# Patient Record
Sex: MALE | Race: WHITE | Employment: OTHER | URBAN - METROPOLITAN AREA
[De-identification: names, ages, dates, MRNs, and addresses within clinical notes are randomized per-mention and may not be internally consistent; named-entity substitution may affect disease eponyms.]

---

## 2017-02-10 ENCOUNTER — HOSPITAL ENCOUNTER (OUTPATIENT)
Dept: LAB | Age: 77
Discharge: HOME | End: 2017-02-10
Payer: MEDICARE

## 2017-02-10 ENCOUNTER — OFFICE VISIT (OUTPATIENT)
Dept: INTERNAL MEDICINE CLINIC | Age: 77
End: 2017-02-10

## 2017-02-10 VITALS
TEMPERATURE: 97 F | WEIGHT: 234 LBS | BODY MASS INDEX: 33.5 KG/M2 | HEART RATE: 54 BPM | SYSTOLIC BLOOD PRESSURE: 130 MMHG | OXYGEN SATURATION: 97 % | HEIGHT: 70 IN | RESPIRATION RATE: 16 BRPM | DIASTOLIC BLOOD PRESSURE: 80 MMHG

## 2017-02-10 DIAGNOSIS — E78.2 MIXED HYPERLIPIDEMIA: ICD-10-CM

## 2017-02-10 DIAGNOSIS — I10 ESSENTIAL HYPERTENSION: ICD-10-CM

## 2017-02-10 DIAGNOSIS — I10 ESSENTIAL HYPERTENSION: Primary | ICD-10-CM

## 2017-02-10 DIAGNOSIS — I48.0 PAROXYSMAL ATRIAL FIBRILLATION (HCC): ICD-10-CM

## 2017-02-10 LAB
ALBUMIN SERPL BCP-MCNC: 3.4 G/DL (ref 3.4–5)
ALBUMIN/GLOB SERPL: 1.1 {RATIO} (ref 0.8–1.7)
ALP SERPL-CCNC: 103 U/L (ref 45–117)
ALT SERPL-CCNC: 17 U/L (ref 16–61)
ANION GAP BLD CALC-SCNC: 7 MMOL/L (ref 3–18)
AST SERPL W P-5'-P-CCNC: 13 U/L (ref 15–37)
BASOPHILS # BLD AUTO: 0 K/UL (ref 0–0.06)
BASOPHILS # BLD: 0 % (ref 0–2)
BILIRUB SERPL-MCNC: 1 MG/DL (ref 0.2–1)
BUN SERPL-MCNC: 19 MG/DL (ref 7–18)
BUN/CREAT SERPL: 13 (ref 12–20)
CALCIUM SERPL-MCNC: 8.8 MG/DL (ref 8.5–10.1)
CHLORIDE SERPL-SCNC: 106 MMOL/L (ref 100–108)
CHOLEST SERPL-MCNC: 134 MG/DL
CO2 SERPL-SCNC: 29 MMOL/L (ref 21–32)
CREAT SERPL-MCNC: 1.41 MG/DL (ref 0.6–1.3)
DIFFERENTIAL METHOD BLD: ABNORMAL
EOSINOPHIL # BLD: 0.2 K/UL (ref 0–0.4)
EOSINOPHIL NFR BLD: 3 % (ref 0–5)
ERYTHROCYTE [DISTWIDTH] IN BLOOD BY AUTOMATED COUNT: 14.4 % (ref 11.6–14.5)
GLOBULIN SER CALC-MCNC: 3.2 G/DL (ref 2–4)
GLUCOSE SERPL-MCNC: 106 MG/DL (ref 74–99)
HCT VFR BLD AUTO: 43.3 % (ref 36–48)
HDLC SERPL-MCNC: 43 MG/DL (ref 40–60)
HDLC SERPL: 3.1 {RATIO} (ref 0–5)
HGB BLD-MCNC: 14.5 G/DL (ref 13–16)
LDLC SERPL CALC-MCNC: 71.2 MG/DL (ref 0–100)
LIPID PROFILE,FLP: NORMAL
LYMPHOCYTES # BLD AUTO: 16 % (ref 21–52)
LYMPHOCYTES # BLD: 1.4 K/UL (ref 0.9–3.6)
MCH RBC QN AUTO: 29.6 PG (ref 24–34)
MCHC RBC AUTO-ENTMCNC: 33.5 G/DL (ref 31–37)
MCV RBC AUTO: 88.4 FL (ref 74–97)
MONOCYTES # BLD: 0.6 K/UL (ref 0.05–1.2)
MONOCYTES NFR BLD AUTO: 7 % (ref 3–10)
NEUTS SEG # BLD: 6.5 K/UL (ref 1.8–8)
NEUTS SEG NFR BLD AUTO: 74 % (ref 40–73)
PLATELET # BLD AUTO: 172 K/UL (ref 135–420)
PMV BLD AUTO: 11.2 FL (ref 9.2–11.8)
POTASSIUM SERPL-SCNC: 3.7 MMOL/L (ref 3.5–5.5)
PROT SERPL-MCNC: 6.6 G/DL (ref 6.4–8.2)
RBC # BLD AUTO: 4.9 M/UL (ref 4.7–5.5)
SODIUM SERPL-SCNC: 142 MMOL/L (ref 136–145)
TRIGL SERPL-MCNC: 99 MG/DL (ref ?–150)
VLDLC SERPL CALC-MCNC: 19.8 MG/DL
WBC # BLD AUTO: 8.7 K/UL (ref 4.6–13.2)

## 2017-02-10 PROCEDURE — 85025 COMPLETE CBC W/AUTO DIFF WBC: CPT | Performed by: INTERNAL MEDICINE

## 2017-02-10 PROCEDURE — 80061 LIPID PANEL: CPT | Performed by: INTERNAL MEDICINE

## 2017-02-10 PROCEDURE — 80053 COMPREHEN METABOLIC PANEL: CPT | Performed by: INTERNAL MEDICINE

## 2017-02-10 RX ORDER — NIFEDIPINE 60 MG/1
60 TABLET, EXTENDED RELEASE ORAL DAILY
Qty: 90 TAB | Refills: 3 | Status: SHIPPED | OUTPATIENT
Start: 2017-02-10 | End: 2017-02-20

## 2017-02-10 NOTE — PATIENT INSTRUCTIONS

## 2017-02-10 NOTE — MR AVS SNAPSHOT
Visit Information Date & Time Provider Department Dept. Phone Encounter #  
 2/10/2017  9:00 AM Radha Strong MD [facility] INTERNAL MEDICINE OF [place] [phone] [id number] Follow-up Instructions Return if symptoms worsen or fail to improve. Follow-up and Disposition History Your Appointments 10/6/2017  9:00 AM  
Follow Up with Radha Strong MD  
[facility] INTERNAL MEDICINE OF [place] ([facility]) Appt Note: 8 months [address], [address]  
  
   
 [address], [address] [facility] [address] Upcoming Health Maintenance Date Due DTaP/Tdap/Td series (1 - Tdap) 4/3/1961 ZOSTER VACCINE AGE 60> 4/3/2000 GLAUCOMA SCREENING Q2Y 4/3/2005 Pneumococcal 65+ High/Highest Risk (2 of 2 - PPSV23) 11/13/2015 MEDICARE YEARLY EXAM 8/12/2017 COLONOSCOPY 6/6/2022 Allergies as of 2/10/2017  Review Complete On: 2/10/2017 By: Radha Strong MD  
  
 Severity Noted Reaction Type Reactions Vicodin [Hydrocodone-acetaminophen]  11/20/2014    Swelling Current Immunizations  Reviewed on 8/11/2016 Name Date Influenza High Dose Vaccine PF 10/1/2016 Influenza Vaccine [place]) 9/18/2015 Pneumococcal Conjugate (PCV-13) 9/18/2015 Pneumococcal Polysaccharide (PPSV-23) 1/2/2005 Not reviewed this visit You Were Diagnosed With   
  
 Codes Comments Essential hypertension    -  Primary ICD-10-CM: I10 
ICD-9-CM: 401.9 Mixed hyperlipidemia     ICD-10-CM: E78.2 ICD-9-CM: 272.2 Paroxysmal atrial fibrillation (HCC)     ICD-10-CM: I48.0 ICD-9-CM: 427.31 Vitals BP Pulse Temp Resp Height(growth percentile) Weight(growth percentile) 130/80 (BP 1 Location: Left arm, BP Patient Position: Sitting) (!) 54 97 °F (36.1 °C) (Tympanic) 16 5' 10\" (1.778 m) 234 lb (106.1 kg) SpO2 BMI Smoking Status 97% 33.58 kg/m2 Never Smoker Vitals History BMI and BSA Data Body Mass Index Body Surface Area 33.58 kg/m 2 2.29 m 2 Preferred Pharmacy Pharmacy Name Phone [address], [address] [place] [address] 22 [phone] [facility] [phone] Your Updated Medication List  
  
   
This list is accurate as of: 2/10/17  9:31 AM.  Always use your most recent med list.  
  
  
  
  
 allopurinol 100 mg tablet Commonly known as:  [place] Skip Take 1 Tab by mouth daily. atorvastatin 40 mg tablet Commonly known as:  LIPITOR  
1/2 qd  
  
 losartan-hydroCHLOROthiazide 100-12.5 mg per tablet Commonly known as:  HYZAAR Take 1 Tab by mouth daily. NIFEdipine ER 60 mg ER tablet Commonly known as:  PROCARDIA XL Take 1 Tab by mouth daily. rivaroxaban 20 mg Tab tablet Commonly known as:  [place] Honor Take 1 Tab by mouth daily. TENORMIN 50 mg tablet Generic drug:  atenolol Take  by mouth daily. Prescriptions Sent to Pharmacy Refills NIFEdipine ER (PROCARDIA XL) 60 mg ER tablet 3 Sig: Take 1 Tab by mouth daily. Class: Normal  
 Pharmacy: [address] [place] [address] #: [phone] Route: Oral  
  
Follow-up Instructions Return if symptoms worsen or fail to improve. To-Do List   
 02/10/2017 Lab:  CBC WITH AUTOMATED DIFF   
  
 02/10/2017 Lab:  LIPID PANEL   
  
 02/10/2017 Lab:  METABOLIC PANEL, COMPREHENSIVE Patient Instructions DASH Diet: Care Instructions Your Care Instructions The DASH diet is an eating plan that can help lower your blood pressure. DASH stands for Dietary Approaches to Stop Hypertension. Hypertension is high blood pressure. The DASH diet focuses on eating foods that are high in calcium, potassium, and magnesium. These nutrients can lower blood pressure.  The foods that are highest in these nutrients are fruits, vegetables, low-fat dairy products, nuts, seeds, and legumes. But taking calcium, potassium, and magnesium supplements instead of eating foods that are high in those nutrients does not have the same effect. The DASH diet also includes whole grains, fish, and poultry. The DASH diet is one of several lifestyle changes your doctor may recommend to lower your high blood pressure. Your doctor may also want you to decrease the amount of sodium in your diet. Lowering sodium while following the DASH diet can lower blood pressure even further than just the DASH diet alone. Follow-up care is a key part of your treatment and safety. Be sure to make and go to all appointments, and call your doctor if you are having problems. It's also a good idea to know your test results and keep a list of the medicines you take. How can you care for yourself at home? Following the DASH diet · Eat 4 to 5 servings of fruit each day. A serving is 1 medium-sized piece of fruit, ½ cup chopped or canned fruit, 1/4 cup dried fruit, or 4 ounces (½ cup) of fruit juice. Choose fruit more often than fruit juice. · Eat 4 to 5 servings of vegetables each day. A serving is 1 cup of lettuce or raw leafy vegetables, ½ cup of chopped or cooked vegetables, or 4 ounces (½ cup) of vegetable juice. Choose vegetables more often than vegetable juice. · Get 2 to 3 servings of low-fat and fat-free dairy each day. A serving is 8 ounces of milk, 1 cup of yogurt, or 1 ½ ounces of cheese. · Eat 6 to 8 servings of grains each day. A serving is 1 slice of bread, 1 ounce of dry cereal, or ½ cup of cooked rice, pasta, or cooked cereal. Try to choose whole-grain products as much as possible. · Limit lean meat, poultry, and fish to 2 servings each day. A serving is 3 ounces, about the size of a deck of cards. · Eat 4 to 5 servings of nuts, seeds, and legumes (cooked dried beans, lentils, and split peas) each week.  A serving is 1/3 cup of nuts, 2 tablespoons of seeds, or ½ cup of cooked beans or peas. · Limit fats and oils to 2 to 3 servings each day. A serving is 1 teaspoon of vegetable oil or 2 tablespoons of salad dressing. · Limit sweets and added sugars to 5 servings or less a week. A serving is 1 tablespoon jelly or jam, ½ cup sorbet, or 1 cup of lemonade. · Eat less than 2,300 milligrams (mg) of sodium a day. If you limit your sodium to 1,500 mg a day, you can lower your blood pressure even more. Tips for success · Start small. Do not try to make dramatic changes to your diet all at once. You might feel that you are missing out on your favorite foods and then be more likely to not follow the plan. Make small changes, and stick with them. Once those changes become habit, add a few more changes. · Try some of the following: ¨ Make it a goal to eat a fruit or vegetable at every meal and at snacks. This will make it easy to get the recommended amount of fruits and vegetables each day. ¨ Try yogurt topped with fruit and nuts for a snack or healthy dessert. ¨ Add lettuce, tomato, cucumber, and onion to sandwiches. ¨ Combine a ready-made pizza crust with low-fat mozzarella cheese and lots of vegetable toppings. Try using tomatoes, squash, spinach, broccoli, carrots, cauliflower, and onions. ¨ Have a variety of cut-up vegetables with a low-fat dip as an appetizer instead of chips and dip. ¨ Sprinkle sunflower seeds or chopped almonds over salads. Or try adding chopped walnuts or almonds to cooked vegetables. ¨ Try some vegetarian meals using beans and peas. Add garbanzo or kidney beans to salads. Make burritos and tacos with mashed yancey beans or black beans. Where can you learn more? Go to http://ana paula-carol.info/. Enter J225 in the search box to learn more about \"DASH Diet: Care Instructions. \" Current as of: March 23, 2016 Content Version: 11.1 © 1549-9392 [employer].  Care instructions adapted under license by [address] (which disclaims liability or warranty for this information). If you have questions about a medical condition or this instruction, always ask your healthcare professional. [address] any warranty or liability for your use of this information. Introducing [facility]! Brian Alonso introduces [employer] patient portal. Now you can access parts of your medical record, email your doctor's office, and request medication refills online. 1. In your internet browser, go to [email or web address] 2. Click on the First Time User? Click Here link in the Sign In box. You will see the New Member Sign Up page. 3. Enter your [employer] Access Code exactly as it appears below. You will not need to use this code after youve completed the sign-up process. If you do not sign up before the expiration date, you must request a new code. · [employer] Access Code: [email or web address] Expires: 5/11/2017  9:31 AM 
 
4. Enter the last four digits of your Social Security Number (xxxx) and Date of Birth (mm/dd/yyyy) as indicated and click Submit. You will be taken to the next sign-up page. 5. Create a [employer] ID. This will be your [employer] login ID and cannot be changed, so think of one that is secure and easy to remember. 6. Create a [employer] password. You can change your password at any time. 7. Enter your Password Reset Question and Answer. This can be used at a later time if you forget your password. 8. Enter your e-mail address. You will receive e-mail notification when new information is available in [address] 9. Click Sign Up. You can now view and download portions of your medical record. 10. Click the Download Summary menu link to download a portable copy of your medical information. If you have questions, please visit the Frequently Asked Questions section of the [employer] website.  Remember, [employer] is NOT to be used for urgent needs. For medical emergencies, dial 911. Now available from your iPhone and Android! Please provide this summary of care documentation to your next provider. Your primary care clinician is listed as Ashly Sigala. If you have any questions after today's visit, please call [phone].

## 2017-02-10 NOTE — PROGRESS NOTES
1. Have you been to the ER, urgent care clinic since your last visit? Hospitalized since your last visit? No    2. Have you seen or consulted any other health care providers outside of the [address] [place] [address] since your last visit? Include any pap smears or colon screening.  No

## 2017-02-10 NOTE — PROGRESS NOTES
HPI:   Routine f/u  Hx notable for HTN, hyperlipidemia, PAF  Chol 137 in August 2016  w/o chest pain/abd. discomfort; no dyspnea, cough or increased pedal edema; denies constitutional complaints of fever, night sweats or wt loss; no evidence of GI/ hemorrhage; no polyuria/polydipsia. Activity is age appropriate despite OA pain    ROS is otherwise negative. 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 Social History Main Topics    Smoking status: Never Smoker    Smokeless tobacco: Not on file    Alcohol use 0.0 oz/week     0 Standard drinks or equivalent per week    Drug use: No    Sexual activity: No     Other Topics Concern    Not on file     Social History Narrative       Allergies   Allergen Reactions    Vicodin [Hydrocodone-Acetaminophen] Swelling       Family History   Problem Relation Age of Onset    Heart Disease Father     Cancer Sister      breast       Current Outpatient Prescriptions   Medication Sig Dispense Refill    atorvastatin (LIPITOR) 40 mg tablet 1/2 qd 45 Tab 3    rivaroxaban (XARELTO) 20 mg tab tablet Take 1 Tab by mouth daily.  90 Tab 3    allopurinol (ZYLOPRIM) 100 mg tablet Take 1 Tab by mouth daily. 90 Tab 4    losartan-hydrochlorothiazide (HYZAAR) 100-12.5 mg per tablet Take 1 Tab by mouth daily. 90 Tab 3    atenolol (TENORMIN) 50 mg tablet Take  by mouth daily.  NIFEdipine XL (PROCARDIA XL) 60 mg CR tablet Take 60 mg by mouth daily. Visit Vitals    /80 (BP 1 Location: Left arm, BP Patient Position: Sitting)    Pulse (!) 54    Temp 97 °F (36.1 °C) (Tympanic)    Resp 16    Ht 5' 10\" (1.778 m)    Wt 234 lb (106.1 kg)    SpO2 97%    BMI 33.58 kg/m2       PE  Well nourished in NAD  HEENT:  OP: clear. Neck: supple w/o mass or bruits. Chest: clear. CV: RRR w/o m,r,g; pulses intact. Abd: soft, NT, w/o HSM or mass. Ext: tr edema. Neuro: NF. Assessment and Plan    Encounter Diagnoses   Name Primary?  Essential hypertension Yes    Mixed hyperlipidemia     Paroxysmal atrial fibrillation (HCC)    HTN - controlled  Hyperlipidemia - labs soon to assess metabolic parameters  PAF - continue xarelto  I have reviewed/discussed the above normal BMI with the patient. I have recommended the following interventions: dietary management education, guidance, and counseling . The plan is as follows: I have counseled this patient on diet and exercise regimens.  .    No change in rx  OV 6 mos or prn  I have explained plan to patient and the patient verbalizes understanding

## 2017-02-20 ENCOUNTER — TELEPHONE (OUTPATIENT)
Dept: INTERNAL MEDICINE CLINIC | Age: 77
End: 2017-02-20

## 2017-02-20 RX ORDER — AMLODIPINE BESYLATE 10 MG/1
10 TABLET ORAL DAILY
Qty: 90 TAB | Refills: 3 | Status: SHIPPED | OUTPATIENT
Start: 2017-02-20 | End: 2018-09-04 | Stop reason: SDUPTHER

## 2017-02-20 NOTE — TELEPHONE ENCOUNTER
Patient came in to let Dr Jonah Murray know his insurance would no longer be covering NIFEDICAL  XL TAB 60 MG. They are suggesting AMLODIPINE (TIER 1 )  OR FELODIPINE ER (TIER 3 ).

## 2017-10-06 ENCOUNTER — OFFICE VISIT (OUTPATIENT)
Dept: INTERNAL MEDICINE CLINIC | Age: 77
End: 2017-10-06

## 2017-10-06 VITALS
RESPIRATION RATE: 16 BRPM | SYSTOLIC BLOOD PRESSURE: 110 MMHG | TEMPERATURE: 97 F | OXYGEN SATURATION: 97 % | HEIGHT: 70 IN | WEIGHT: 236 LBS | DIASTOLIC BLOOD PRESSURE: 80 MMHG | HEART RATE: 61 BPM | BODY MASS INDEX: 33.79 KG/M2

## 2017-10-06 DIAGNOSIS — E78.2 MIXED HYPERLIPIDEMIA: ICD-10-CM

## 2017-10-06 DIAGNOSIS — I48.0 PAROXYSMAL ATRIAL FIBRILLATION (HCC): ICD-10-CM

## 2017-10-06 DIAGNOSIS — I10 ESSENTIAL HYPERTENSION: ICD-10-CM

## 2017-10-06 DIAGNOSIS — Z23 ENCOUNTER FOR IMMUNIZATION: ICD-10-CM

## 2017-10-06 DIAGNOSIS — Z00.00 ROUTINE GENERAL MEDICAL EXAMINATION AT A HEALTH CARE FACILITY: Primary | ICD-10-CM

## 2017-10-06 NOTE — PATIENT INSTRUCTIONS
DASH Diet: Care Instructions  Your Care Instructions  The DASH diet is an eating plan that can help lower your blood pressure. DASH stands for Dietary Approaches to Stop Hypertension. Hypertension is high blood pressure. The DASH diet focuses on eating foods that are high in calcium, potassium, and magnesium. These nutrients can lower blood pressure. The foods that are highest in these nutrients are fruits, vegetables, low-fat dairy products, nuts, seeds, and legumes. But taking calcium, potassium, and magnesium supplements instead of eating foods that are high in those nutrients does not have the same effect. The DASH diet also includes whole grains, fish, and poultry. The DASH diet is one of several lifestyle changes your doctor may recommend to lower your high blood pressure. Your doctor may also want you to decrease the amount of sodium in your diet. Lowering sodium while following the DASH diet can lower blood pressure even further than just the DASH diet alone. Follow-up care is a key part of your treatment and safety. Be sure to make and go to all appointments, and call your doctor if you are having problems. It's also a good idea to know your test results and keep a list of the medicines you take. How can you care for yourself at home? Following the DASH diet  · Eat 4 to 5 servings of fruit each day. A serving is 1 medium-sized piece of fruit, ½ cup chopped or canned fruit, 1/4 cup dried fruit, or 4 ounces (½ cup) of fruit juice. Choose fruit more often than fruit juice. · Eat 4 to 5 servings of vegetables each day. A serving is 1 cup of lettuce or raw leafy vegetables, ½ cup of chopped or cooked vegetables, or 4 ounces (½ cup) of vegetable juice. Choose vegetables more often than vegetable juice. · Get 2 to 3 servings of low-fat and fat-free dairy each day. A serving is 8 ounces of milk, 1 cup of yogurt, or 1 ½ ounces of cheese. · Eat 6 to 8 servings of grains each day.  A serving is 1 slice of bread, 1 ounce of dry cereal, or ½ cup of cooked rice, pasta, or cooked cereal. Try to choose whole-grain products as much as possible. · Limit lean meat, poultry, and fish to 2 servings each day. A serving is 3 ounces, about the size of a deck of cards. · Eat 4 to 5 servings of nuts, seeds, and legumes (cooked dried beans, lentils, and split peas) each week. A serving is 1/3 cup of nuts, 2 tablespoons of seeds, or ½ cup of cooked beans or peas. · Limit fats and oils to 2 to 3 servings each day. A serving is 1 teaspoon of vegetable oil or 2 tablespoons of salad dressing. · Limit sweets and added sugars to 5 servings or less a week. A serving is 1 tablespoon jelly or jam, ½ cup sorbet, or 1 cup of lemonade. · Eat less than 2,300 milligrams (mg) of sodium a day. If you limit your sodium to 1,500 mg a day, you can lower your blood pressure even more. Tips for success  · Start small. Do not try to make dramatic changes to your diet all at once. You might feel that you are missing out on your favorite foods and then be more likely to not follow the plan. Make small changes, and stick with them. Once those changes become habit, add a few more changes. · Try some of the following:  ¨ Make it a goal to eat a fruit or vegetable at every meal and at snacks. This will make it easy to get the recommended amount of fruits and vegetables each day. ¨ Try yogurt topped with fruit and nuts for a snack or healthy dessert. ¨ Add lettuce, tomato, cucumber, and onion to sandwiches. ¨ Combine a ready-made pizza crust with low-fat mozzarella cheese and lots of vegetable toppings. Try using tomatoes, squash, spinach, broccoli, carrots, cauliflower, and onions. ¨ Have a variety of cut-up vegetables with a low-fat dip as an appetizer instead of chips and dip. ¨ Sprinkle sunflower seeds or chopped almonds over salads. Or try adding chopped walnuts or almonds to cooked vegetables. ¨ Try some vegetarian meals using beans and peas. Add garbanzo or kidney beans to salads. Make burritos and tacos with mashed yancey beans or black beans. Where can you learn more? Go to http://ana paula-carol.info/. Enter O383 in the search box to learn more about \"DASH Diet: Care Instructions. \"  Current as of: April 3, 2017  Content Version: 11.3  © 2118-1023 [employer]. Care instructions adapted under license by [employer] (which disclaims liability or warranty for this information). If you have questions about a medical condition or this instruction, always ask your healthcare professional. [place] [address] any warranty or liability for your use of this information. Medicare Wellness Visit, Male    The best way to live healthy is to have a healthy lifestyle by eating a well-balanced diet, exercising regularly, limiting alcohol and stopping smoking. Regular physical exams and screening tests are another way to keep healthy. Preventive exams provided by your health care provider can find health problems before they become diseases or illnesses. Preventive services including immunizations, screening tests, monitoring and exams can help you take care of your own health. All people over age 72 should have a pneumovax  and and a prevnar shot to prevent pneumonia. These are once in a lifetime unless you and your provider decide differently. All people over 65 should have a yearly flu shot and a tetanus vaccine every 10 years. Screening for diabetes mellitus with a blood sugar test should be done every year. Glaucoma is a disease of the eye due to increased ocular pressure that can lead to blindness and it should be done every year by an eye professional.    Cardiovascular screening tests that check for elevated lipids (fatty part of blood) which can lead to heart disease and strokes should be done every 5 years.     Colorectal screening that evaluates for blood or polyps in your colon should be done yearly as a stool test or every five years as a flexible sigmoidoscope or every 10 years as a colonoscopy up to age 76. Men up to age 76 may need a screening blood test for prostate cancer at certain intervals, depending on their personal and family history. This decision is between the patient and his provider. If you have been a smoker or had family history of abdominal aortic aneurysms, you and your provider may decide to schedule an ultrasound test of your aorta. Hepatitis C screening is also recommended for anyone born between [birth year] through [address]. A shingles vaccine is also recommended once in a lifetime after age 61. Your Medicare Wellness Exam is recommended annually.     Here is a list of your current Health Maintenance items with a due date:  Health Maintenance Due   Topic Date Due    DTaP/Tdap/Td  (1 - Tdap) 04/03/1961    Shingles Vaccine  02/03/2000    Glaucoma Screening   04/03/2005    Pneumococcal Vaccine (2 of 2 - PPSV23) 11/13/2015    Flu Vaccine  08/01/2017    Annual Well Visit  08/12/2017

## 2017-10-06 NOTE — PROGRESS NOTES
This is a Subsequent Medicare Annual Wellness Exam (AWV) (Performed 12 months after IPPE or effective date of Medicare Part B enrollment, Once in a lifetime)    I have reviewed the patient's medical history in detail and updated the computerized patient record. 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 90 Tab 3    atorvastatin (LIPITOR) 40 mg tablet 1/2 qd 45 Tab 3    rivaroxaban (XARELTO) 20 mg tab tablet Take 1 Tab by mouth daily. 90 Tab 3    allopurinol (ZYLOPRIM) 100 mg tablet Take 1 Tab by mouth daily. 90 Tab 4    losartan-hydrochlorothiazide (HYZAAR) 100-12.5 mg per tablet Take 1 Tab by mouth daily. 90 Tab 3    atenolol (TENORMIN) 50 mg tablet Take  by mouth daily.        Allergies   Allergen Reactions    Vicodin [Hydrocodone-Acetaminophen] Swelling     Family History   Problem Relation Age of Onset    Heart Disease Father     Cancer Sister      breast     Social History   Substance Use Topics    Smoking status: Never Smoker    Smokeless tobacco: Not on file    Alcohol use 0.0 oz/week     0 Standard drinks or equivalent per week     Patient Active Problem List   Diagnosis Code    Pulmonary embolus ([facility] [address] I26.99    DVT (deep venous thrombosis) (HCC) I82.409    Atrial fibrillation (HCC) I48.91    Essential hypertension I10    Obesity E66.9    Prostate cancer ([facility] [address] C61    Mixed hyperlipidemia E78.2    Gout, unspecified M10.9    Advance directive discussed with patient Z71.89       Depression Risk Factor Screening:     PHQ over the last two weeks 2/10/2017   Little interest or pleasure in doing things Not at all   Feeling down, depressed or hopeless Not at all   Total Score PHQ 2 0     Alcohol Risk Factor Screening: You do not drink alcohol or very rarely. Functional Ability and Level of Safety:   Hearing Loss  Hearing is good. Activities of Daily Living  The home contains: no safety equipment. Patient does total self care    Fall Risk  Fall Risk Assessment, last 12 mths 9/18/2015   Able to walk? Yes   Fall in past 12 months? No       Abuse Screen  Patient is not abused    Cognitive Screening   Evaluation of Cognitive Function:  Has your family/caregiver stated any concerns about your memory: no  Normal    Patient Care Team   Patient Care Team:  Suzy Victoria MD as PCP - General (Internal Medicine)  Myrtle Hassan MD (Vascular Surgery)    Assessment/Plan   Education and counseling provided:  Are appropriate based on today's review and evaluation    Diagnoses and all orders for this visit:    1. Routine general medical examination at a health care facility    2. Paroxysmal atrial fibrillation (HCC)    3. Essential hypertension  -     METABOLIC PANEL, BASIC; Future    4. Mixed hyperlipidemia  -     LIPID PANEL; Future  -     AST; Future  -     ALT;  Future        Health Maintenance Due   Topic Date Due    DTaP/Tdap/Td series (1 - Tdap) 04/03/1961    ZOSTER VACCINE AGE 60>  02/03/2000    GLAUCOMA SCREENING Q2Y  04/03/2005    Pneumococcal 65+ High/Highest Risk (2 of 2 - PPSV23) 11/13/2015    INFLUENZA AGE 9 TO ADULT  08/01/2017    MEDICARE YEARLY EXAM  08/12/2017   Medicare wellness performed  Labs soon to assess metabolic parameters  Continue dietary/exercise efforts  [place] done April 2012 (polyp)  Vaccines: flu vaccine given  Advance directive discussed    PROGRESS NOTE  HPI:   Routine f/u of HTN, PAF, hyperlipidemia (chol 134 Feb 2017)  w/o chest pain/abd. discomfort; no dyspnea, cough or increased pedal edema; denies constitutional complaints of fever, night sweats or wt loss; no evidence of GI/ hemorrhage; no polyuria/polydipsia. Activity is sedentary    ROS is otherwise negative. 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      Social History Main Topics    Smoking status: Never Smoker    Smokeless tobacco: Not on file    Alcohol use 0.0 oz/week     0 Standard drinks or equivalent per week    Drug use: No    Sexual activity: No     Other Topics Concern    Not on file     Social History Narrative       Allergies   Allergen Reactions    Vicodin [Hydrocodone-Acetaminophen] Swelling       Family History   Problem Relation Age of Onset    Heart Disease Father     Cancer Sister      breast       Current Outpatient Prescriptions   Medication Sig Dispense Refill    amLODIPine (NORVASC) 10 mg tablet Take 1 Tab by mouth daily. 90 Tab 3    atorvastatin (LIPITOR) 40 mg tablet 1/2 qd 45 Tab 3    rivaroxaban (XARELTO) 20 mg tab tablet Take 1 Tab by mouth daily. 90 Tab 3    allopurinol (ZYLOPRIM) 100 mg tablet Take 1 Tab by mouth daily. 90 Tab 4    losartan-hydrochlorothiazide (HYZAAR) 100-12.5 mg per tablet Take 1 Tab by mouth daily. 90 Tab 3    atenolol (TENORMIN) 50 mg tablet Take  by mouth daily. Visit Vitals    /80 (BP 1 Location: Left arm, BP Patient Position: Sitting)    Pulse 61    Temp 97 °F (36.1 °C) (Tympanic)    Resp 16    Ht 5' 10\" (1.778 m)    Wt 236 lb (107 kg)    SpO2 97%    BMI 33.86 kg/m2       PE  Well nourished in NAD  HEENT:   OP: clear. Neck: supple w/o mass or bruits. Chest: clear. CV: RRR w/o m,r,g; pulses NP distal legs  Abd: soft, NT, w/o HSM or mass. Ext: tr - 1+ edema. Neuro: NF. Assessment and Plan    Encounter Diagnoses   Name Primary?  Routine general medical examination at a health care facility Yes    Paroxysmal atrial fibrillation ([address]     Essential hypertension     Mixed hyperlipidemia    PAF - anticoagulated with xarelto  HTN - controlled  Hyperlipidemia - labs soon to assess  I have reviewed/discussed the above normal BMI with the patient. I have recommended the following interventions: dietary management education, guidance, and counseling . Mabeline Sink     No change in rx  OV 6 mos or prn  I have explained plan to patient and the patient verbalizes understanding

## 2017-10-06 NOTE — PROGRESS NOTES
1. Have you been to the ER, urgent care clinic since your last visit? Hospitalized since your last visit? No    2. Have you seen or consulted any other health care providers outside of the [address] [place] [address] since your last visit? Include any pap smears or colon screening.  Yes When: sept25 Where: Dr Krystle Pretty Reason for visit: vascular procedure

## 2017-10-06 NOTE — MR AVS SNAPSHOT
Visit Information Date & Time Provider Department Dept. Phone Encounter #  
 10/6/2017  9:00 AM Radha Strong MD [facility] OF [place] [phone] [id number] Follow-up Instructions Return if symptoms worsen or fail to improve. Your Appointments 4/13/2018  8:30 AM  
Follow Up with Radha Strong MD  
[facility] INTERNAL MEDICINE OF [place] ([facility]) Appt Note: 6 month [address], [address] [place] [address]  
  
   
 [address], [address] [place] [address] Upcoming Health Maintenance Date Due DTaP/Tdap/Td series (1 - Tdap) 4/3/1961 ZOSTER VACCINE AGE 60> 2/3/2000 GLAUCOMA SCREENING Q2Y 4/3/2005 Pneumococcal 65+ High/Highest Risk (2 of 2 - PPSV23) 11/13/2015 INFLUENZA AGE 9 TO ADULT 8/1/2017 MEDICARE YEARLY EXAM 8/12/2017 COLONOSCOPY 6/6/2022 Allergies as of 10/6/2017  Review Complete On: 10/6/2017 By: Radha Strong MD  
  
 Severity Noted Reaction Type Reactions Vicodin [Hydrocodone-acetaminophen]  11/20/2014    Swelling Current Immunizations  Reviewed on 8/11/2016 Name Date Influenza High Dose Vaccine PF  Incomplete, 10/1/2016 Influenza Vaccine [place]ita [place]) 9/18/2015 Pneumococcal Conjugate (PCV-13) 9/18/2015 Pneumococcal Polysaccharide (PPSV-23) 1/2/2005 Not reviewed this visit You Were Diagnosed With   
  
 Codes Comments Routine general medical examination at a health care facility    -  Primary ICD-10-CM: Z00.00 ICD-9-CM: V70.0 Paroxysmal atrial fibrillation (HCC)     ICD-10-CM: I48.0 ICD-9-CM: 427.31 Essential hypertension     ICD-10-CM: I10 
ICD-9-CM: 401.9 Mixed hyperlipidemia     ICD-10-CM: E78.2 ICD-9-CM: 272.2 Encounter for immunization     ICD-10-CM: R45 ICD-9-CM: V03.89 Vitals BP Pulse Temp Resp Height(growth percentile) Weight(growth percentile) 110/80 (BP 1 Location: Left arm, BP Patient Position: Sitting) 61 97 °F (36.1 °C) (Tympanic) 16 5' 10\" (1.778 m) 236 lb (107 kg) SpO2 BMI Smoking Status 97% 33.86 kg/m2 Never Smoker BMI and BSA Data Body Mass Index Body Surface Area  
 33.86 kg/m 2 2.3 m 2 Preferred Pharmacy Pharmacy Name Phone [address], [address] [facility] [address]00 [facility] [phone] Your Updated Medication List  
  
   
This list is accurate as of: 10/6/17  9:07 AM.  Always use your most recent med list.  
  
  
  
  
 allopurinol 100 mg tablet Commonly known as:  Vipul Needle Take 1 Tab by mouth daily. amLODIPine 10 mg tablet Commonly known as:  Izetta Harder Take 1 Tab by mouth daily. atorvastatin 40 mg tablet Commonly known as:  LIPITOR  
1/2 qd  
  
 losartan-hydroCHLOROthiazide 100-12.5 mg per tablet Commonly known as:  HYZAAR Take 1 Tab by mouth daily. rivaroxaban 20 mg Tab tablet Commonly known as:  Tia Krystyna Take 1 Tab by mouth daily. TENORMIN 50 mg tablet Generic drug:  atenolol Take  by mouth daily. We Performed the Following INFLUENZA VIRUS VACCINE, HIGH DOSE SEASONAL, PRESERVATIVE FREE [70917 CPT(R)] Follow-up Instructions Return if symptoms worsen or fail to improve. To-Do List   
 10/06/2017 Lab:  ALT   
  
 10/06/2017 Lab:  AST   
  
 10/06/2017 Lab:  LIPID PANEL   
  
 10/06/2017 Lab:  METABOLIC PANEL, BASIC Patient Instructions DASH Diet: Care Instructions Your Care Instructions The DASH diet is an eating plan that can help lower your blood pressure. DASH stands for Dietary Approaches to Stop Hypertension. Hypertension is high blood pressure. The DASH diet focuses on eating foods that are high in calcium, potassium, and magnesium. These nutrients can lower blood pressure.  The foods that are highest in these nutrients are fruits, vegetables, low-fat dairy products, nuts, seeds, and legumes. But taking calcium, potassium, and magnesium supplements instead of eating foods that are high in those nutrients does not have the same effect. The DASH diet also includes whole grains, fish, and poultry. The DASH diet is one of several lifestyle changes your doctor may recommend to lower your high blood pressure. Your doctor may also want you to decrease the amount of sodium in your diet. Lowering sodium while following the DASH diet can lower blood pressure even further than just the DASH diet alone. Follow-up care is a key part of your treatment and safety. Be sure to make and go to all appointments, and call your doctor if you are having problems. It's also a good idea to know your test results and keep a list of the medicines you take. How can you care for yourself at home? Following the DASH diet · Eat 4 to 5 servings of fruit each day. A serving is 1 medium-sized piece of fruit, ½ cup chopped or canned fruit, 1/4 cup dried fruit, or 4 ounces (½ cup) of fruit juice. Choose fruit more often than fruit juice. · Eat 4 to 5 servings of vegetables each day. A serving is 1 cup of lettuce or raw leafy vegetables, ½ cup of chopped or cooked vegetables, or 4 ounces (½ cup) of vegetable juice. Choose vegetables more often than vegetable juice. · Get 2 to 3 servings of low-fat and fat-free dairy each day. A serving is 8 ounces of milk, 1 cup of yogurt, or 1 ½ ounces of cheese. · Eat 6 to 8 servings of grains each day. A serving is 1 slice of bread, 1 ounce of dry cereal, or ½ cup of cooked rice, pasta, or cooked cereal. Try to choose whole-grain products as much as possible. · Limit lean meat, poultry, and fish to 2 servings each day. A serving is 3 ounces, about the size of a deck of cards.  
· Eat 4 to 5 servings of nuts, seeds, and legumes (cooked dried beans, lentils, and split peas) each week. A serving is 1/3 cup of nuts, 2 tablespoons of seeds, or ½ cup of cooked beans or peas. · Limit fats and oils to 2 to 3 servings each day. A serving is 1 teaspoon of vegetable oil or 2 tablespoons of salad dressing. · Limit sweets and added sugars to 5 servings or less a week. A serving is 1 tablespoon jelly or jam, ½ cup sorbet, or 1 cup of lemonade. · Eat less than 2,300 milligrams (mg) of sodium a day. If you limit your sodium to 1,500 mg a day, you can lower your blood pressure even more. Tips for success · Start small. Do not try to make dramatic changes to your diet all at once. You might feel that you are missing out on your favorite foods and then be more likely to not follow the plan. Make small changes, and stick with them. Once those changes become habit, add a few more changes. · Try some of the following: ¨ Make it a goal to eat a fruit or vegetable at every meal and at snacks. This will make it easy to get the recommended amount of fruits and vegetables each day. ¨ Try yogurt topped with fruit and nuts for a snack or healthy dessert. ¨ Add lettuce, tomato, cucumber, and onion to sandwiches. ¨ Combine a ready-made pizza crust with low-fat mozzarella cheese and lots of vegetable toppings. Try using tomatoes, squash, spinach, broccoli, carrots, cauliflower, and onions. ¨ Have a variety of cut-up vegetables with a low-fat dip as an appetizer instead of chips and dip. ¨ Sprinkle sunflower seeds or chopped almonds over salads. Or try adding chopped walnuts or almonds to cooked vegetables. ¨ Try some vegetarian meals using beans and peas. Add garbanzo or kidney beans to salads. Make burritos and tacos with mashed yancey beans or black beans. Where can you learn more? Go to http://ana paula-carol.info/. Enter V186 in the search box to learn more about \"DASH Diet: Care Instructions. \" Current as of: April 3, 2017 Content Version: 11.3 © 5900-8587 [employer]. Care instructions adapted under license by [employer] (which disclaims liability or warranty for this information). If you have questions about a medical condition or this instruction, always ask your healthcare professional. [address] any warranty or liability for your use of this information. Medicare Wellness Visit, Male The best way to live healthy is to have a healthy lifestyle by eating a well-balanced diet, exercising regularly, limiting alcohol and stopping smoking. Regular physical exams and screening tests are another way to keep healthy. Preventive exams provided by your health care provider can find health problems before they become diseases or illnesses. Preventive services including immunizations, screening tests, monitoring and exams can help you take care of your own health. All people over age 72 should have a pneumovax  and and a prevnar shot to prevent pneumonia. These are once in a lifetime unless you and your provider decide differently. All people over 65 should have a yearly flu shot and a tetanus vaccine every 10 years. Screening for diabetes mellitus with a blood sugar test should be done every year. Glaucoma is a disease of the eye due to increased ocular pressure that can lead to blindness and it should be done every year by an eye professional. 
 
Cardiovascular screening tests that check for elevated lipids (fatty part of blood) which can lead to heart disease and strokes should be done every 5 years. Colorectal screening that evaluates for blood or polyps in your colon should be done yearly as a stool test or every five years as a flexible sigmoidoscope or every 10 years as a colonoscopy up to age 76. Men up to age 76 may need a screening blood test for prostate cancer at certain intervals, depending on their personal and family history.  This decision is between the patient and his provider. If you have been a smoker or had family history of abdominal aortic aneurysms, you and your provider may decide to schedule an ultrasound test of your aorta. Hepatitis C screening is also recommended for anyone born between [birth year] through [address]. A shingles vaccine is also recommended once in a lifetime after age 61. Your Medicare Wellness Exam is recommended annually. Here is a list of your current Health Maintenance items with a due date: 
Health Maintenance Due Topic Date Due  
 DTaP/Tdap/Td  (1 - Tdap) 04/03/1961  Shingles Vaccine  02/03/2000  Glaucoma Screening   04/03/2005  Pneumococcal Vaccine (2 of 2 - PPSV23) 11/13/2015  Flu Vaccine  08/01/2017 Thalia Vega Annual Well Visit  08/12/2017 Introducing \[facility] [place] [facility]\"" & HEALTH SERVICES! [facility] introduces [employer] patient portal. Now you can access parts of your medical record, email your doctor's office, and request medication refills online. 1. In your internet browser, go to [email or web address] Online 2. Click on the First Time User? Click Here link in the Sign In box. You will see the New Member Sign Up page. 3. Enter your [employer] Access Code exactly as it appears below. You will not need to use this code after youve completed the sign-up process. If you do not sign up before the expiration date, you must request a new code. · [employer] Access Code: [email or web address] Expires: 1/4/2018  8:56 AM 
 
4. Enter the last four digits of your Social Security Number (xxxx) and Date of Birth (mm/dd/yyyy) as indicated and click Submit. You will be taken to the next sign-up page. 5. Create a [employer] ID. This will be your [employer] login ID and cannot be changed, so think of one that is secure and easy to remember. 6. Create a [employer] password. You can change your password at any time. 7. Enter your Password Reset Question and Answer.  This can be used at a later time if you forget your password. 8. Enter your e-mail address. You will receive e-mail notification when new information is available in [address] 9. Click Sign Up. You can now view and download portions of your medical record. 10. Click the Download Summary menu link to download a portable copy of your medical information. If you have questions, please visit the Frequently Asked Questions section of the [employer] website. Remember, [employer] is NOT to be used for urgent needs. For medical emergencies, dial 911. Now available from your iPhone and Android! Please provide this summary of care documentation to your next provider. Your primary care clinician is listed as Osmin Augustine. If you have any questions after today's visit, please call [phone].

## 2017-10-18 ENCOUNTER — HOSPITAL ENCOUNTER (OUTPATIENT)
Dept: LAB | Age: 77
Discharge: HOME | End: 2017-10-18
Payer: MEDICARE

## 2017-10-18 ENCOUNTER — LAB ONLY (OUTPATIENT)
Dept: INTERNAL MEDICINE CLINIC | Age: 77
End: 2017-10-18

## 2017-10-18 DIAGNOSIS — I10 ESSENTIAL HYPERTENSION: ICD-10-CM

## 2017-10-18 DIAGNOSIS — E78.2 MIXED HYPERLIPIDEMIA: ICD-10-CM

## 2017-10-18 DIAGNOSIS — I48.0 PAROXYSMAL ATRIAL FIBRILLATION (HCC): Primary | ICD-10-CM

## 2017-10-18 LAB
ALT SERPL-CCNC: 13 U/L (ref 16–61)
ANION GAP SERPL CALC-SCNC: 7 MMOL/L (ref 3–18)
AST SERPL-CCNC: 12 U/L (ref 15–37)
BUN SERPL-MCNC: 22 MG/DL (ref 7–18)
BUN/CREAT SERPL: 14 (ref 12–20)
CALCIUM SERPL-MCNC: 8.7 MG/DL (ref 8.5–10.1)
CHLORIDE SERPL-SCNC: 105 MMOL/L (ref 100–108)
CHOLEST SERPL-MCNC: 132 MG/DL
CO2 SERPL-SCNC: 29 MMOL/L (ref 21–32)
CREAT SERPL-MCNC: 1.52 MG/DL (ref 0.6–1.3)
GLUCOSE SERPL-MCNC: 105 MG/DL (ref 74–99)
HDLC SERPL-MCNC: 42 MG/DL (ref 40–60)
HDLC SERPL: 3.1 {RATIO} (ref 0–5)
LDLC SERPL CALC-MCNC: 70.4 MG/DL (ref 0–100)
LIPID PROFILE,FLP: NORMAL
POTASSIUM SERPL-SCNC: 3.7 MMOL/L (ref 3.5–5.5)
SODIUM SERPL-SCNC: 141 MMOL/L (ref 136–145)
TRIGL SERPL-MCNC: 98 MG/DL (ref ?–150)
VLDLC SERPL CALC-MCNC: 19.6 MG/DL

## 2017-10-18 PROCEDURE — 84460 ALANINE AMINO (ALT) (SGPT): CPT | Performed by: INTERNAL MEDICINE

## 2017-10-18 PROCEDURE — 84450 TRANSFERASE (AST) (SGOT): CPT | Performed by: INTERNAL MEDICINE

## 2017-10-18 PROCEDURE — 80048 BASIC METABOLIC PNL TOTAL CA: CPT | Performed by: INTERNAL MEDICINE

## 2017-10-18 PROCEDURE — 80061 LIPID PANEL: CPT | Performed by: INTERNAL MEDICINE

## 2018-04-13 ENCOUNTER — OFFICE VISIT (OUTPATIENT)
Dept: INTERNAL MEDICINE CLINIC | Age: 78
End: 2018-04-13

## 2018-04-13 VITALS
SYSTOLIC BLOOD PRESSURE: 110 MMHG | BODY MASS INDEX: 34.36 KG/M2 | WEIGHT: 240 LBS | DIASTOLIC BLOOD PRESSURE: 68 MMHG | HEIGHT: 70 IN | HEART RATE: 64 BPM | TEMPERATURE: 96.6 F | RESPIRATION RATE: 16 BRPM | OXYGEN SATURATION: 96 %

## 2018-04-13 DIAGNOSIS — E78.2 MIXED HYPERLIPIDEMIA: ICD-10-CM

## 2018-04-13 DIAGNOSIS — I10 ESSENTIAL HYPERTENSION: ICD-10-CM

## 2018-04-13 DIAGNOSIS — I48.0 PAROXYSMAL ATRIAL FIBRILLATION (HCC): Primary | ICD-10-CM

## 2018-04-13 NOTE — PROGRESS NOTES
1. Have you been to the ER, urgent care clinic since your last visit? Hospitalized since your last visit? No    2. Have you seen or consulted any other health care providers outside of the [address] [place] [address] since your last visit? Include any pap smears or colon screening.  No

## 2018-04-13 NOTE — PATIENT INSTRUCTIONS
DASH Diet: Care Instructions  Your Care Instructions    The DASH diet is an eating plan that can help lower your blood pressure. DASH stands for Dietary Approaches to Stop Hypertension. Hypertension is high blood pressure. The DASH diet focuses on eating foods that are high in calcium, potassium, and magnesium. These nutrients can lower blood pressure. The foods that are highest in these nutrients are fruits, vegetables, low-fat dairy products, nuts, seeds, and legumes. But taking calcium, potassium, and magnesium supplements instead of eating foods that are high in those nutrients does not have the same effect. The DASH diet also includes whole grains, fish, and poultry. The DASH diet is one of several lifestyle changes your doctor may recommend to lower your high blood pressure. Your doctor may also want you to decrease the amount of sodium in your diet. Lowering sodium while following the DASH diet can lower blood pressure even further than just the DASH diet alone. Follow-up care is a key part of your treatment and safety. Be sure to make and go to all appointments, and call your doctor if you are having problems. It's also a good idea to know your test results and keep a list of the medicines you take. How can you care for yourself at home? Following the DASH diet  · Eat 4 to 5 servings of fruit each day. A serving is 1 medium-sized piece of fruit, ½ cup chopped or canned fruit, 1/4 cup dried fruit, or 4 ounces (½ cup) of fruit juice. Choose fruit more often than fruit juice. · Eat 4 to 5 servings of vegetables each day. A serving is 1 cup of lettuce or raw leafy vegetables, ½ cup of chopped or cooked vegetables, or 4 ounces (½ cup) of vegetable juice. Choose vegetables more often than vegetable juice. · Get 2 to 3 servings of low-fat and fat-free dairy each day. A serving is 8 ounces of milk, 1 cup of yogurt, or 1 ½ ounces of cheese. · Eat 6 to 8 servings of grains each day.  A serving is 1 slice of bread, 1 ounce of dry cereal, or ½ cup of cooked rice, pasta, or cooked cereal. Try to choose whole-grain products as much as possible. · Limit lean meat, poultry, and fish to 2 servings each day. A serving is 3 ounces, about the size of a deck of cards. · Eat 4 to 5 servings of nuts, seeds, and legumes (cooked dried beans, lentils, and split peas) each week. A serving is 1/3 cup of nuts, 2 tablespoons of seeds, or ½ cup of cooked beans or peas. · Limit fats and oils to 2 to 3 servings each day. A serving is 1 teaspoon of vegetable oil or 2 tablespoons of salad dressing. · Limit sweets and added sugars to 5 servings or less a week. A serving is 1 tablespoon jelly or jam, ½ cup sorbet, or 1 cup of lemonade. · Eat less than 2,300 milligrams (mg) of sodium a day. If you limit your sodium to 1,500 mg a day, you can lower your blood pressure even more. Tips for success  · Start small. Do not try to make dramatic changes to your diet all at once. You might feel that you are missing out on your favorite foods and then be more likely to not follow the plan. Make small changes, and stick with them. Once those changes become habit, add a few more changes. · Try some of the following:  ¨ Make it a goal to eat a fruit or vegetable at every meal and at snacks. This will make it easy to get the recommended amount of fruits and vegetables each day. ¨ Try yogurt topped with fruit and nuts for a snack or healthy dessert. ¨ Add lettuce, tomato, cucumber, and onion to sandwiches. ¨ Combine a ready-made pizza crust with low-fat mozzarella cheese and lots of vegetable toppings. Try using tomatoes, squash, spinach, broccoli, carrots, cauliflower, and onions. ¨ Have a variety of cut-up vegetables with a low-fat dip as an appetizer instead of chips and dip. ¨ Sprinkle sunflower seeds or chopped almonds over salads. Or try adding chopped walnuts or almonds to cooked vegetables.   ¨ Try some vegetarian meals using beans and peas. Add garbanzo or kidney beans to salads. Make burritos and tacos with mashed yancey beans or black beans. Where can you learn more? Go to http://ana paula-carol.info/. Enter X711 in the search box to learn more about \"DASH Diet: Care Instructions. \"  Current as of: September 21, 2016  Content Version: 11.4  © 4895-2008 [employer]. Care instructions adapted under license by [employer] (which disclaims liability or warranty for this information). If you have questions about a medical condition or this instruction, always ask your healthcare professional. [address] any warranty or liability for your use of this information. Body Mass Index: Care Instructions  Your Care Instructions    Body mass index (BMI) can help you see if your weight is raising your risk for health problems. It uses a formula to compare how much you weigh with how tall you are. · A BMI lower than 18.5 is considered underweight. · A BMI between 18.5 and 24.9 is considered healthy. · A BMI between 25 and 29.9 is considered overweight. A BMI of 30 or higher is considered obese. If your BMI is in the normal range, it means that you have a lower risk for weight-related health problems. If your BMI is in the overweight or obese range, you may be at increased risk for weight-related health problems, such as high blood pressure, heart disease, stroke, arthritis or joint pain, and diabetes. If your BMI is in the underweight range, you may be at increased risk for health problems such as fatigue, lower protection (immunity) against illness, muscle loss, bone loss, hair loss, and hormone problems. BMI is just one measure of your risk for weight-related health problems. You may be at higher risk for health problems if you are not active, you eat an unhealthy diet, or you drink too much alcohol or use tobacco products.   Follow-up care is a key part of your treatment and safety. Be sure to make and go to all appointments, and call your doctor if you are having problems. It's also a good idea to know your test results and keep a list of the medicines you take. How can you care for yourself at home? · Practice healthy eating habits. This includes eating plenty of fruits, vegetables, whole grains, lean protein, and low-fat dairy. · If your doctor recommends it, get more exercise. Walking is a good choice. Bit by bit, increase the amount you walk every day. Try for at least 30 minutes on most days of the week. · Do not smoke. Smoking can increase your risk for health problems. If you need help quitting, talk to your doctor about stop-smoking programs and medicines. These can increase your chances of quitting for good. · Limit alcohol to 2 drinks a day for men and 1 drink a day for women. Too much alcohol can cause health problems. If you have a BMI higher than 25  · Your doctor may do other tests to check your risk for weight-related health problems. This may include measuring the distance around your waist. A waist measurement of more than 40 inches in men or 35 inches in women can increase the risk of weight-related health problems. · Talk with your doctor about steps you can take to stay healthy or improve your health. You may need to make lifestyle changes to lose weight and stay healthy, such as changing your diet and getting regular exercise. If you have a BMI lower than 18.5  · Your doctor may do other tests to check your risk for health problems. · Talk with your doctor about steps you can take to stay healthy or improve your health. You may need to make lifestyle changes to gain or maintain weight and stay healthy, such as getting more healthy foods in your diet and doing exercises to build muscle. Where can you learn more? Go to http://ana paula-carol.info/.   Enter S176 in the search box to learn more about \"Body Mass Index: Care Instructions. \"  Current as of: October 13, 2016  Content Version: 11.4  © 8103-9667 Healthwise, [facility]. Care instructions adapted under license by [employer] (which disclaims liability or warranty for this information). If you have questions about a medical condition or this instruction, always ask your healthcare professional. [place] [address] any warranty or liability for your use of this information.

## 2018-04-13 NOTE — ACP (ADVANCE CARE PLANNING)

## 2018-04-13 NOTE — PROGRESS NOTES
HPI:   F/u visit for routine evaluation of HTN, PAF, hyperlipidemia  Chol 132 Oct 2017  w/o chest pain/abd. discomfort; no dyspnea, cough or increased pedal edema; denies constitutional complaints of fever, night sweats or wt loss; no evidence of GI/ hemorrhage. ROS is otherwise negative. 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 Social History Main Topics    Smoking status: Never Smoker    Smokeless tobacco: Never Used    Alcohol use 0.0 oz/week     0 Standard drinks or equivalent per week    Drug use: No    Sexual activity: No     Other Topics Concern    Not on file     Social History Narrative       Allergies   Allergen Reactions    Vicodin [Hydrocodone-Acetaminophen] Swelling       Family History   Problem Relation Age of Onset    Heart Disease Father     Cancer Sister      breast       Current Outpatient Prescriptions   Medication Sig Dispense Refill    amLODIPine (NORVASC) 10 mg tablet Take 1 Tab by mouth daily. 90 Tab 3    atorvastatin (LIPITOR) 40 mg tablet 1/2 qd 45 Tab 3    rivaroxaban (XARELTO) 20 mg tab tablet Take 1 Tab by mouth daily.  90 Tab 3    allopurinol (ZYLOPRIM) 100 mg tablet Take 1 Tab by mouth daily. 90 Tab 4    losartan-hydrochlorothiazide (HYZAAR) 100-12.5 mg per tablet Take 1 Tab by mouth daily. 90 Tab 3    atenolol (TENORMIN) 50 mg tablet Take  by mouth daily. Visit Vitals    /68 (BP 1 Location: Left arm, BP Patient Position: Sitting)    Pulse 64    Temp 96.6 °F (35.9 °C) (Tympanic)    Resp 16    Ht 5' 10\" (1.778 m)    Wt 240 lb (108.9 kg)    SpO2 96%    BMI 34.44 kg/m2       PE  Well nourished in NAD  HEENT:  OP: clear. Neck: supple w/o mass or bruits. Chest: clear. CV: RRR w/o m,r,g; pulses NP distal legs  Abd: soft, NT, w/o HSM or mass. Ext: tr - 1+ edema. Neuro: NF. Assessment and Plan    Encounter Diagnoses   Name Primary?  Paroxysmal atrial fibrillation (HCC) Yes    Essential hypertension     Mixed hyperlipidemia    PAF - anticoagulated with xarelto  HTN - controlled  Hyperlipidemia - labs soon to assess  Pt considering a repeat colo  Continue dietary/exercise efforts  No change in rx  OV 6 mos or prn  I have explained plan to patient and the patient verbalizes understanding            Discussed the patient's BMI with him. The BMI follow up plan is as follows:     dietary management education, guidance, and counseling  encourage exercise  monitor weight  prescribed dietary intake    An After Visit Summary was printed and given to the patient.

## 2018-04-24 ENCOUNTER — HOSPITAL ENCOUNTER (OUTPATIENT)
Dept: LAB | Age: 78
Discharge: HOME | End: 2018-04-24
Payer: MEDICARE

## 2018-04-24 DIAGNOSIS — I10 ESSENTIAL HYPERTENSION: ICD-10-CM

## 2018-04-24 DIAGNOSIS — E78.2 MIXED HYPERLIPIDEMIA: ICD-10-CM

## 2018-04-24 LAB
ALBUMIN SERPL-MCNC: 3.5 G/DL (ref 3.4–5)
ALBUMIN/GLOB SERPL: 1 {RATIO} (ref 0.8–1.7)
ALP SERPL-CCNC: 89 U/L (ref 45–117)
ALT SERPL-CCNC: 23 U/L (ref 16–61)
ANION GAP SERPL CALC-SCNC: 9 MMOL/L (ref 3–18)
AST SERPL-CCNC: 22 U/L (ref 15–37)
BASOPHILS # BLD: 0 K/UL (ref 0–0.06)
BASOPHILS NFR BLD: 1 % (ref 0–2)
BILIRUB SERPL-MCNC: 0.8 MG/DL (ref 0.2–1)
BUN SERPL-MCNC: 17 MG/DL (ref 7–18)
BUN/CREAT SERPL: 13 (ref 12–20)
CALCIUM SERPL-MCNC: 8.4 MG/DL (ref 8.5–10.1)
CHLORIDE SERPL-SCNC: 107 MMOL/L (ref 100–108)
CHOLEST SERPL-MCNC: 136 MG/DL
CO2 SERPL-SCNC: 26 MMOL/L (ref 21–32)
CREAT SERPL-MCNC: 1.32 MG/DL (ref 0.6–1.3)
DIFFERENTIAL METHOD BLD: ABNORMAL
EOSINOPHIL # BLD: 0.2 K/UL (ref 0–0.4)
EOSINOPHIL NFR BLD: 3 % (ref 0–5)
ERYTHROCYTE [DISTWIDTH] IN BLOOD BY AUTOMATED COUNT: 15.9 % (ref 11.6–14.5)
GLOBULIN SER CALC-MCNC: 3.4 G/DL (ref 2–4)
GLUCOSE SERPL-MCNC: 101 MG/DL (ref 74–99)
HCT VFR BLD AUTO: 43.2 % (ref 36–48)
HDLC SERPL-MCNC: 41 MG/DL (ref 40–60)
HDLC SERPL: 3.3 {RATIO} (ref 0–5)
HGB BLD-MCNC: 14.4 G/DL (ref 13–16)
LDLC SERPL CALC-MCNC: 65.4 MG/DL (ref 0–100)
LIPID PROFILE,FLP: NORMAL
LYMPHOCYTES # BLD: 1.4 K/UL (ref 0.9–3.6)
LYMPHOCYTES NFR BLD: 18 % (ref 21–52)
MCH RBC QN AUTO: 29.3 PG (ref 24–34)
MCHC RBC AUTO-ENTMCNC: 33.3 G/DL (ref 31–37)
MCV RBC AUTO: 88 FL (ref 74–97)
MONOCYTES # BLD: 0.5 K/UL (ref 0.05–1.2)
MONOCYTES NFR BLD: 6 % (ref 3–10)
NEUTS SEG # BLD: 5.5 K/UL (ref 1.8–8)
NEUTS SEG NFR BLD: 72 % (ref 40–73)
PLATELET # BLD AUTO: 176 K/UL (ref 135–420)
PMV BLD AUTO: 11.2 FL (ref 9.2–11.8)
POTASSIUM SERPL-SCNC: 3.5 MMOL/L (ref 3.5–5.5)
PROT SERPL-MCNC: 6.9 G/DL (ref 6.4–8.2)
RBC # BLD AUTO: 4.91 M/UL (ref 4.7–5.5)
SODIUM SERPL-SCNC: 142 MMOL/L (ref 136–145)
TRIGL SERPL-MCNC: 148 MG/DL (ref ?–150)
VLDLC SERPL CALC-MCNC: 29.6 MG/DL
WBC # BLD AUTO: 7.7 K/UL (ref 4.6–13.2)

## 2018-04-24 PROCEDURE — 80053 COMPREHEN METABOLIC PANEL: CPT | Performed by: INTERNAL MEDICINE

## 2018-04-24 PROCEDURE — 36415 COLL VENOUS BLD VENIPUNCTURE: CPT | Performed by: INTERNAL MEDICINE

## 2018-04-24 PROCEDURE — 80061 LIPID PANEL: CPT | Performed by: INTERNAL MEDICINE

## 2018-04-24 PROCEDURE — 85025 COMPLETE CBC W/AUTO DIFF WBC: CPT | Performed by: INTERNAL MEDICINE

## 2018-07-05 RX ORDER — LOSARTAN POTASSIUM AND HYDROCHLOROTHIAZIDE 12.5; 1 MG/1; MG/1
1 TABLET ORAL DAILY
Qty: 90 TAB | Refills: 3 | Status: SHIPPED | OUTPATIENT
Start: 2018-07-05 | End: 2019-06-13 | Stop reason: SDUPTHER

## 2018-07-05 RX ORDER — ATORVASTATIN CALCIUM 40 MG/1
TABLET, FILM COATED ORAL
Qty: 45 TAB | Refills: 3 | Status: SHIPPED | OUTPATIENT
Start: 2018-07-05 | End: 2020-01-20 | Stop reason: SDUPTHER

## 2018-09-04 RX ORDER — AMLODIPINE BESYLATE 10 MG/1
10 TABLET ORAL DAILY
Qty: 90 TAB | Refills: 3 | Status: SHIPPED | OUTPATIENT
Start: 2018-09-04 | End: 2019-10-16 | Stop reason: SDUPTHER

## 2018-09-19 DIAGNOSIS — I10 ESSENTIAL HYPERTENSION: Primary | ICD-10-CM

## 2018-10-10 ENCOUNTER — DOCUMENTATION ONLY (OUTPATIENT)
Dept: INTERNAL MEDICINE CLINIC | Age: 78
End: 2018-10-10

## 2018-10-10 NOTE — PROGRESS NOTES
Pharmacy Note: Immunization Update    Patient: Cisco Lizama. (74 y.o., 1940)     Patient's immunization history was updated to reflect information contained in the [place] and/or outside immunization/pharmacy records were reconciled within [facility]. Health Maintenance schedule updated when appropriate.     Current immunizations now reflect:       Immunization History   Administered Date(s) Administered    Influenza High Dose Vaccine PF 10/01/2016, 10/06/2017    Influenza Vaccine 10/01/2015    Influenza Vaccine (Quad) 09/18/2015    Pneumococcal Conjugate (PCV-13) 09/18/2015    Pneumococcal Polysaccharide (PPSV-23) 01/02/2005, 09/17/2011       Francisco Alvarado, PharmD, BCACP

## 2018-10-11 ENCOUNTER — OFFICE VISIT (OUTPATIENT)
Dept: INTERNAL MEDICINE CLINIC | Age: 78
End: 2018-10-11

## 2018-10-11 VITALS
OXYGEN SATURATION: 95 % | HEART RATE: 65 BPM | SYSTOLIC BLOOD PRESSURE: 110 MMHG | TEMPERATURE: 96.5 F | WEIGHT: 234 LBS | RESPIRATION RATE: 16 BRPM | BODY MASS INDEX: 33.5 KG/M2 | DIASTOLIC BLOOD PRESSURE: 75 MMHG | HEIGHT: 70 IN

## 2018-10-11 DIAGNOSIS — Z00.00 ROUTINE GENERAL MEDICAL EXAMINATION AT A HEALTH CARE FACILITY: Primary | ICD-10-CM

## 2018-10-11 DIAGNOSIS — E78.2 MIXED HYPERLIPIDEMIA: ICD-10-CM

## 2018-10-11 DIAGNOSIS — I48.0 PAROXYSMAL ATRIAL FIBRILLATION (HCC): ICD-10-CM

## 2018-10-11 DIAGNOSIS — Z23 ENCOUNTER FOR IMMUNIZATION: ICD-10-CM

## 2018-10-11 DIAGNOSIS — I10 ESSENTIAL HYPERTENSION: ICD-10-CM

## 2018-10-11 NOTE — PROGRESS NOTES
1. Have you been to the ER, urgent care clinic since your last visit? Hospitalized since your last visit? No 
 
2. Have you seen or consulted any other health care providers outside of the [address] [place] [address] since your last visit? Include any pap smears or colon screening.  No

## 2018-10-11 NOTE — PATIENT INSTRUCTIONS
Medicare Wellness Visit, Male The best way to live healthy is to have a lifestyle where you eat a well-balanced diet, exercise regularly, limit alcohol use, and quit all forms of tobacco/nicotine, if applicable. Regular preventive services are another way to keep healthy. Preventive services (vaccines, screening tests, monitoring & exams) can help personalize your care plan, which helps you manage your own care. Screening tests can find health problems at the earliest stages, when they are easiest to treat. [address] [facility] follows the current, evidence-based guidelines published by the [facility] ([facility]) when recommending preventive services for our patients. Because we follow these guidelines, sometimes recommendations change over time as research supports it. (For example, a prostate screening blood test is no longer routinely recommended for men with no symptoms.) Of course, you and your doctor may decide to screen more often for some diseases, based on your risk and co-morbidities (chronic disease you are already diagnosed with). Preventive services for you include: - Medicare offers their members a free annual wellness visit, which is time for you and your primary care provider to discuss and plan for your preventive service needs. Take advantage of this benefit every year! 
-All adults over age 72 should receive the recommended pneumonia vaccines. Current USPSTF guidelines recommend a series of two vaccines for the best pneumonia protection.  
-All adults should have a flu vaccine yearly and an ECG.  All adults age 61 and older should receive a shingles vaccine once in their lifetime.   
-All adults age 38-68 who are overweight should have a diabetes screening test once every three years.  
-Other screening tests & preventive services for persons with diabetes include: an eye exam to screen for diabetic retinopathy, a kidney function test, a foot exam, and stricter control over your cholesterol.  
-Cardiovascular screening for adults with routine risk involves an electrocardiogram (ECG) at intervals determined by the provider.  
-Colorectal cancer screening should be done for adults age 54-65 with no increased risk factors for colorectal cancer. There are a number of acceptable methods of screening for this type of cancer. Each test has its own benefits and drawbacks. Discuss with your provider what is most appropriate for you during your annual wellness visit. The different tests include: colonoscopy (considered the best screening method), a fecal occult blood test, a fecal DNA test, and sigmoidoscopy. 
-All adults born between [facility] should be screened once for Hepatitis C. 
-An Abdominal Aortic Aneurysm (AAA) Screening is recommended for men age 73-68 who has ever smoked in their lifetime. Here is a list of your current Health Maintenance items (your personalized list of preventive services) with a due date: 
Health Maintenance Due Topic Date Due  
 DTaP/Tdap/Td  (1 - Tdap) 04/03/1961  Shingles Vaccine (1 of 2) 04/03/1990  Glaucoma Screening   04/03/2005  Flu Vaccine  08/01/2018 [facility] Annual Well Visit  10/07/2018 DASH Diet: Care Instructions Your Care Instructions The DASH diet is an eating plan that can help lower your blood pressure. DASH stands for Dietary Approaches to Stop Hypertension. Hypertension is high blood pressure. The DASH diet focuses on eating foods that are high in calcium, potassium, and magnesium. These nutrients can lower blood pressure. The foods that are highest in these nutrients are fruits, vegetables, low-fat dairy products, nuts, seeds, and legumes. But taking calcium, potassium, and magnesium supplements instead of eating foods that are high in those nutrients does not have the same effect. The DASH diet also includes whole grains, fish, and poultry. The DASH diet is one of several lifestyle changes your doctor may recommend to lower your high blood pressure. Your doctor may also want you to decrease the amount of sodium in your diet. Lowering sodium while following the DASH diet can lower blood pressure even further than just the DASH diet alone. Follow-up care is a key part of your treatment and safety. Be sure to make and go to all appointments, and call your doctor if you are having problems. It's also a good idea to know your test results and keep a list of the medicines you take. How can you care for yourself at home? Following the DASH diet · Eat 4 to 5 servings of fruit each day. A serving is 1 medium-sized piece of fruit, ½ cup chopped or canned fruit, 1/4 cup dried fruit, or 4 ounces (½ cup) of fruit juice. Choose fruit more often than fruit juice. · Eat 4 to 5 servings of vegetables each day. A serving is 1 cup of lettuce or raw leafy vegetables, ½ cup of chopped or cooked vegetables, or 4 ounces (½ cup) of vegetable juice. Choose vegetables more often than vegetable juice. · Get 2 to 3 servings of low-fat and fat-free dairy each day. A serving is 8 ounces of milk, 1 cup of yogurt, or 1 ½ ounces of cheese. · Eat 6 to 8 servings of grains each day. A serving is 1 slice of bread, 1 ounce of dry cereal, or ½ cup of cooked rice, pasta, or cooked cereal. Try to choose whole-grain products as much as possible. · Limit lean meat, poultry, and fish to 2 servings each day. A serving is 3 ounces, about the size of a deck of cards. · Eat 4 to 5 servings of nuts, seeds, and legumes (cooked dried beans, lentils, and split peas) each week. A serving is 1/3 cup of nuts, 2 tablespoons of seeds, or ½ cup of cooked beans or peas. · Limit fats and oils to 2 to 3 servings each day. A serving is 1 teaspoon of vegetable oil or 2 tablespoons of salad dressing. · Limit sweets and added sugars to 5 servings or less a week.  A serving is 1 tablespoon jelly or jam, ½ cup sorbet, or 1 cup of lemonade. · Eat less than 2,300 milligrams (mg) of sodium a day. If you limit your sodium to 1,500 mg a day, you can lower your blood pressure even more. Tips for success · Start small. Do not try to make dramatic changes to your diet all at once. You might feel that you are missing out on your favorite foods and then be more likely to not follow the plan. Make small changes, and stick with them. Once those changes become habit, add a few more changes. · Try some of the following: ¨ Make it a goal to eat a fruit or vegetable at every meal and at snacks. This will make it easy to get the recommended amount of fruits and vegetables each day. ¨ Try yogurt topped with fruit and nuts for a snack or healthy dessert. ¨ Add lettuce, tomato, cucumber, and onion to sandwiches. ¨ Combine a ready-made pizza crust with low-fat mozzarella cheese and lots of vegetable toppings. Try using tomatoes, squash, spinach, broccoli, carrots, cauliflower, and onions. ¨ Have a variety of cut-up vegetables with a low-fat dip as an appetizer instead of chips and dip. ¨ Sprinkle sunflower seeds or chopped almonds over salads. Or try adding chopped walnuts or almonds to cooked vegetables. ¨ Try some vegetarian meals using beans and peas. Add garbanzo or kidney beans to salads. Make burritos and tacos with mashed yancey beans or black beans. Where can you learn more? Go to http://ana paula-carol.info/. Enter T132 in the search box to learn more about \"DASH Diet: Care Instructions. \" Current as of: December 6, 2017 Content Version: 11.8 © 4798-1856 [employer]. Care instructions adapted under license by [employer] (which disclaims liability or warranty for this information).  If you have questions about a medical condition or this instruction, always ask your healthcare professional. [employer] disclaims any warranty or liability for your use of this information. Body Mass Index: Care Instructions Your Care Instructions Body mass index (BMI) can help you see if your weight is raising your risk for health problems. It uses a formula to compare how much you weigh with how tall you are. · A BMI lower than 18.5 is considered underweight. · A BMI between 18.5 and 24.9 is considered healthy. · A BMI between 25 and 29.9 is considered overweight. A BMI of 30 or higher is considered obese. If your BMI is in the normal range, it means that you have a lower risk for weight-related health problems. If your BMI is in the overweight or obese range, you may be at increased risk for weight-related health problems, such as high blood pressure, heart disease, stroke, arthritis or joint pain, and diabetes. If your BMI is in the underweight range, you may be at increased risk for health problems such as fatigue, lower protection (immunity) against illness, muscle loss, bone loss, hair loss, and hormone problems. BMI is just one measure of your risk for weight-related health problems. You may be at higher risk for health problems if you are not active, you eat an unhealthy diet, or you drink too much alcohol or use tobacco products. Follow-up care is a key part of your treatment and safety. Be sure to make and go to all appointments, and call your doctor if you are having problems. It's also a good idea to know your test results and keep a list of the medicines you take. How can you care for yourself at home? · Practice healthy eating habits. This includes eating plenty of fruits, vegetables, whole grains, lean protein, and low-fat dairy. · If your doctor recommends it, get more exercise. Walking is a good choice. Bit by bit, increase the amount you walk every day. Try for at least 30 minutes on most days of the week. · Do not smoke. Smoking can increase your risk for health problems. If you need help quitting, talk to your doctor about stop-smoking programs and medicines. These can increase your chances of quitting for good. · Limit alcohol to 2 drinks a day for men and 1 drink a day for women. Too much alcohol can cause health problems. If you have a BMI higher than 25 · Your doctor may do other tests to check your risk for weight-related health problems. This may include measuring the distance around your waist. A waist measurement of more than 40 inches in men or 35 inches in women can increase the risk of weight-related health problems. · Talk with your doctor about steps you can take to stay healthy or improve your health. You may need to make lifestyle changes to lose weight and stay healthy, such as changing your diet and getting regular exercise. If you have a BMI lower than 18.5 · Your doctor may do other tests to check your risk for health problems. · Talk with your doctor about steps you can take to stay healthy or improve your health. You may need to make lifestyle changes to gain or maintain weight and stay healthy, such as getting more healthy foods in your diet and doing exercises to build muscle. Where can you learn more? Go to http://ana paula-carol.info/. Enter S176 in the search box to learn more about \"Body Mass Index: Care Instructions. \" Current as of: October 13, 2016 Content Version: 11.4 © 6027-8015 [employer]wise[employer]. Care instructions adapted under license by [employer] (which disclaims liability or warranty for this information). If you have questions about a medical condition or this instruction, always ask your healthcare professional. [place] [address] any warranty or liability for your use of this information.

## 2018-10-11 NOTE — MR AVS SNAPSHOT
[facility] 
 
 
 [address], [address] [facility] [address] 
[phone] Patient: Quirino Lindsey. MRN: [id number] Visit Information Date & Time Provider Department Dept. Phone Encounter #  
 10/11/2018  9:00 AM Varinder Mcbride MD [facility] [place] [phone] [id number] Follow-up Instructions Return if symptoms worsen or fail to improve. Your Appointments 10/11/2018  9:00 AM  
Follow Up with Varinder Mcbride MD  
[facility] [place] ([address]) Appt Note: 6 mo f/u; r/s  
 [address], [address] [facility] [address]  
  
   
 [address], [address] [facility] [address] 4/11/2019  9:15 AM  
Follow Up with Varinder Mcbride MD  
[facility] ([address]) Appt Note: 6mo  
 [address], [address] [facility] [address]  
[phone] Upcoming Health Maintenance Date Due DTaP/Tdap/Td series (1 - Tdap) 4/3/1961 Shingrix Vaccine Age 50> (1 of 2) 4/3/1990 GLAUCOMA SCREENING Q2Y 4/3/2005 Influenza Age 5 to Adult 8/1/2018 MEDICARE YEARLY EXAM 10/7/2018 COLONOSCOPY 6/6/2022 Allergies as of 10/11/2018  Review Complete On: 10/11/2018 By: Varinder Mcbride MD  
  
 Severity Noted Reaction Type Reactions Vicodin [Hydrocodone-acetaminophen]  11/20/2014    Swelling Current Immunizations  Reviewed on 10/10/2018 Name Date Influenza High Dose Vaccine PF 10/6/2017, 10/1/2016 Influenza Vaccine 10/1/2015 12:00 AM  
 Influenza Vaccine (Quad) 9/18/2015 Influenza Vaccine (Tri) Adjuvanted  Incomplete Pneumococcal Conjugate (PCV-13) 9/18/2015 Pneumococcal Polysaccharide (PPSV-23) 9/17/2011 12:00 AM, 1/2/2005 Not reviewed this visit You Were Diagnosed With   
  
 Codes Comments  Routine general medical examination at a health care facility    - Primary ICD-10-CM: Z00.00 ICD-9-CM: V70.0 Essential hypertension     ICD-10-CM: I10 
ICD-9-CM: 401.9 Mixed hyperlipidemia     ICD-10-CM: E78.2 ICD-9-CM: 272.2 Paroxysmal atrial fibrillation (HCC)     ICD-10-CM: I48.0 ICD-9-CM: 427.31 Encounter for immunization     ICD-10-CM: P28 ICD-9-CM: V03.89 Vitals BP Pulse Temp Resp Height(growth percentile) 110/75 (BP 1 Location: Left arm, BP Patient Position: Sitting) 65 96.5 °F (35.8 °C) (Tympanic) 16 5' 10\" (1.778 m) Weight(growth percentile) SpO2 BMI Smoking Status 234 lb (106.1 kg) 95% 33.58 kg/m2 Never Smoker Vitals History BMI and BSA Data Body Mass Index Body Surface Area 33.58 kg/m 2 2.29 m 2 Preferred Pharmacy Pharmacy Name Phone [address] [place] [address] [place] [address] [phone] [facility] [phone] Your Updated Medication List  
  
   
This list is accurate as of 10/11/18  8:58 AM.  Always use your most recent med list.  
  
  
  
  
 allopurinol 100 mg tablet Commonly known as:  Francyne Damme Take 1 Tab by mouth daily. amLODIPine 10 mg tablet Commonly known as:  Guillermina Chester Take 1 Tab by mouth daily. atorvastatin 40 mg tablet Commonly known as:  LIPITOR  
1/2 qd  
  
 losartan-hydroCHLOROthiazide 100-12.5 mg per tablet Commonly known as:  HYZAAR Take 1 Tab by mouth daily. rivaroxaban 20 mg Tab tablet Commonly known as:  Royal Stands Take 1 Tab by mouth daily. TENORMIN 50 mg tablet Generic drug:  atenolol Take  by mouth daily. We Performed the Following INFLUENZA VACCINE INACTIVATED (IIV), SUBUNIT, ADJUVANTED, IM [id number] CPT(R)] Follow-up Instructions Return if symptoms worsen or fail to improve. Patient Instructions Medicare Wellness Visit, Male The best way to live healthy is to have a lifestyle where you eat a well-balanced diet, exercise regularly, limit alcohol use, and quit all forms of tobacco/nicotine, if applicable. Regular preventive services are another way to keep healthy. Preventive services (vaccines, screening tests, monitoring & exams) can help personalize your care plan, which helps you manage your own care. Screening tests can find health problems at the earliest stages, when they are easiest to treat. [facility] follows the current, evidence-based guidelines published by the [facility] Alexand[facility] Araujo ([facility]) when recommending preventive services for our patients. Because we follow these guidelines, sometimes recommendations change over time as research supports it. (For example, a prostate screening blood test is no longer routinely recommended for men with no symptoms.) Of course, you and your doctor may decide to screen more often for some diseases, based on your risk and co-morbidities (chronic disease you are already diagnosed with). Preventive services for you include: - Medicare offers their members a free annual wellness visit, which is time for you and your primary care provider to discuss and plan for your preventive service needs. Take advantage of this benefit every year! 
-All adults over age 72 should receive the recommended pneumonia vaccines. Current USPSTF guidelines recommend a series of two vaccines for the best pneumonia protection.  
-All adults should have a flu vaccine yearly and an ECG.  All adults age 61 and older should receive a shingles vaccine once in their lifetime.   
-All adults age 38-68 who are overweight should have a diabetes screening test once every three years.  
-Other screening tests & preventive services for persons with diabetes include: an eye exam to screen for diabetic retinopathy, a kidney function test, a foot exam, and stricter control over your cholesterol.  
-Cardiovascular screening for adults with routine risk involves an electrocardiogram (ECG) at intervals determined by the provider.  
-Colorectal cancer screening should be done for adults age 54-65 with no increased risk factors for colorectal cancer. There are a number of acceptable methods of screening for this type of cancer. Each test has its own benefits and drawbacks. Discuss with your provider what is most appropriate for you during your annual wellness visit. The different tests include: colonoscopy (considered the best screening method), a fecal occult blood test, a fecal DNA test, and sigmoidoscopy. 
-All adults born between [facility] should be screened once for Hepatitis C. 
-An Abdominal Aortic Aneurysm (AAA) Screening is recommended for men age 73-68 who has ever smoked in their lifetime. Here is a list of your current Health Maintenance items (your personalized list of preventive services) with a due date: 
Health Maintenance Due Topic Date Due  
 DTaP/Tdap/Td  (1 - Tdap) 04/03/1961  Shingles Vaccine (1 of 2) 04/03/1990  Glaucoma Screening   04/03/2005  Flu Vaccine  08/01/2018 [place] Annual Well Visit  10/07/2018 DASH Diet: Care Instructions Your Care Instructions The DASH diet is an eating plan that can help lower your blood pressure. DASH stands for Dietary Approaches to Stop Hypertension. Hypertension is high blood pressure. The DASH diet focuses on eating foods that are high in calcium, potassium, and magnesium. These nutrients can lower blood pressure. The foods that are highest in these nutrients are fruits, vegetables, low-fat dairy products, nuts, seeds, and legumes. But taking calcium, potassium, and magnesium supplements instead of eating foods that are high in those nutrients does not have the same effect. The DASH diet also includes whole grains, fish, and poultry. The DASH diet is one of several lifestyle changes your doctor may recommend to lower your high blood pressure.  Your doctor may also want you to decrease the amount of sodium in your diet. Lowering sodium while following the DASH diet can lower blood pressure even further than just the DASH diet alone. Follow-up care is a key part of your treatment and safety. Be sure to make and go to all appointments, and call your doctor if you are having problems. It's also a good idea to know your test results and keep a list of the medicines you take. How can you care for yourself at home? Following the DASH diet · Eat 4 to 5 servings of fruit each day. A serving is 1 medium-sized piece of fruit, ½ cup chopped or canned fruit, 1/4 cup dried fruit, or 4 ounces (½ cup) of fruit juice. Choose fruit more often than fruit juice. · Eat 4 to 5 servings of vegetables each day. A serving is 1 cup of lettuce or raw leafy vegetables, ½ cup of chopped or cooked vegetables, or 4 ounces (½ cup) of vegetable juice. Choose vegetables more often than vegetable juice. · Get 2 to 3 servings of low-fat and fat-free dairy each day. A serving is 8 ounces of milk, 1 cup of yogurt, or 1 ½ ounces of cheese. · Eat 6 to 8 servings of grains each day. A serving is 1 slice of bread, 1 ounce of dry cereal, or ½ cup of cooked rice, pasta, or cooked cereal. Try to choose whole-grain products as much as possible. · Limit lean meat, poultry, and fish to 2 servings each day. A serving is 3 ounces, about the size of a deck of cards. · Eat 4 to 5 servings of nuts, seeds, and legumes (cooked dried beans, lentils, and split peas) each week. A serving is 1/3 cup of nuts, 2 tablespoons of seeds, or ½ cup of cooked beans or peas. · Limit fats and oils to 2 to 3 servings each day. A serving is 1 teaspoon of vegetable oil or 2 tablespoons of salad dressing. · Limit sweets and added sugars to 5 servings or less a week. A serving is 1 tablespoon jelly or jam, ½ cup sorbet, or 1 cup of lemonade. · Eat less than 2,300 milligrams (mg) of sodium a day.  If you limit your sodium to 1,500 mg a day, you can lower your blood pressure even more. Tips for success · Start small. Do not try to make dramatic changes to your diet all at once. You might feel that you are missing out on your favorite foods and then be more likely to not follow the plan. Make small changes, and stick with them. Once those changes become habit, add a few more changes. · Try some of the following: ¨ Make it a goal to eat a fruit or vegetable at every meal and at snacks. This will make it easy to get the recommended amount of fruits and vegetables each day. ¨ Try yogurt topped with fruit and nuts for a snack or healthy dessert. ¨ Add lettuce, tomato, cucumber, and onion to sandwiches. ¨ Combine a ready-made pizza crust with low-fat mozzarella cheese and lots of vegetable toppings. Try using tomatoes, squash, spinach, broccoli, carrots, cauliflower, and onions. ¨ Have a variety of cut-up vegetables with a low-fat dip as an appetizer instead of chips and dip. ¨ Sprinkle sunflower seeds or chopped almonds over salads. Or try adding chopped walnuts or almonds to cooked vegetables. ¨ Try some vegetarian meals using beans and peas. Add garbanzo or kidney beans to salads. Make burritos and tacos with mashed yancey beans or black beans. Where can you learn more? Go to http://ana paula-carol.info/. Enter L973 in the search box to learn more about \"DASH Diet: Care Instructions. \" Current as of: December 6, 2017 Content Version: 11.8 © 5342-2102 [employer]. Care instructions adapted under license by [employer] (which disclaims liability or warranty for this information). If you have questions about a medical condition or this instruction, always ask your healthcare professional. [place] [address] any warranty or liability for your use of this information. Body Mass Index: Care Instructions Your Care Instructions Body mass index (BMI) can help you see if your weight is raising your risk for health problems. It uses a formula to compare how much you weigh with how tall you are. · A BMI lower than 18.5 is considered underweight. · A BMI between 18.5 and 24.9 is considered healthy. · A BMI between 25 and 29.9 is considered overweight. A BMI of 30 or higher is considered obese. If your BMI is in the normal range, it means that you have a lower risk for weight-related health problems. If your BMI is in the overweight or obese range, you may be at increased risk for weight-related health problems, such as high blood pressure, heart disease, stroke, arthritis or joint pain, and diabetes. If your BMI is in the underweight range, you may be at increased risk for health problems such as fatigue, lower protection (immunity) against illness, muscle loss, bone loss, hair loss, and hormone problems. BMI is just one measure of your risk for weight-related health problems. You may be at higher risk for health problems if you are not active, you eat an unhealthy diet, or you drink too much alcohol or use tobacco products. Follow-up care is a key part of your treatment and safety. Be sure to make and go to all appointments, and call your doctor if you are having problems. It's also a good idea to know your test results and keep a list of the medicines you take. How can you care for yourself at home? · Practice healthy eating habits. This includes eating plenty of fruits, vegetables, whole grains, lean protein, and low-fat dairy. · If your doctor recommends it, get more exercise. Walking is a good choice. Bit by bit, increase the amount you walk every day. Try for at least 30 minutes on most days of the week. · Do not smoke. Smoking can increase your risk for health problems. If you need help quitting, talk to your doctor about stop-smoking programs and medicines. These can increase your chances of quitting for good. · Limit alcohol to 2 drinks a day for men and 1 drink a day for women. Too much alcohol can cause health problems. If you have a BMI higher than 25 · Your doctor may do other tests to check your risk for weight-related health problems. This may include measuring the distance around your waist. A waist measurement of more than 40 inches in men or 35 inches in women can increase the risk of weight-related health problems. · Talk with your doctor about steps you can take to stay healthy or improve your health. You may need to make lifestyle changes to lose weight and stay healthy, such as changing your diet and getting regular exercise. If you have a BMI lower than 18.5 · Your doctor may do other tests to check your risk for health problems. · Talk with your doctor about steps you can take to stay healthy or improve your health. You may need to make lifestyle changes to gain or maintain weight and stay healthy, such as getting more healthy foods in your diet and doing exercises to build muscle. Where can you learn more? Go to http://ana paula-carol.info/. Enter S176 in the search box to learn more about \"Body Mass Index: Care Instructions. \" Current as of: October 13, 2016 Content Version: 11.4 © 3910-6621 [employer]. Care instructions adapted under license by [employer] (which disclaims liability or warranty for this information). If you have questions about a medical condition or this instruction, always ask your healthcare professional. [place] [address] any warranty or liability for your use of this information. Introducing [facility]! Americo Tompkins introduces Odilo patient portal. Now you can access parts of your medical record, email your doctor's office, and request medication refills online. 1. In your internet browser, go to [email or web address] 2. Click on the First Time User? Click Here link in the Sign In box. You will see the New Member Sign Up page. 3. Enter your EPV SOLAR Access Code exactly as it appears below. You will not need to use this code after youve completed the sign-up process. If you do not sign up before the expiration date, you must request a new code. · EPV SOLAR Access Code: [email or web address] Expires: 1/9/2019  8:58 AM 
 
4. Enter the last four digits of your Social Security Number (xxxx) and Date of Birth (mm/dd/yyyy) as indicated and click Submit. You will be taken to the next sign-up page. 5. Create a EPV SOLAR ID. This will be your EPV SOLAR login ID and cannot be changed, so think of one that is secure and easy to remember. 6. Create a EPV SOLAR password. You can change your password at any time. 7. Enter your Password Reset Question and Answer. This can be used at a later time if you forget your password. 8. Enter your e-mail address. You will receive e-mail notification when new information is available in [address] 9. Click Sign Up. You can now view and download portions of your medical record. 10. Click the Download Summary menu link to download a portable copy of your medical information. If you have questions, please visit the Frequently Asked Questions section of the EPV SOLAR website. Remember, EPV SOLAR is NOT to be used for urgent needs. For medical emergencies, dial 911. Now available from your iPhone and Android! Please provide this summary of care documentation to your next provider. Your primary care clinician is listed as Ina Chapin. If you have any questions after today's visit, please call [phone].

## 2018-10-24 RX ORDER — ALLOPURINOL 100 MG/1
100 TABLET ORAL DAILY
Qty: 90 TAB | Refills: 4 | Status: SHIPPED | OUTPATIENT
Start: 2018-10-24 | End: 2019-10-16 | Stop reason: SDUPTHER

## 2019-03-18 DIAGNOSIS — I10 ESSENTIAL HYPERTENSION: Primary | ICD-10-CM

## 2019-04-03 NOTE — PROGRESS NOTES
HPI:  
F/u visit for routine evaluation of HTN, PAF, hyperlipidemia Chol 136 April 2018 No acute issues w/o chest pain/abd. discomfort; no dyspnea, cough or increased pedal edema; denies constitutional complaints of fever, night sweats or wt loss; no evidence of GI/ hemorrhage ROS is otherwise negative. Past Medical History:  
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  
Procedure Laterality Date  COLONOSCOPY N/A 1/11/2019 COLONOSCOPY, SURVEILLANCE hot poplypectomy, w ENDOCLIP x 5 performed by Lorenzo Vaughn MD at [facility] ENDOSCOPY  
 HX COLECTOMY Right 5/2012  
 large polyps ([place])  HX COLONOSCOPY  4/27/12  HX HERNIA REPAIR Right  HX KNEE REPLACEMENT Right 1/2016  HX ORTHOPAEDIC Bilateral   
 hand  HX OTHER SURGICAL  2005  
 melanoma R arm  HX PROSTATECTOMY  2001 CA  
 HX UROLOGICAL  1990  
 bladder CA ([facility]); TURB with XRT Social History Socioeconomic History  Marital status:  Spouse name: Not on file  Number of children: Not on file  Years of education: Not on file  Highest education level: Not on file Occupational History  Not on file Social Needs  Financial resource strain: Not on file  Food insecurity:  
  Worry: Not on file Inability: Not on file  Transportation needs:  
  Medical: Not on file Non-medical: Not on file Tobacco Use  Smoking status: Never Smoker  Smokeless tobacco: Never Used Substance and Sexual Activity  Alcohol use: Yes Alcohol/week: 0.0 oz  Drug use: No  
 Sexual activity: Never Lifestyle  Physical activity:  
  Days per week: Not on file Minutes per session: Not on file  Stress: Not on file Relationships  Social connections:  
  Talks on phone: Not on file Gets together: Not on file Attends Hoahaoism service: Not on file Active member of club or organization: Not on file Attends meetings of clubs or organizations: Not on file Relationship status: Not on file  Intimate partner violence:  
  Fear of current or ex partner: Not on file Emotionally abused: Not on file Physically abused: Not on file Forced sexual activity: Not on file Other Topics Concern  Not on file Social History Narrative  Not on file Allergies Allergen Reactions  Vicodin [Hydrocodone-Acetaminophen] Swelling Family History Problem Relation Age of Onset  Heart Disease Father  Cancer Sister   
     breast  
 
 
Current Outpatient Medications Medication Sig Dispense Refill  allopurinol (ZYLOPRIM) 100 mg tablet Take 1 Tab by mouth daily. 90 Tab 4  
 amLODIPine (NORVASC) 10 mg tablet Take 1 Tab by mouth daily. 90 Tab 3  
 losartan-hydroCHLOROthiazide (HYZAAR) 100-12.5 mg per tablet Take 1 Tab by mouth daily. 90 Tab 3  
 atorvastatin (LIPITOR) 40 mg tablet 1/2 qd 45 Tab 3  
 atenolol (TENORMIN) 50 mg tablet Take  by mouth daily. Visit Vitals /80 Pulse 75 Temp 98 °F (36.7 °C) Resp 15 Ht 5' 10\" (1.778 m) Wt 240 lb (108.9 kg) BMI 34.44 kg/m² PE Well nourished in NAD HEENT:  OP: clear. Neck: supple w/o mass or bruits. Chest: clear. CV: RRR w/o m,r,g; pulses NP distal legs Abd: soft, NT, w/o HSM or mass. Ext: tr - 1+ edema. Neuro: NF. Assessment and Plan Encounter Diagnoses Name Primary?  Essential hypertension Yes  Paroxysmal atrial fibrillation (HCC)  Mixed hyperlipidemia BP @ goal 
Labs to assess metabolic parameters (hyperlipidemia) PAF - anticoagulated with xarelt Continue dietary/exercise efforts No change in rx OV 6 mos or prn 
 I have explained plan to patient and the patient verbalizes understanding Discussed the patient's BMI with him. The BMI follow up plan is as follows:  
 
dietary management education, guidance, and counseling 
encourage exercise 
monitor weight 
prescribed dietary intake An After Visit Summary was printed and given to the patient.

## 2019-04-10 ENCOUNTER — OFFICE VISIT (OUTPATIENT)
Dept: FAMILY MEDICINE CLINIC | Age: 79
End: 2019-04-10

## 2019-04-10 VITALS
TEMPERATURE: 98 F | HEIGHT: 70 IN | BODY MASS INDEX: 34.36 KG/M2 | WEIGHT: 240 LBS | DIASTOLIC BLOOD PRESSURE: 80 MMHG | RESPIRATION RATE: 15 BRPM | SYSTOLIC BLOOD PRESSURE: 130 MMHG | HEART RATE: 75 BPM

## 2019-04-10 DIAGNOSIS — E78.2 MIXED HYPERLIPIDEMIA: ICD-10-CM

## 2019-04-10 DIAGNOSIS — I48.0 PAROXYSMAL ATRIAL FIBRILLATION (HCC): ICD-10-CM

## 2019-04-10 DIAGNOSIS — I10 ESSENTIAL HYPERTENSION: Primary | ICD-10-CM

## 2019-04-10 NOTE — PATIENT INSTRUCTIONS
Atrial Fibrillation: Care Instructions Your Care Instructions Atrial fibrillation is an irregular and often fast heartbeat. Treating this condition is important for several reasons. It can cause blood clots, which can travel from your heart to your brain and cause a stroke. If you have a fast heartbeat, you may feel lightheaded, dizzy, and weak. An irregular heartbeat can also increase your risk for heart failure. Atrial fibrillation is often the result of another heart condition, such as high blood pressure or coronary artery disease. Making changes to improve your heart condition will help you stay healthy and active. Follow-up care is a key part of your treatment and safety. Be sure to make and go to all appointments, and call your doctor if you are having problems. It's also a good idea to know your test results and keep a list of the medicines you take. How can you care for yourself at home? Medicines 
  · Take your medicines exactly as prescribed. Call your doctor if you think you are having a problem with your medicine. You will get more details on the specific medicines your doctor prescribes.  
  · If your doctor has given you a blood thinner to prevent a stroke, be sure you get instructions about how to take your medicine safely. Blood thinners can cause serious bleeding problems.  
  · Do not take any vitamins, over-the-counter drugs, or herbal products without talking to your doctor first.  
 Lifestyle changes 
  · Do not smoke. Smoking can increase your chance of a stroke and heart attack. If you need help quitting, talk to your doctor about stop-smoking programs and medicines. These can increase your chances of quitting for good.  
  · Eat a heart-healthy diet.  
  · Stay at a healthy weight. Lose weight if you need to.  
  · Limit alcohol to 2 drinks a day for men and 1 drink a day for women. Too much alcohol can cause health problems.   · Avoid colds and flu. Get a pneumococcal vaccine shot. If you have had one before, ask your doctor whether you need another dose. Get a flu shot every year. If you must be around people with colds or flu, wash your hands often. Activity 
  · If your doctor recommends it, get more exercise. Walking is a good choice. Bit by bit, increase the amount you walk every day. Try for at least 30 minutes on most days of the week. You also may want to swim, bike, or do other activities. Your doctor may suggest that you join a cardiac rehabilitation program so that you can have help increasing your physical activity safely.  
  · Start light exercise if your doctor says it is okay. Even a small amount will help you get stronger, have more energy, and manage stress. Walking is an easy way to get exercise. Start out by walking a little more than you did in the hospital. Gradually increase the amount you walk.  
  · When you exercise, watch for signs that your heart is working too hard. You are pushing too hard if you cannot talk while you are exercising. If you become short of breath or dizzy or have chest pain, sit down and rest immediately.  
  · Check your pulse regularly. Place two fingers on the artery at the palm side of your wrist, in line with your thumb. If your heartbeat seems uneven or fast, talk to your doctor. When should you call for help? Call 911 anytime you think you may need emergency care. For example, call if: 
  · You have symptoms of a heart attack. These may include: 
? Chest pain or pressure, or a strange feeling in the chest. 
? Sweating. ? Shortness of breath. ? Nausea or vomiting. ? Pain, pressure, or a strange feeling in the back, neck, jaw, or upper belly or in one or both shoulders or arms. ? Lightheadedness or sudden weakness. ? A fast or irregular heartbeat.  
After you call 911, the  may tell you to chew 1 adult-strength or 2 to 4 low-dose aspirin. Wait for an ambulance. Do not try to drive yourself.  
  · You have symptoms of a stroke. These may include: 
? Sudden numbness, tingling, weakness, or loss of movement in your face, arm, or leg, especially on only one side of your body. ? Sudden vision changes. ? Sudden trouble speaking. ? Sudden confusion or trouble understanding simple statements. ? Sudden problems with walking or balance. ? A sudden, severe headache that is different from past headaches.  
  · You passed out (lost consciousness).  
 Call your doctor now or seek immediate medical care if: 
  · You have new or increased shortness of breath.  
  · You feel dizzy or lightheaded, or you feel like you may faint.  
  · Your heart rate becomes irregular.  
  · You can feel your heart flutter in your chest or skip heartbeats. Tell your doctor if these symptoms are new or worse.  
 Watch closely for changes in your health, and be sure to contact your doctor if you have any problems. Where can you learn more? Go to http://ana paula-carol.info/. Enter U020 in the search box to learn more about \"Atrial Fibrillation: Care Instructions. \" Current as of: July 22, 2018 Content Version: 11.9 © 5189-3283 [employer]. Care instructions adapted under license by [employer] (which disclaims liability or warranty for this information). If you have questions about a medical condition or this instruction, always ask your healthcare professional. [address] any warranty or liability for your use of this information. Body Mass Index: Care Instructions Your Care Instructions Body mass index (BMI) can help you see if your weight is raising your risk for health problems. It uses a formula to compare how much you weigh with how tall you are. · A BMI lower than 18.5 is considered underweight. · A BMI between 18.5 and 24.9 is considered healthy. · A BMI between 25 and 29.9 is considered overweight. A BMI of 30 or higher is considered obese. If your BMI is in the normal range, it means that you have a lower risk for weight-related health problems. If your BMI is in the overweight or obese range, you may be at increased risk for weight-related health problems, such as high blood pressure, heart disease, stroke, arthritis or joint pain, and diabetes. If your BMI is in the underweight range, you may be at increased risk for health problems such as fatigue, lower protection (immunity) against illness, muscle loss, bone loss, hair loss, and hormone problems. BMI is just one measure of your risk for weight-related health problems. You may be at higher risk for health problems if you are not active, you eat an unhealthy diet, or you drink too much alcohol or use tobacco products. Follow-up care is a key part of your treatment and safety. Be sure to make and go to all appointments, and call your doctor if you are having problems. It's also a good idea to know your test results and keep a list of the medicines you take. How can you care for yourself at home? · Practice healthy eating habits. This includes eating plenty of fruits, vegetables, whole grains, lean protein, and low-fat dairy. · If your doctor recommends it, get more exercise. Walking is a good choice. Bit by bit, increase the amount you walk every day. Try for at least 30 minutes on most days of the week. · Do not smoke. Smoking can increase your risk for health problems. If you need help quitting, talk to your doctor about stop-smoking programs and medicines. These can increase your chances of quitting for good. · Limit alcohol to 2 drinks a day for men and 1 drink a day for women. Too much alcohol can cause health problems. If you have a BMI higher than 25 · Your doctor may do other tests to check your risk for weight-related health problems.  This may include measuring the distance around your waist. A waist measurement of more than 40 inches in men or 35 inches in women can increase the risk of weight-related health problems. · Talk with your doctor about steps you can take to stay healthy or improve your health. You may need to make lifestyle changes to lose weight and stay healthy, such as changing your diet and getting regular exercise. If you have a BMI lower than 18.5 · Your doctor may do other tests to check your risk for health problems. · Talk with your doctor about steps you can take to stay healthy or improve your health. You may need to make lifestyle changes to gain or maintain weight and stay healthy, such as getting more healthy foods in your diet and doing exercises to build muscle. Where can you learn more? Go to http://ana paula-carol.info/. Enter S176 in the search box to learn more about \"Body Mass Index: Care Instructions. \" Current as of: October 13, 2016 Content Version: 11.4 © 5770-4233 [employer], [employer]. Care instructions adapted under license by [employer] (which disclaims liability or warranty for this information). If you have questions about a medical condition or this instruction, always ask your healthcare professional. [place] [address] any warranty or liability for your use of this information.

## 2019-04-12 ENCOUNTER — HOSPITAL ENCOUNTER (OUTPATIENT)
Dept: LAB | Age: 79
Discharge: HOME | End: 2019-04-12
Payer: MEDICARE

## 2019-04-12 DIAGNOSIS — I10 ESSENTIAL HYPERTENSION: ICD-10-CM

## 2019-04-12 LAB
ALBUMIN SERPL-MCNC: 3.3 G/DL (ref 3.4–5)
ALBUMIN/GLOB SERPL: 1 {RATIO} (ref 0.8–1.7)
ALP SERPL-CCNC: 100 U/L (ref 45–117)
ALT SERPL-CCNC: 14 U/L (ref 16–61)
ANION GAP SERPL CALC-SCNC: 6 MMOL/L (ref 3–18)
AST SERPL-CCNC: 14 U/L (ref 15–37)
BASOPHILS # BLD: 0 K/UL (ref 0–0.1)
BASOPHILS NFR BLD: 0 % (ref 0–2)
BILIRUB SERPL-MCNC: 0.7 MG/DL (ref 0.2–1)
BUN SERPL-MCNC: 20 MG/DL (ref 7–18)
BUN/CREAT SERPL: 14 (ref 12–20)
CALCIUM SERPL-MCNC: 8.6 MG/DL (ref 8.5–10.1)
CHLORIDE SERPL-SCNC: 104 MMOL/L (ref 100–108)
CHOLEST SERPL-MCNC: 149 MG/DL
CO2 SERPL-SCNC: 33 MMOL/L (ref 21–32)
CREAT SERPL-MCNC: 1.43 MG/DL (ref 0.6–1.3)
DIFFERENTIAL METHOD BLD: ABNORMAL
EOSINOPHIL # BLD: 0.1 K/UL (ref 0–0.4)
EOSINOPHIL NFR BLD: 2 % (ref 0–5)
ERYTHROCYTE [DISTWIDTH] IN BLOOD BY AUTOMATED COUNT: 15.8 % (ref 11.6–14.5)
GLOBULIN SER CALC-MCNC: 3.2 G/DL (ref 2–4)
GLUCOSE SERPL-MCNC: 102 MG/DL (ref 74–99)
HCT VFR BLD AUTO: 44.6 % (ref 36–48)
HDLC SERPL-MCNC: 49 MG/DL (ref 40–60)
HDLC SERPL: 3 {RATIO} (ref 0–5)
HGB BLD-MCNC: 14.5 G/DL (ref 13–16)
LDLC SERPL CALC-MCNC: 81.2 MG/DL (ref 0–100)
LIPID PROFILE,FLP: NORMAL
LYMPHOCYTES # BLD: 1.3 K/UL (ref 0.9–3.6)
LYMPHOCYTES NFR BLD: 16 % (ref 21–52)
MCH RBC QN AUTO: 28.2 PG (ref 24–34)
MCHC RBC AUTO-ENTMCNC: 32.5 G/DL (ref 31–37)
MCV RBC AUTO: 86.8 FL (ref 74–97)
MONOCYTES # BLD: 0.5 K/UL (ref 0.05–1.2)
MONOCYTES NFR BLD: 6 % (ref 3–10)
NEUTS SEG # BLD: 6.1 K/UL (ref 1.8–8)
NEUTS SEG NFR BLD: 76 % (ref 40–73)
PLATELET # BLD AUTO: 188 K/UL (ref 135–420)
PMV BLD AUTO: 10.6 FL (ref 9.2–11.8)
POTASSIUM SERPL-SCNC: 3.8 MMOL/L (ref 3.5–5.5)
PROT SERPL-MCNC: 6.5 G/DL (ref 6.4–8.2)
RBC # BLD AUTO: 5.14 M/UL (ref 4.7–5.5)
SODIUM SERPL-SCNC: 143 MMOL/L (ref 136–145)
TRIGL SERPL-MCNC: 94 MG/DL (ref ?–150)
VLDLC SERPL CALC-MCNC: 18.8 MG/DL
WBC # BLD AUTO: 8.1 K/UL (ref 4.6–13.2)

## 2019-04-12 PROCEDURE — 80061 LIPID PANEL: CPT

## 2019-04-12 PROCEDURE — 85025 COMPLETE CBC W/AUTO DIFF WBC: CPT

## 2019-04-12 PROCEDURE — 80053 COMPREHEN METABOLIC PANEL: CPT

## 2019-04-12 PROCEDURE — 36415 COLL VENOUS BLD VENIPUNCTURE: CPT

## 2019-05-15 ENCOUNTER — OFFICE VISIT (OUTPATIENT)
Dept: ORTHOPEDIC SURGERY | Age: 79
End: 2019-05-15

## 2019-05-15 VITALS
RESPIRATION RATE: 16 BRPM | DIASTOLIC BLOOD PRESSURE: 83 MMHG | HEIGHT: 70 IN | WEIGHT: 242 LBS | BODY MASS INDEX: 34.65 KG/M2 | HEART RATE: 53 BPM | OXYGEN SATURATION: 97 % | SYSTOLIC BLOOD PRESSURE: 133 MMHG | TEMPERATURE: 97.7 F

## 2019-05-15 DIAGNOSIS — M54.2 NECK PAIN: Primary | ICD-10-CM

## 2019-05-15 DIAGNOSIS — M54.2 MUSCLE PAIN, CERVICAL: ICD-10-CM

## 2019-05-15 NOTE — PROGRESS NOTES
Chief complaint/History of Present Illness:  Chief Complaint   Patient presents with    Neck Pain     neck pain     HPI  Grimes Heike Miranda. is a  78 y.o.  male      HISTORY OF PRESENT ILLNESS:  Mr. Kaitlynn Schulz is a new patient to our practice. He states that four or five months ago he started having right-sided neck pain without any injury. It feels like a knot in the muscle on the right. He has seen Dr. Low Herrera who did lab work. It does not show any signs for infection. He states when he turns his head it can hurt, then his neck does grind. He denies any arm pain or radicular pain. 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 He is unable to take NSAIDs. He has a history of A-fib, gout, hypertension, increased lipedema, and prostate cancer, PE with DVT, venous insufficiency, melanoma, and bladder cancer. Surgeries include a TURP, prostate resection, right total knee replacement, and a colectomy. He states he saw Dr. Yajaira Pritchard a little while back and had a right shoulder injection, which actually helped that place in his neck for about one week. He denies fever and bowel or bladder dysfunction. He is retired. He is a nonsmoker. PHYSICAL EXAM:  Mr. Kaitlynn Schulz is a 51-year-old male. He is alert and oriented. He has a normal mood and affect. He has a full weightbearing, non-antalgic gait, using no assistive device. He has 5/5 strength of the bilateral upper extremities and negative Fernandezs. He has a normal tandem gait. ASSESSENT/PLAN:  This is a patient who has neck pain for the past four to five months. He has a little knot on the right side of his posterior neck. We did a cervical AP and lateral x-ray. These will be read by Dr. Ezequiel Rasheed. It does show a lot of arthritis. I am going to put him in some physical therapy with dry needling if he is able to have that done. He is unable to take NSAIDs. We will see him back in three months or sooner if needed.         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    Venous insufficiency      Past Surgical History:   Procedure Laterality Date    COLONOSCOPY N/A 1/11/2019    COLONOSCOPY, SURVEILLANCE hot poplypectomy, w ENDOCLIP x 5 performed by Zulema Carney MD at [address] Right 5/2012    large polyps ([address]    HX COLONOSCOPY  4/27/12    HX HERNIA REPAIR Right     HX KNEE REPLACEMENT Right 1/2016    HX ORTHOPAEDIC Bilateral     hand    HX OTHER SURGICAL  2005    melanoma R arm    [place]    HX UROLOGICAL  1990    bladder CA ([facility]); TURB with XRT     Social History     Socioeconomic History    Marital status:      Spouse name: Not on file    Number of children: Not on file    Years of education: Not on file    Highest education level: Not on file   Occupational History    Not on file   Social Needs    Financial resource strain: Not on file    Food insecurity:     Worry: Not on file     Inability: Not on file    Transportation needs:     Medical: Not on file     Non-medical: Not on file   Tobacco Use    Smoking status: Never Smoker    Smokeless tobacco: Never Used   Substance and Sexual Activity    Alcohol use:  Yes     Alcohol/week: 0.0 oz    Drug use: No    Sexual activity: Never   Lifestyle    Physical activity:     Days per week: Not on file     Minutes per session: Not on file    Stress: Not on file   Relationships    Social connections:     Talks on phone: Not on file     Gets together: Not on file     Attends Taoist service: Not on file     Active member of club or organization: Not on file     Attends meetings of clubs or organizations: Not on file     Relationship status: Not on file    Intimate partner violence:     Fear of current or ex partner: Not on file     Emotionally abused: Not on file     Physically abused: Not on file     Forced sexual activity: Not on file   Other Topics Concern    Not on file   Social History Narrative    Not on file     Family History   Problem Relation Age of Onset    Heart Disease Father     Cancer Sister         breast       Physical Exam:  Visit Vitals  /83   Pulse (!) 53   Temp 97.7 °F (36.5 °C)   Resp 16   Ht 5' 10\" (1.778 m)   Wt 242 lb (109.8 kg)   SpO2 97%   BMI 34.72 kg/m²     Pain Scale: 4/10     has been . reviewed and is appropriate          Diagnoses and all orders for this visit:    1. Neck pain  -     AMB POC XRAY, SPINE, CERVICAL; 2 OR 3  -     REFERRAL TO PHYSICAL THERAPY    2. Muscle pain, cervical  -     REFERRAL TO PHYSICAL THERAPY            Follow-up and Dispositions    · Return in about 3 months (around 8/15/2019) for with NP.              We have informed Elle Yamil Hinojosa to notify us for immediate appointment if he has any worsening neurogical symptoms or if an emergency situation presents, then call [phone]

## 2019-05-23 ENCOUNTER — HOSPITAL ENCOUNTER (OUTPATIENT)
Dept: PHYSICAL THERAPY | Age: 79
Discharge: HOME | End: 2019-05-23
Payer: MEDICARE

## 2019-05-23 PROCEDURE — 97140 MANUAL THERAPY 1/> REGIONS: CPT

## 2019-05-23 PROCEDURE — 97162 PT EVAL MOD COMPLEX 30 MIN: CPT

## 2019-05-23 PROCEDURE — 97110 THERAPEUTIC EXERCISES: CPT

## 2019-05-23 NOTE — PROGRESS NOTES
[facility]  [address]  [place], [address]  [phone] fax    Plan of Care/ Statement of Necessity for Physical Therapy Services    Patient name: Remi Yates of Care: 2019   Referral source: Rivera Pulido NP : 1940    Medical Diagnosis: Cervicalgia [M54.2]  Payor: [employer] Palma / Plan: [place] MED[place] PART A & B / Product Type: Medicare /  Onset Date: 5 months ago    Treatment Diagnosis: Cervical pain    Prior Hospitalization: see medical history Provider#: [id number]   Medications: Verified on Patient summary List    Comorbidities: arthritis, back pain, cancer, HBP, Afib   Prior Level of Function: (I) with all ADLs and participates in golf every Tuesday. The Plan of Care and following information is based on the information from the initial evaluation. Assessment/ key information: Patient is a 78 y.o male presenting with a chief complaint of right sided cervical pain that began about 5 months ago with unknown DIONISIO. Patient reports pain is only with movements especiallly with rotation and side bending. Patient denies any radiating pain. No red flags present. Patient states he is currently receiving cortisone injections for his right shoulder. Patient presents with decreased cervical AROM (especially with rotation and side bending), decreaesd muscular flexibility, decreased c/s and t/s joint mobility, tenderness to palpate right UT, forward head posture, minor scapular winging. Patient demonstrates (B) shoulder AROM WNL. Patient will benefit from skilled physical therapy in order to improve ease of ADLs and recreational activities.    Evaluation Complexity History MEDIUM  Complexity : 1-2 comorbidities / personal factors will impact the outcome/ POC ; Examination MEDIUM Complexity : 3 Standardized tests and measures addressing body structure, function, activity limitation and / or participation in recreation ;Presentation MEDIUM Complexity : Evolving with changing characteristics  ; Clinical Decision Making MEDIUM Complexity : FOTO score of 26-74  Overall Complexity Rating: MEDIUM  Problem List: pain affecting function, decrease ROM, decrease strength, decrease ADL/ functional abilitiies, decrease activity tolerance and decrease flexibility/ joint mobility   Treatment Plan may include any combination of the following: Therapeutic exercise, Neuromuscular re-education, Physical agent/modality, Manual therapy, Patient education and Functional mobility training  Patient / Family readiness to learn indicated by: asking questions, trying to perform skills and interest  Persons(s) to be included in education: patient (P)  Barriers to Learning/Limitations: None  Patient Goal (s): decrease the pain and be able to self manage it  Patient Self Reported Health Status: fair  Rehabilitation Potential: good    Short Term Goals: To be accomplished in 2 weeks:   1. Patient will be independent with HEP 2x day in order to improve ease of ADLs. 2. Patient will be able to decrease pain to 1/10 in order to improve activity tolerance. Long Term Goals: To be accomplished in 4 weeks:   1. Patient will be able to improve (B) cervical rotation to 45 degrees in order to improve ease of turning head while driving. 2. Patient will be able to improve cervical flexion to 40 degrees void of pain in order to improve ease of ADLs. 3. Patient will be able to play a round of golf reporting no pain in order to return to recreational activities with improved tolerance. Frequency / Duration: Patient to be seen 2 times per week for 4 weeks.     Patient/ Caregiver education and instruction: Diagnosis, prognosis, activity modification and exercises   [x]  Plan of care has been reviewed with PTA    Certification Period: 5/23/19 - 6/22/19  Shanon Macias PT 5/23/2019 9:47 AM    ________________________________________________________________________    I certify that the above Therapy Services are being furnished while the patient is under my care. I agree with the treatment plan and certify that this therapy is necessary.     [facility] Signature:____________________  Date:____________Time: _________    Please sign and return to [facility] [address] [place] [address] & [facility] [address]  [address]  [place], [facility] [address]  [phone] fax

## 2019-05-23 NOTE — PROGRESS NOTES
PT DAILY TREATMENT NOTE 10-18    Patient Name: Talha Berman.  Date:2019  : 1940  [x]  Patient  Verified   Payor: [place] MEDICARE / Plan: [place] MEDICARE PART A & B / Product Type: Medicare /    In time:9:03  Out time:9:50  Total Treatment Time (min): 52  Visit #: 1 of 8    Medicare/BCBS Only   Total Timed Codes (min):  27 1:1 Treatment Time:  47       Treatment Area: Cervicalgia [M54.2]    SUBJECTIVE  Pain Level (0-10 scale): 3  Any medication changes, allergies to medications, adverse drug reactions, diagnosis change, or new procedure performed?: [x] No    [] Yes (see summary sheet for update)  Subjective functional status/changes:   [] No changes reported  Patient reports mild discomfort on the right side of his neck with any movement especially rotation and side bending. OBJECTIVE      20 min [x]Eval                  []Re-Eval       19 min Therapeutic Exercise:  [x] See flow sheet :   Rationale: increase ROM and increase strength to improve the patients ability to perform ADLs with improved ease. 8 min Manual Therapy:  C/S and T/S PA and UPA mobs, STM right UT   Rationale: increase ROM and increase tissue extensibility to improve patients functional mobility.          With   [] TE   [] TA   [] neuro   [] other: Patient Education: [x] Review HEP    [] Progressed/Changed HEP based on:   [] positioning   [] body mechanics   [] transfers   [] heat/ice application    [] other:      Other Objective/Functional Measures: See POC     Pain Level (0-10 scale) post treatment: 2    ASSESSMENT/Changes in Function: See POC    Patient will continue to benefit from skilled PT services to modify and progress therapeutic interventions, address functional mobility deficits, address ROM deficits, address strength deficits, analyze and address soft tissue restrictions, analyze and cue movement patterns, analyze and modify body mechanics/ergonomics and assess and modify postural abnormalities to attain remaining goals. [x]  See Plan of Care  []  See progress note/recertification  []  See Discharge Summary         Progress towards goals / Updated goals:  Short Term Goals: To be accomplished in 2 weeks:              1. Patient will be independent with HEP 2x day in order to improve ease of ADLs. 2. Patient will be able to decrease pain to 1/10 in order to improve activity tolerance. Long Term Goals: To be accomplished in 4 weeks:              1. Patient will be able to improve (B) cervical rotation to 45 degrees in order to improve ease of turning head while driving. 2. Patient will be able to improve cervical flexion to 40 degrees void of pain in order to improve ease of ADLs. 3. Patient will be able to play a round of golf reporting no pain in order to return to recreational activities with improved tolerance.        PLAN  []  Upgrade activities as tolerated     [x]  Continue plan of care  []  Update interventions per flow sheet       []  Discharge due to:_  []  Other:_      Alexsander Abler, PT 5/23/2019  9:48 AM    Future Appointments   Date Time Provider Elmer Desai   8/12/2019  9:30 AM Tonia Blanco  [address]   10/9/2019  1:30 PM Obi Bolden MD [facility]

## 2019-05-29 ENCOUNTER — HOSPITAL ENCOUNTER (OUTPATIENT)
Dept: PHYSICAL THERAPY | Age: 79
Discharge: HOME | End: 2019-05-29
Payer: MEDICARE

## 2019-05-29 PROCEDURE — 97140 MANUAL THERAPY 1/> REGIONS: CPT

## 2019-05-29 PROCEDURE — 97110 THERAPEUTIC EXERCISES: CPT

## 2019-05-29 NOTE — PROGRESS NOTES
PT DAILY TREATMENT NOTE 10-18    Patient Name: Anne-Marie Matias.  Date:2019  : 1940  [x]  Patient  Verified  Payor: [place] MEDICARE / Plan: [place] MEDICARE PART A & B / Product Type: Medicare /    In time:2:30  Out time:3:10  Total Treatment Time (min): 40  Visit #: 2 of 8    Medicare/BCBS Only   Total Timed Codes (min):  40 1:1 Treatment Time:  38       Treatment Area: Cervicalgia [M54.2]    SUBJECTIVE  Pain Level (0-10 scale): 5-610  Any medication changes, allergies to medications, adverse drug reactions, diagnosis change, or new procedure performed?: [x] No    [] Yes (see summary sheet for update)  Subjective functional status/changes:   [] No changes reported  \"My neck hurts when I turn it. \"    OBJECTIVE    30 min Therapeutic Exercise:  [x] See flow sheet :   Rationale: increase ROM, increase strength and improve coordination to improve the patients ability to increase ease with ADLs      8 min Manual Therapy:  SOR. STM to right c/s paraspinals and UT/LS. C/s PAs. Rationale: decrease pain, increase ROM and increase tissue extensibility to ease ADL tolerance         With   [] TE   [] TA   [] neuro   [] other: Patient Education: [x] Review HEP    [] Progressed/Changed HEP based on:   [] positioning   [] body mechanics   [] transfers   [] heat/ice application    [] other:      Other Objective/Functional Measures:   First follow up session---cues sequencing and correct form throughout     Pain Level (0-10 scale) post treatment: 5-6/10    ASSESSMENT/Changes in Function:   Initiated POC per flowsheet. Patient puts forth good effort with exercises and reports HEP compliance. Presents with mild forward head. Pain is present only with movement.      Patient will continue to benefit from skilled PT services to modify and progress therapeutic interventions, address functional mobility deficits, address ROM deficits, address strength deficits, analyze and address soft tissue restrictions, analyze and cue movement patterns, analyze and modify body mechanics/ergonomics and assess and modify postural abnormalities to attain remaining goals. []  See Plan of Care  []  See progress note/recertification  []  See Discharge Summary         Progress towards goals / Updated goals:  Short Term Goals: To be accomplished in 2 weeks:              2. Patient will be independent with HEP 2x day in order to improve ease of ADLs.  met per patient report (5/29/2019)  [id number]. Patient will be able to decrease pain to 1/10 in order to improve activity tolerance.   Long Term Goals: To be accomplished in 4 weeks:              3. MCRXYWG will be able to improve (B) cervical rotation to 45 degrees in order to improve ease of turning head while driving.                2. Patient will be able to improve cervical flexion to 40 degrees void of pain in order to improve ease of ADLs.                3. Patient will be able to play a round of golf reporting no pain in order to return to recreational activities with improved tolerance.         PLAN  []  Upgrade activities as tolerated     [x]  Continue plan of care  []  Update interventions per flow sheet       []  Discharge due to:_  []  Other:_      Gino Brown 5/29/2019  2:35 PM    Future Appointments   Date Time Provider Elmer Desai   5/31/2019  1:30 PM [facility] PT PHYSICAL THERAPY 1 [facility]   8/12/2019  9:30 AM Keven Rodriguez NP 95 [facility]GEL[facility] SCHED   10/9/2019  1:30 PM Gogo Mcdonough MD 11 [facility]

## 2019-05-31 ENCOUNTER — HOSPITAL ENCOUNTER (OUTPATIENT)
Dept: PHYSICAL THERAPY | Age: 79
Discharge: HOME | End: 2019-05-31
Payer: MEDICARE

## 2019-05-31 PROCEDURE — 97110 THERAPEUTIC EXERCISES: CPT

## 2019-05-31 PROCEDURE — 97140 MANUAL THERAPY 1/> REGIONS: CPT

## 2019-05-31 NOTE — PROGRESS NOTES
PT DAILY TREATMENT NOTE 10-18    Patient Name: Anand Vasquez.  Date:2019  : 1940  [x]  Patient  Verified  Payor: VA MEDICARE / Plan: VA MEDICARE PART A & B / Product Type: Medicare /    In time:   Out time:  Total Treatment Time (min): 50  Visit #: 3 of 8  Medicare/BCBS Only   Total Timed Codes (min):  50 1:1 Treatment Time:  50       Treatment Area: Cervicalgia [M54.2]    SUBJECTIVE  Pain Level (0-10 scale): 5-6 with movement, 0 when static   Any medication changes, allergies to medications, adverse drug reactions, diagnosis change, or new procedure performed?: [x] No    [] Yes (see summary sheet for update)  Subjective functional status/changes:   [] No changes reported  I feel good (0/10 pain) if I don't move my neck, but when I turn my neck I have 5-6/10 pain. OBJECTIVE        42 min Therapeutic Exercise:  [x] See flow sheet :   Rationale: increase ROM and increase strength to improve the patients ability to improve pain free functional mobility to progress toward prior level of function      8 min Manual Therapy:  SOR, STM to UT/LS bilaterally, passive release for trigger point on R UT   Rationale: decrease pain, increase ROM and increase tissue extensibility to improve tolerance to functional activities               With   [] TE   [] TA   [] neuro   [] other: Patient Education: [x] Review HEP    [] Progressed/Changed HEP based on:   [] positioning   [] body mechanics   [] transfers   [] heat/ice application    [] other:      Other Objective/Functional Measures: demonstrates limited cervical rotation bilaterally during cervicothoracic rotation activity at wall but no complaints of pain. Decreased turgor in R UT after manual     Pain Level (0-10 scale) post treatment: -5/10    ASSESSMENT/Changes in Function: Demonstrated good carry over from last session, continues to to complete activities without complaints of pain or fatigue.      Patient will continue to benefit from skilled PT services to modify and progress therapeutic interventions, address functional mobility deficits, address ROM deficits, address strength deficits, analyze and address soft tissue restrictions, analyze and cue movement patterns and analyze and modify body mechanics/ergonomics to attain remaining goals. []  See Plan of Care  []  See progress note/recertification  []  See Discharge Summary         Progress towards goals / Updated goals:  Short Term Goals: To be accomplished in 2 weeks:              6. Patient will be independent with HEP 2x day in order to improve ease of ADLs.  met per patient report (5/29/2019)  [id number]. Patient will be able to decrease pain to 1/10 in order to improve activity tolerance.   Long Term Goals: To be accomplished in 4 weeks:              7. RODAAGT will be able to improve (B) cervical rotation to 45 degrees in order to improve ease of turning head while driving.                2. Patient will be able to improve cervical flexion to 40 degrees void of pain in order to improve ease of ADLs.                3. Patient will be able to play a round of golf reporting no pain in order to return to recreational activities with improved tolerance.         PLAN  [x]  Upgrade activities as tolerated     [x]  Continue plan of care  []  Update interventions per flow sheet       []  Discharge due to:_  []  Other:_      Ti Dukes, PT 5/31/2019  1:26 PM    Future Appointments   Date Time Provider Elmer Desai   5/31/2019  1:30 PM [facility] PT PHYSICAL THERAPY 1 [facility]   8/12/2019  9:30 AM Sterling Rodriguez NP [facility] SCHED   10/9/2019  1:30 PM Rayo Albrecht MD [address] [place] [address]

## 2019-06-05 ENCOUNTER — APPOINTMENT (OUTPATIENT)
Dept: PHYSICAL THERAPY | Age: 79
End: 2019-06-05
Payer: MEDICARE

## 2019-06-07 ENCOUNTER — HOSPITAL ENCOUNTER (OUTPATIENT)
Dept: PHYSICAL THERAPY | Age: 79
Discharge: HOME | End: 2019-06-07
Payer: MEDICARE

## 2019-06-07 PROCEDURE — 97140 MANUAL THERAPY 1/> REGIONS: CPT

## 2019-06-07 PROCEDURE — 97110 THERAPEUTIC EXERCISES: CPT

## 2019-06-07 NOTE — PROGRESS NOTES
PT DAILY TREATMENT NOTE 10-18    Patient Name: Meron Millan.  Date:2019  : 1940  [x]  Patient  Verified  Payor: VA MEDICARE / Plan: VA MEDICARE PART A & B / Product Type: Medicare /    In time:2:30  Out time:3:14  Total Treatment Time (min): 44  Visit #: 4 of 8    Medicare/BCBS Only   Total Timed Codes (min):  44 1:1 Treatment Time:  40       Treatment Area: Cervicalgia [M54.2]    SUBJECTIVE  Pain Level (0-10 scale): 0  Any medication changes, allergies to medications, adverse drug reactions, diagnosis change, or new procedure performed?: [x] No    [] Yes (see summary sheet for update)  Subjective functional status/changes:   [] No changes reported  I have no pain at rest, but when I rotate my neck I am still getting a pain\". OBJECTIVE      36 min Therapeutic Exercise:  [x] See flow sheet :   Rationale: increase ROM and increase strength to improve the patients ability to perform ADLs      8 min Manual Therapy:  STM to UT/LS (B), TPR right UT, c/s PA mons, right cervical downglide   Rationale: increase ROM and increase tissue extensibility to improve patients ability to perform recreational activities. With   [] TE   [] TA   [] neuro   [] other: Patient Education: [x] Review HEP    [] Progressed/Changed HEP based on:   [] positioning   [] body mechanics   [] transfers   [] heat/ice application    [] other:      Other Objective/Functional Measures: Cervical flexion - 32 degrees. Right cervical rotation = 50 degrees. Left cervical rotation 46 degrees     Pain Level (0-10 scale) post treatment: 0    ASSESSMENT/Changes in Function: Patient demonstrates great improvements in cervical AROM measured today, however patient continues to report moderate pain with end range cervical rotation. Patient states he was able to play a round of golf on Tuesday with minimal pain noted.      Patient will continue to benefit from skilled PT services to modify and progress therapeutic interventions, address functional mobility deficits, address ROM deficits, address strength deficits, analyze and address soft tissue restrictions, analyze and cue movement patterns, analyze and modify body mechanics/ergonomics and assess and modify postural abnormalities to attain remaining goals. [x]  See Plan of Care  []  See progress note/recertification  []  See Discharge Summary         Progress towards goals / Updated goals:  Short Term Goals: To be accomplished in 2 weeks:              7. Patient will be independent with HEP 2x day in order to improve ease of ADLs.  met per patient report (5/29/2019)  [id number]. Patient will be able to decrease pain to 1/10 in order to improve activity tolerance.  Not yet met 6/7/19 - patient reports 0/10 pain at rest, however 5/10 pain with end range rotation. Long Term Goals: To be accomplished in 4 weeks:              4. DLCULQA will be able to improve (B) cervical rotation to 45 degrees in order to improve ease of turning head while driving. GOAL MET - 1/6/31 -  Right cervical rotation = 50 degrees. Left cervical rotation 46 degrees                2. Patient will be able to improve cervical flexion to 40 degrees void of pain in order to improve ease of ADLs.                3. Patient will be able to play a round of golf reporting no pain in order to return to recreational activities with improved tolerance.     PLAN  []  Upgrade activities as tolerated     [x]  Continue plan of care  []  Update interventions per flow sheet       []  Discharge due to:_  []  Other:_      Phillip Hoang, PT 6/7/2019  2:34 PM    Future Appointments   Date Time Provider [facility]   8/12/2019  9:30 AM Kaiden Henson  [address]   10/9/2019  1:30 PM Keli Nichols MD 11 [facility]

## 2019-06-12 ENCOUNTER — HOSPITAL ENCOUNTER (OUTPATIENT)
Dept: PHYSICAL THERAPY | Age: 79
Discharge: HOME | End: 2019-06-12
Payer: MEDICARE

## 2019-06-12 PROCEDURE — 97140 MANUAL THERAPY 1/> REGIONS: CPT

## 2019-06-12 PROCEDURE — 97110 THERAPEUTIC EXERCISES: CPT

## 2019-06-12 NOTE — PROGRESS NOTES
PT DAILY TREATMENT NOTE 10-18    Patient Name: Anabela Sharma.  Date:2019  : 1940  [x]  Patient  Verified  Payor: VA MEDICARE / Plan: VA MEDICARE PART A & B / Product Type: Medicare /    In time:12:30  Out time:1:01  Total Treatment Time (min): 31  Visit #: 5 of 8    Medicare/BCBS Only   Total Timed Codes (min):  31 1:1 Treatment Time:  31       Treatment Area: Cervicalgia [M54.2]    SUBJECTIVE  Pain Level (0-10 scale): 0/10  Any medication changes, allergies to medications, adverse drug reactions, diagnosis change, or new procedure performed?: [x] No    [] Yes (see summary sheet for update)  Subjective functional status/changes:   [] No changes reported  Pt reports no new complaints of pain. Pt reports he occasionally gets issues in his neck    OBJECTIVE    23 min Therapeutic Exercise:  [x] See flow sheet :   Rationale: increase ROM and increase strength to improve the patients ability to tolerate ADLs. 8 min Manual Therapy:  STM to bilateral UT/LS, C/S PAs   Rationale: decrease pain, increase ROM and increase tissue extensibility to improve functional mobility with ADLs. With   [] TE   [] TA   [] neuro   [] other: Patient Education: [x] Review HEP    [] Progressed/Changed HEP based on:   [] positioning   [] body mechanics   [] transfers   [] heat/ice application    [] other:      Other Objective/Functional Measures: Decreased restrictions noted through cervical musculature. Pain Level (0-10 scale) post treatment: 0/10    ASSESSMENT/Changes in Function: Pt demonstrates good form with all exercises, improved cervical rotation noted with A/AAROM exercises.      Patient will continue to benefit from skilled PT services to modify and progress therapeutic interventions, address functional mobility deficits, address ROM deficits, address strength deficits, analyze and address soft tissue restrictions, analyze and cue movement patterns and analyze and modify body mechanics/ergonomics to attain remaining goals. []  See Plan of Care  []  See progress note/recertification  []  See Discharge Summary         Progress towards goals / Updated goals:  Short Term Goals: To be accomplished in 2 weeks:              0. Patient will be independent with HEP 2x day in order to improve ease of ADLs.  met per patient report (5/29/2019)  [id number]. Patient will be able to decrease pain to 1/10 in order to improve activity tolerance.  Not yet met 6/7/19 - patient reports 0/10 pain at rest, however 5/10 pain with end range rotation. Long Term Goals: To be accomplished in 4 weeks:              7. MAVPLNS will be able to improve (B) cervical rotation to 45 degrees in order to improve ease of turning head while driving. GOAL MET - 0/0/27 -  Right cervical rotation = 50 degrees. Left cervical rotation 46 degrees                           2. Patient will be able to improve cervical flexion to 40 degrees void of pain in order to improve ease of ADLs.                3. Patient will be able to play a round of golf reporting no pain in order to return to recreational activities with improved tolerance.     PLAN  []  Upgrade activities as tolerated     [x]  Continue plan of care  []  Update interventions per flow sheet       []  Discharge due to:_  []  Other:_      Braydon Yu PTA 6/12/2019  12:48 PM    Future Appointments   Date Time Provider Elmer Desai   6/14/2019  1:00 [facility]   6/17/2019  2:30 PM Ce Baker PTA [facility]   6/20/2019  2:30 PM Ce Baker PTA [facility]   8/12/2019  9:30 AM Janeth Berry, GEORGE [address]   10/9/2019  1:30 PM Yahaira Stroud MD 11 [facility]

## 2019-06-13 RX ORDER — LOSARTAN POTASSIUM AND HYDROCHLOROTHIAZIDE 12.5; 1 MG/1; MG/1
1 TABLET ORAL DAILY
Qty: 90 TAB | Refills: 3 | Status: SHIPPED | OUTPATIENT
Start: 2019-06-13 | End: 2020-12-31 | Stop reason: ALTCHOICE

## 2019-06-14 ENCOUNTER — HOSPITAL ENCOUNTER (OUTPATIENT)
Dept: PHYSICAL THERAPY | Age: 79
Discharge: HOME | End: 2019-06-14
Payer: MEDICARE

## 2019-06-14 PROCEDURE — 97140 MANUAL THERAPY 1/> REGIONS: CPT

## 2019-06-14 PROCEDURE — 97110 THERAPEUTIC EXERCISES: CPT

## 2019-06-14 NOTE — PROGRESS NOTES
PT DAILY TREATMENT NOTE 10-18    Patient Name: Andrea Bailey.  Date:2019  : 1940  [x]  Patient  Verified  Payor: VA MEDICARE / Plan: VA MEDICARE PART A & B / Product Type: Medicare /    In time:1:00  Out time:1:35  Total Treatment Time (min): 35  Visit #: 6 of 8    Medicare/BCBS Only   Total Timed Codes (min):  35 1:1 Treatment Time:  30       Treatment Area: Cervicalgia [M54.2]    SUBJECTIVE  Pain Level (0-10 scale): 5-6  Any medication changes, allergies to medications, adverse drug reactions, diagnosis change, or new procedure performed?: [x] No    [] Yes (see summary sheet for update)  Subjective functional status/changes:   [] No changes reported  \"My ROM is improving great, but it still hurts\". OBJECTIVE      27 min Therapeutic Exercise:  [x] See flow sheet :   Rationale: increase ROM and increase strength to improve the patients ability to perform ADLs. 8 min Manual Therapy:  STM to bilateral UT/LS, C/S PAs   Rationale: increase ROM and increase tissue extensibility to improve patients functional mobility. With   [] TE   [] TA   [] neuro   [] other: Patient Education: [x] Review HEP    [] Progressed/Changed HEP based on:   [] positioning   [] body mechanics   [] transfers   [] heat/ice application    [] other:      Other Objective/Functional Measures: Patient reports playing a round of golf with intermittent pain averaging 2/10. Pain Level (0-10 scale) post treatment: 0    ASSESSMENT/Changes in Function: Patient reports no cervical pain except with end range rotation. Patient states he has been golfing every Tuesday since [facility] and reports minimal pain. Plan to continue progressing cervical ROM and possible DN in order to improve patients functional mobility.      Patient will continue to benefit from skilled PT services to modify and progress therapeutic interventions, address functional mobility deficits, address ROM deficits, address strength deficits, analyze and address soft tissue restrictions, analyze and cue movement patterns, analyze and modify body mechanics/ergonomics and assess and modify postural abnormalities to attain remaining goals. [x]  See Plan of Care  []  See progress note/recertification  []  See Discharge Summary         Progress towards goals / Updated goals:  Short Term Goals: To be accomplished in 2 weeks:              2. Patient will be independent with HEP 2x day in order to improve ease of ADLs.  met per patient report (5/29/2019)  [id number]. Patient will be able to decrease pain to 1/10 in order to improve activity tolerance.  Not yet met 6/7/19 - patient reports 0/10 pain at rest, however 5/10 pain with end range rotation.   Long Term Goals: To be accomplished in 4 weeks:              5. WNQUONJ will be able to improve (B) cervical rotation to 45 degrees in order to improve ease of turning head while driving. GOAL MET - 5/5/12 -  Right cervical rotation = 50 degrees. Left cervical rotation 46 degrees                           2. Patient will be able to improve cervical flexion to 40 degrees void of pain in order to improve ease of ADLs.                3. Patient will be able to play a round of golf reporting no pain in order to return to recreational activities with improved tolerance. Progressing 6/14/19 - patient reports playing a round of golf with intermittent pain averaging 2/10.      PLAN  []  Upgrade activities as tolerated     [x]  Continue plan of care  []  Update interventions per flow sheet       []  Discharge due to:_  []  Other:_      Maite Murrieta, PT 6/14/2019  1:02 PM    Future Appointments   Date Time Provider Elmer Desai   6/17/2019  2:30 PM [facility]   6/20/2019  2:30 PM Vibha Beckford PTA [facility]   8/12/2019  9:30 AM Joanne Chatman NP 95 [facility]   10/9/2019  1:30 PM Josué Torres MD 11 [facility]

## 2019-06-17 ENCOUNTER — HOSPITAL ENCOUNTER (OUTPATIENT)
Dept: PHYSICAL THERAPY | Age: 79
Discharge: HOME | End: 2019-06-17
Payer: MEDICARE

## 2019-06-17 PROCEDURE — 97140 MANUAL THERAPY 1/> REGIONS: CPT

## 2019-06-17 PROCEDURE — 97110 THERAPEUTIC EXERCISES: CPT

## 2019-06-17 NOTE — PROGRESS NOTES
PT DAILY TREATMENT NOTE 10-18    Patient Name: Ami Bennett.  Date:2019  : 1940  [x]  Patient  Verified  Payor: VA MEDICARE / Plan: VA MEDICARE PART A & B / Product Type: Medicare /    In time:2:30  Out time:3:09  Total Treatment Time (min): 39  Visit #: 7 of 8    Medicare/BCBS Only   Total Timed Codes (min):  39 1:1 Treatment Time:  30       Treatment Area: Cervicalgia [M54.2]    SUBJECTIVE  Pain Level (0-10 scale): 4-5/10  Any medication changes, allergies to medications, adverse drug reactions, diagnosis change, or new procedure performed?: [x] No    [] Yes (see summary sheet for update)  Subjective functional status/changes:   [] No changes reported  Pt reports continued decrease in neck pain. OBJECTIVE    31 min Therapeutic Exercise:  [] See flow sheet :   Rationale: increase ROM and increase strength to improve the patients ability to tolerate ADLs. 8 min Manual Therapy:  DTM to cervical paraspinals and upper traps. C/S mobs   Rationale: decrease pain, increase ROM and increase tissue extensibility to improve functional mobility. With   [] TE   [] TA   [] neuro   [] other: Patient Education: [x] Review HEP    [] Progressed/Changed HEP based on:   [] positioning   [] body mechanics   [] transfers   [] heat/ice application    [] other:      Other Objective/Functional Measures: decreased trigger points through c/s and bilateral UT. Pain Level (0-10 scale) post treatment: 0/10    ASSESSMENT/Changes in Function: Pt continues to demonstrate decreased muscular restrictions through cervical spine. Pt continues to demonstrate improved cervical mobility with cervical rotation.      Patient will continue to benefit from skilled PT services to modify and progress therapeutic interventions, address functional mobility deficits, address ROM deficits, address strength deficits, analyze and address soft tissue restrictions, analyze and cue movement patterns and analyze and modify body mechanics/ergonomics to attain remaining goals. []  See Plan of Care  []  See progress note/recertification  []  See Discharge Summary         Progress towards goals / Updated goals:  Short Term Goals: To be accomplished in 2 weeks:              3. Patient will be independent with HEP 2x day in order to improve ease of ADLs.  met per patient report (5/29/2019)  [id number]. Patient will be able to decrease pain to 1/10 in order to improve activity tolerance.  Not yet met 6/7/19 - patient reports 0/10 pain at rest, however 5/10 pain with end range rotation.   Long Term Goals: To be accomplished in 4 weeks:              6. KIMBERLEYA will be able to improve (B) cervical rotation to 45 degrees in order to improve ease of turning head while driving. GOAL MET - 9/4/32 -  Right cervical rotation = 50 degrees. Left cervical rotation 46 degrees                           2. Patient will be able to improve cervical flexion to 40 degrees void of pain in order to improve ease of ADLs.                3. Patient will be able to play a round of golf reporting no pain in order to return to recreational activities with improved tolerance. Progressing 6/14/19 - patient reports playing a round of golf with intermittent pain averaging 2/10.      PLAN  []  Upgrade activities as tolerated     [x]  Continue plan of care  []  Update interventions per flow sheet       []  Discharge due to:_  []  Other:_      Kevyn Looney PTA 6/17/2019  2:45 PM    Future Appointments   Date Time Provider Elmer Desai   6/20/2019  2:30 PM [facility]   6/24/2019  3:00 PM [place], [address] [facility]   6/26/2019  2:30 PM Tor Annona, [facility]   7/1/2019 12:00 PM Tor Annona, [facility]   7/3/2019 12:00 PM [place], [address] [facility]   7/8/2019 11:30 AM [place], [address] [facility]   7/10/2019  2:00 PM Tor Annona, [facility]   8/12/2019  9:30 AM [facility]   10/9/2019  1:30 PM Josué Torres MD [address] [place] [address]

## 2019-06-20 ENCOUNTER — HOSPITAL ENCOUNTER (OUTPATIENT)
Dept: PHYSICAL THERAPY | Age: 79
Discharge: HOME | End: 2019-06-20
Payer: MEDICARE

## 2019-06-20 PROCEDURE — 97110 THERAPEUTIC EXERCISES: CPT

## 2019-06-20 PROCEDURE — 97140 MANUAL THERAPY 1/> REGIONS: CPT

## 2019-06-20 NOTE — PROGRESS NOTES
[facility]  [address]  [place], [address]  [phone] fax    Continued Plan of Care/ Re-certification for Physical Therapy Services    Patient name: Maribell Mclaughlin Start of Care: 2019   Referral source: Gagan Alvarenga NP : 1940               Medical Diagnosis: Cervicalgia [M54.2]  Payor: Stephanie Loomis / Plan: [place] MEDICARE PART A & B / Product Type: Medicare /  Onset Date: 5 months ago               Treatment Diagnosis: Cervical pain    Prior Hospitalization: see medical history Provider#: [id number]   Medications: Verified on Patient summary List    Comorbidities: arthritis, back pain, cancer, HBP, Afib   Prior Level of Function: (I) with all ADLs and participates in golf every Tuesday. Visits from Start of Care: 8    Missed Visits: 0    The Plan of Care and following information is based on the patient's current status:    Key functional changes: Great improvements in cervical AROM and postural awareness, however continues to have increased pain with end range rotation. Short Term Goals: To be accomplished in 2 weeks:              7. Patient will be independent with HEP 2x day in order to improve ease of ADLs.  met per patient report (2019)  [id number]. Patient will be able to decrease pain to 1/10 in order to improve activity tolerance.  Not yet met 19 - patient reports 0/10 pain at rest, however 5/10 pain with end range rotation.   Long Term Goals: To be accomplished in 4 weeks:              9. QNAUJNA will be able to improve (B) cervical rotation to 45 degrees in order to improve ease of turning head while driving. GOAL MET - 05 -  Right cervical rotation = 50 degrees. Left cervical rotation 46 degrees                           2. Patient will be able to improve cervical flexion to 40 degrees void of pain in order to improve ease of ADLs.  -progressed to 35 degrees.  19              3. Patient will be able to play a round of golf reporting no pain in order to return to recreational activities with improved tolerance. Progressing 6/14/19 - patient reports playing a round of golf with intermittent pain averaging 2/10.        Problems/ barriers to goal attainment: HEP compliance     Problem List: pain affecting function, decrease ROM, decrease strength, decrease ADL/ functional abilitiies, decrease activity tolerance and decrease flexibility/ joint mobility    Treatment Plan: Therapeutic exercise, Neuromuscular re-education, Physical agent/modality, Manual therapy, Patient education and Functional mobility training     Patient Goal (s) has been updated and includes: Improver cervical AROM, improve ease of ADLs and recreational activities, decrease pain    Goals for this certification period to be accomplished in 3 weeks:  1. Patient will be able to improve FOTO score to 71 in order to demonstrate improvements in functional independence. 2. Patient will be able to improve cervical flexion to 40 degrees void of pain in order to improve ease of ADLs.  -progressed to 35 degrees. 6/20/19  3. Patient will be able to play a round of golf reporting no pain in order to return to recreational activities with improved tolerance. Progressing 6/14/19 - patient reports playing a round of golf with intermittent pain averaging 2/10.       Frequency / Duration: Patient to be seen 2 times per week for 3 weeks:    Assessment / Recommendations:Pt continues to demonstrate improved cervical mobility and improved posture but continues to c/o pain with rotation. Pt has decreased symptoms post treatment. Plan to continue progressing strengthening and mobility exercises with dry needling trial 2x/week for 3 additional weeks in order to improve patients functional independence.        Certification Period: 6/20/19 - 7/20/19    Agapito Hess PT 6/20/2019 3:38 PM    ________________________________________________________________________  I certify that the above Therapy Services are being furnished while the patient is under my care. I agree with the treatment plan and certify that this therapy is necessary. [] I have read the above and request that my patient continue as recommended.   [] I have read the above report and request that my patient continue therapy with the following changes/special instructions: _____________________________________________  [] I have read the above report and request that my patient be discharged from therapy    Physician's Signature:____________Date:_________TIME:________    ** Signature, Date and Time must be completed for valid certification **    Please sign and return to [facility] [address] [address] [address]  [place], [facility] [address]  [phone] fax

## 2019-06-20 NOTE — PROGRESS NOTES
PT DAILY TREATMENT NOTE 10-18    Patient Name: Kurtis Puente.  Date:2019  : 1940  [x]  Patient  Verified  Payor: VA MEDICARE / Plan: VA MEDICARE PART A & B / Product Type: Medicare /    In time:2:30  Out time:3:15  Total Treatment Time (min): 45  Visit #: 8 of 8    Medicare/BCBS Only   Total Timed Codes (min):  45 1:1 Treatment Time:  23       Treatment Area: Cervicalgia [M54.2]    SUBJECTIVE  Pain Level (0-10 scale): 5/10  Any medication changes, allergies to medications, adverse drug reactions, diagnosis change, or new procedure performed?: [x] No    [] Yes (see summary sheet for update)  Subjective functional status/changes:   [] No changes reported  Pt reports pain when turning his head at times. Pt reports continued compliance with HEP. OBJECTIVE    37 min Therapeutic Exercise:  [x] See flow sheet :   Rationale: increase ROM and increase strength to improve the patients ability to tolerate ADLs. 8 min Manual Therapy:  DTM to C/S Paraspinals and bilateral UT, C/S PA mobs, thoracic traction   Rationale: decrease pain, increase ROM and increase tissue extensibility to tolerate ADLs. With   [] TE   [] TA   [] neuro   [] other: Patient Education: [x] Review HEP    [] Progressed/Changed HEP based on:   [] positioning   [] body mechanics   [] transfers   [] heat/ice application    [] other:      Other Objective/Functional Measures: C/S AROM [id number] degrees. Pain Level (0-10 scale) post treatment: 0/10    ASSESSMENT/Changes in Function: Pt continues to demonstrate improved cervical mobility and improved posture but continues to c/o pain with rotation. Pt has decreased symptoms post treatment.      Patient will continue to benefit from skilled PT services to modify and progress therapeutic interventions, address functional mobility deficits, address ROM deficits, address strength deficits, analyze and address soft tissue restrictions and analyze and cue movement patterns to attain remaining goals. []  See Plan of Care  []  See progress note/recertification  []  See Discharge Summary         Progress towards goals / Updated goals:  Short Term Goals: To be accomplished in 2 weeks:              8. Patient will be independent with HEP 2x day in order to improve ease of ADLs.  met per patient report (5/29/2019)  [id number]. Patient will be able to decrease pain to 1/10 in order to improve activity tolerance.  Not yet met 6/7/19 - patient reports 0/10 pain at rest, however 5/10 pain with end range rotation.   Long Term Goals: To be accomplished in 4 weeks:              6. BLEQWNP will be able to improve (B) cervical rotation to 45 degrees in order to improve ease of turning head while driving. GOAL MET - 1/9/88 -  Right cervical rotation = 50 degrees. Left cervical rotation 46 degrees                           2. Patient will be able to improve cervical flexion to 40 degrees void of pain in order to improve ease of ADLs.  -progressed to 35 degrees. 6/20/19              3. Patient will be able to play a round of golf reporting no pain in order to return to recreational activities with improved tolerance.  Progressing 6/14/19 - patient reports playing a round of golf with intermittent pain averaging 2/10.         PLAN  []  Upgrade activities as tolerated     [x]  Continue plan of care  []  Update interventions per flow sheet       []  Discharge due to:_  []  Other:_      Mike Vasquez PTA 6/20/2019  3:24 PM    Future Appointments   Date Time Provider Elmer Hernandezi   6/24/2019  3:00 PM Ye, [address] [facility]   6/26/2019  2:30 PM Donald Agrawal PTA [facility]   7/1/2019 12:00 PM [facility]   7/3/2019 12:00 PM [place], [address] [facility]   7/8/2019 11:30 AM Ye, [address] [facility]   7/10/2019  2:00 PM [facility]   8/12/2019  9:30 AM Atul Montoya  [address]   10/9/2019  1:30 PM Gualberto Lowe MD [facility] [address]

## 2019-06-24 ENCOUNTER — HOSPITAL ENCOUNTER (OUTPATIENT)
Dept: PHYSICAL THERAPY | Age: 79
Discharge: HOME | End: 2019-06-24
Payer: MEDICARE

## 2019-06-24 PROCEDURE — 97140 MANUAL THERAPY 1/> REGIONS: CPT

## 2019-06-24 PROCEDURE — 97110 THERAPEUTIC EXERCISES: CPT

## 2019-06-24 NOTE — PROGRESS NOTES
PT DAILY TREATMENT NOTE 10-18    Patient Name: Arnol Ramsey.  Date:2019  : 1940  [x]  Patient  Verified  Payor: VA MEDICARE / Plan: VA MEDICARE PART A & B / Product Type: Medicare /    In time:2:53  Out time:3:35  Total Treatment Time (min): 42  Visit #: 1 of 6    Medicare/BCBS Only   Total Timed Codes (min):  42 1:1 Treatment Time:  42       Treatment Area: Cervicalgia [M54.2]    SUBJECTIVE  Pain Level (0-10 scale): 0  Any medication changes, allergies to medications, adverse drug reactions, diagnosis change, or new procedure performed?: [x] No    [] Yes (see summary sheet for update)  Subjective functional status/changes:   [] No changes reported  Pt reports that he still has some soreness/tension with end range rotation     OBJECTIVE    Modality rationale: PD to improve the patients ability to    Min Type Additional Details    [] Estim:  []Unatt       []IFC  []Premod                        []Other:  []w/ice   []w/heat  Position:  Location:    [] Estim: []Att    []TENS instruct  []NMES                    []Other:  []w/US   []w/ice   []w/heat  Position:  Location:    []  Traction: [] Cervical       []Lumbar                       [] Prone          []Supine                       []Intermittent   []Continuous Lbs:  [] before manual  [] after manual    []  Ultrasound: []Continuous   [] Pulsed                           []1MHz   []3MHz W/cm2:  Location:    []  Iontophoresis with dexamethasone         Location: [] Take home patch   [] In clinic    []  Ice     []  heat  []  Ice massage  []  Laser   []  Anodyne Position:  Location:    []  Laser with stim  []  Other:  Position:  Location:    []  Vasopneumatic Device Pressure:       [] lo [] med [] hi   Temperature: [] lo [] med [] hi   [] Skin assessment post-treatment:  []intact []redness- no adverse reaction    []redness  adverse reaction:       34 min Therapeutic Exercise:  [] See flow sheet : including DN education and post needling care instruct   Rationale: increase ROM and increase strength to improve the patients ability to perform daily tasks and self care    8 min Manual Therapy:  DN palpation and setup   Rationale: increase ROM, increase tissue extensibility and decrease trigger points to improve ease of ADL performance    Dry Needling Procedure Note    Procedure: An intramuscular manual therapy (dry needling) and a neuro-muscular re-education treatment was done to deactivate myofascial trigger points with a 30 gauge filament needle under aseptic technique. Indications:  [x] Myofascial pain and dysfunction [] Muscled spasms  [x] Myalgia/myositis   [] Muscle cramps  [x] Muscle imbalances  [] Temporomandibular Dysfunction  [] Other:    Chart reviewed for the following:  Umm MACEDO, have reviewed the medical history, summary list and precautions/contraindications for [employer]. Angus Odom   TIME OUT performed immediately prior to start of procedure:  Umm MACEDO, have performed the following reviews on [employer]. prior to the start of the session:      [x] Verified patient identification by name and date of birth    [x] Agreement on all muscles being treated was verified   [x] Purpose of dry needling, side effects, possible complications, risks and benefits were explained to the patient   [x] Procedure site(s) verified  [x] Patient was positioned for comfort and draped for privacy  [x] Informed Consent was signed (initial visit) and verified verbally (subsequent visits)  [x] Patient was instructed on the need to report the use of blood thinners and/or immunosuppressant medications  [x] How to respond to possible adverse effects of treatment  [x] Self treatment of post needling soreness: ice, heat (moist heat, heat wraps) and stretching  [x] Opportunity was given to ask any questions, all questions were answered            Time: 3:03  Date of procedure: 6/24/2019  Treatment: The following muscles were treated today with intramuscular dry needling  [] Left [] Right Masster  [] Left [] Right Temporalis  [] Left [] Right Zygomaticus Major / Minor  [] Left [] Right Lateral Pterygoid  [] Left [] Right Medial Pterygoid  [] Left [] Right Digastric Post / Anterior Belly  [] Left [] Right Sternocleidomastoid  [] Left [] Right Scalene Anterior / Medial / Posterior  [] Left [] Right Extra Laryngeal Muscles  [] Left [] Right Upper Trapezius  [] Left [] Right Middle Trapezius  [] Left [] Right Lower Trapezius  [] Left [] Right Oblique Capitis Inferior  [x] Left [x] Right Splenius Capitis / Cervicis  [x] Left [x] Right Semispinalis: Capitis / Cervicis  [x] Left [x] Right Multifidi / Rotatores Cervicis / Thoracic  [] Left [] Right Longissimus Thoracis / Illiocostalis  [] Left [] Right Levator Scapulae  [] Left [] Right Supraspinatus / Infraspinatus  [] Left [] Right Teres Major / Minor  [] Left [] Right Rhomboids / Serratus posterior superior  [] Left [] Right Pectoralis Major / Minor  [] Left [] Right Serratus Anterior  [] Left [] Right Latissimus Dorsi  [] Left [] Right Subscapularis  [] Left [] Right Coracobrachialis  [] Left [] Right Biceps Brachii  [] Left [] Right Deltoid: Anterior / Medial / Posterior  [] Left [] Right Brachialis  [] Left [] Right Triceps  [] Left [] Right Brachioradialis  [] Left [] Right Extensor Carpi Radialis Brevis / Extensor Carpi Radialis Longus    [] Left [] Right  Extensor digitorum  [] Left [] Right Supinator / Pronator Teres  [] Left [] Right Flexor Carpi Radialis/ Flexor Carpi Ulnaris   [] Left [] Right  Flexor Digitorum Superficialis/ Flexor Digitorum Profundus  [] Left [] Right Flexor Pollicis Longus / Flexor Pollicis Brevis / Palmaris Longus  [] Left [] Right Abductor Pollicis Longus / Abductor Pollicis Brevis  [] Left [] Right Opponens Pollicis / Adductor Pollicis  [] Left [] Right Dorsal / Palmar Interossei / Lumbricalis  [] Left [] Right Abductor Digiti Minimi / Opponens Digiti Minimi    Patient's response to today's treatment:  [x] Latent Twitch Response     [] Muscle relaxation [] Pain Relief  [] Post needling soreness    [x] without complications  [] Increased Range of Motion   [] Decreased headaches    [] Decreased Tinnitus  [] Other:     Performed by: Tai James, ANUJ, ADALPT          With   [] TE   [] TA   [] neuro   [] other: Patient Education: [x] Review HEP    [] Progressed/Changed HEP based on:   [] positioning   [] body mechanics   [] transfers   [] heat/ice application    [] other:      Other Objective/Functional Measures: pt brings to PT's attention \"knot\" on right side of cervical spine, appears that it is possibly muscular in nature, seems a bit to far lateral for DN purposes     Pain Level (0-10 scale) post treatment: 0    ASSESSMENT/Changes in Function: Pt with good tolerance to initiation of DN today. He denies any increased soreness with ROM post needling. Pt was educated on post needling care and to look for signs of improved flexibility or decreased pain/tension. Patient will continue to benefit from skilled PT services to modify and progress therapeutic interventions, address functional mobility deficits, address ROM deficits, address strength deficits, analyze and address soft tissue restrictions, analyze and cue movement patterns and analyze and modify body mechanics/ergonomics to attain remaining goals. []  See Plan of Care  []  See progress note/recertification  []  See Discharge Summary         Goals for this certification period to be accomplished in 3 weeks:  1. Patient will be able to improve FOTO score to 71 in order to demonstrate improvements in functional independence. 2. Patient will be able to improve cervical flexion to 40 degrees void of pain in order to improve ease of ADLs.  -progressed to 35 degrees. 6/20/19  3. Patient will be able to play a round of golf reporting no pain in order to return to recreational activities with improved tolerance.  Progressing 6/14/19 - patient reports playing a round of golf with intermittent pain averaging 2/10.     PLAN  []  Upgrade activities as tolerated     []  Continue plan of care  []  Update interventions per flow sheet       []  Discharge due to:_  []  Other:_      Isabelladaryl Amezcua DPT, CMTPT 6/24/2019  3:01 PM    Future Appointments   Date Time Provider Elmer Giselle   6/26/2019  2:30 PM Kiet Delacruz PTA [facility]   7/1/2019 12:00 PM [facility]   7/3/2019 12:00 PM Ye, [address] [address] [facility]   7/8/2019 11:30 AM [place], [employer] [facility]   7/10/2019  2:00 PM [facility]   8/12/2019  9:30 AM GEORGE LanierMO ANGELA [facility]   10/9/2019  1:30 PM Keli Nichols MD [address] [place] [address]

## 2019-06-26 ENCOUNTER — HOSPITAL ENCOUNTER (OUTPATIENT)
Dept: PHYSICAL THERAPY | Age: 79
Discharge: HOME | End: 2019-06-26
Payer: MEDICARE

## 2019-06-26 PROCEDURE — 97110 THERAPEUTIC EXERCISES: CPT

## 2019-06-26 PROCEDURE — 97140 MANUAL THERAPY 1/> REGIONS: CPT

## 2019-06-26 NOTE — PROGRESS NOTES
PT DAILY TREATMENT NOTE 10-18    Patient Name: Mal Feliciano.  Date:2019  : 1940  [x]  Patient  Verified  Payor: VA MEDICARE / Plan: VA MEDICARE PART A & B / Product Type: Medicare /    In time:2:30  Out time:3:08  Total Treatment Time (min):38   Visit #: 2 of 6    Medicare/BCBS Only   Total Timed Codes (min):  38 1:1 Treatment Time:  38       Treatment Area: Cervicalgia [M54.2]    SUBJECTIVE  Pain Level (0-10 scale): 0  Any medication changes, allergies to medications, adverse drug reactions, diagnosis change, or new procedure performed?: [x] No    [] Yes (see summary sheet for update)  Subjective functional status/changes:   [] No changes reported      OBJECTIVE    30 min Therapeutic Exercise:  [x] See flow sheet :   Rationale: increase ROM and increase strength to improve the patients ability to perform daily tasks and self care      8 min Manual Therapy:  SOR, DTM to C/S Paraspinals and bilateral UT, thoracic PAs   Rationale: decrease pain, increase ROM and increase tissue extensibility to tolerate ADLs. With   [] TE   [] TA   [] neuro   [] other: Patient Education: [x] Review HEP    [] Progressed/Changed HEP based on:   [] positioning   [] body mechanics   [] transfers   [] heat/ice application    [] other:      Other Objective/Functional Measures:  No changes to therex  Pain Level (0-10 scale) post treatment: 0    ASSESSMENT/Changes in Function:  Pt without c/o pain and no significant tenderness to palpation noted. He appears to be progressing with PT. Patient will continue to benefit from skilled PT services to modify and progress therapeutic interventions, address functional mobility deficits, address ROM deficits, address strength deficits, analyze and address soft tissue restrictions, analyze and cue movement patterns and analyze and modify body mechanics/ergonomics to attain remaining goals.      []  See Plan of Care  []  See progress note/recertification  []  See Discharge Summary         Goals for this certification period to be accomplished in 3 weeks:  1. Patient will be able to improve FOTO score to 71 in order to demonstrate improvements in functional independence. 2. Patient will be able to improve cervical flexion to 40 degrees void of pain in order to improve ease of ADLs.  -progressed to 35 degrees. 6/20/19  3. Patient will be able to play a round of golf reporting no pain in order to return to recreational activities with improved tolerance.  Progressing 6/14/19 - patient reports playing a round of golf with intermittent pain averaging 2/10.     PLAN  []  Upgrade activities as tolerated     [x]  Continue plan of care  []  Update interventions per flow sheet       []  Discharge due to:_  []  Other:_      Estevan Segovia, PT  6/26/2019  2:31 PM    Future Appointments   Date Time Provider Elmer Hernandezi   7/1/2019 12:00 PM [facility]   7/3/2019 12:00 PM Ye, [email or web address] [facility]   7/8/2019 11:[facility]   7/10/2019  2:00 PM Fritz Felty, [facility]   8/12/2019  9:30 AM Travis Kovacs NP [address] [facility]   10/9/2019  1:30 PM Laura López MD 11 [facility]

## 2019-07-01 ENCOUNTER — HOSPITAL ENCOUNTER (OUTPATIENT)
Dept: PHYSICAL THERAPY | Age: 79
Discharge: HOME | End: 2019-07-01
Payer: MEDICARE

## 2019-07-01 PROCEDURE — 97140 MANUAL THERAPY 1/> REGIONS: CPT

## 2019-07-01 PROCEDURE — 97110 THERAPEUTIC EXERCISES: CPT

## 2019-07-01 NOTE — PROGRESS NOTES
PT DAILY TREATMENT NOTE 10-18    Patient Name: Amber Diaz.  Date:2019  : 1940  [x]  Patient  Verified  Payor: [place] MEDICARE / Plan: [place] MEDICARE PART A & B / Product Type: Medicare /    In time:12:00  Out time:12:38  Total Treatment Time (min): 38  Visit #: 3 of 6    Medicare/BCBS Only   Total Timed Codes (min):  38 1:1 Treatment Time:  30       Treatment Area: Cervicalgia [M54.2]    SUBJECTIVE  Pain Level (0-10 scale): 5/10  Any medication changes, allergies to medications, adverse drug reactions, diagnosis change, or new procedure performed?: [x] No    [] Yes (see summary sheet for update)  Subjective functional status/changes:   [] No changes reported  Pt reports no new complaints of pain. OBJECTIVE    30 min Therapeutic Exercise:  [x] See flow sheet :   Rationale: increase ROM and increase strength to improve the patients ability to tolerate ADLs. 8 min Manual Therapy:  T/S traction, C/S  And T/S  PA Mobs, DTM to bilateral UT and c/s paraspinals    Rationale: decrease pain, increase ROM and increase tissue extensibility to improve functional mobility. With   [] TE   [] TA   [] neuro   [] other: Patient Education: [x] Review HEP    [] Progressed/Changed HEP based on:   [] positioning   [] body mechanics   [] transfers   [] heat/ice application    [] other:      Other Objective/Functional Measures: improved cervical rotation AROM bilaterally. Pain Level (0-10 scale) post treatment: 0/10    ASSESSMENT/Changes in Function: Pt continues to demonstrate improved cervical mobility and posture. Patient will continue to benefit from skilled PT services to modify and progress therapeutic interventions, address functional mobility deficits, address ROM deficits, address strength deficits, analyze and address soft tissue restrictions and analyze and cue movement patterns to attain remaining goals.      []  See Plan of Care  []  See progress note/recertification  []  See Discharge Summary         Progress towards goals / Updated goals:  Goals for this certification period to be accomplished in 3 weeks:  1. Patient will be able to improve FOTO score to 71 in order to demonstrate improvements in functional independence.   2. Patient will be able to improve cervical flexion to 40 degrees void of pain in order to improve ease of ADLs.  -progressed to 35 degrees. 6/20/19  3. Patient will be able to play a round of golf reporting no pain in order to return to recreational activities with improved tolerance.  Progressing 6/14/19 - patient reports playing a round of golf with intermittent pain averaging 2/10.     PLAN  []  Upgrade activities as tolerated     [x]  Continue plan of care  []  Update interventions per flow sheet       []  Discharge due to:_  []  Other:_      Chika Wheeler PTA 7/1/2019  12:22 PM    Future Appointments   Date Time Provider Elmer Hernandezi   7/3/2019 12:00 PM Ye, [facility]   7/8/2019 11:30 AM Radnor, [facility]   7/10/2019  2:00 PM Corrine Munoz PTA [facility]   8/12/2019  9:30 AM GEORGE Buenrostro [facility]   10/9/2019  1:30 PM Julieta Enriquez MD [facility]

## 2019-07-03 ENCOUNTER — APPOINTMENT (OUTPATIENT)
Dept: PHYSICAL THERAPY | Age: 79
End: 2019-07-03
Payer: MEDICARE

## 2019-07-08 ENCOUNTER — APPOINTMENT (OUTPATIENT)
Dept: PHYSICAL THERAPY | Age: 79
End: 2019-07-08
Payer: MEDICARE

## 2019-07-10 ENCOUNTER — APPOINTMENT (OUTPATIENT)
Dept: PHYSICAL THERAPY | Age: 79
End: 2019-07-10
Payer: MEDICARE

## 2019-07-29 NOTE — PROGRESS NOTES
[facility]  [address]  [place], [address]  [phone] fax    Physical Therapy Discharge Summary  Patient name: Galo Monzon. Start of Care: 2019   Referral source: Molina Rodriguez NP : 1940               Medical Diagnosis: Cervicalgia [M54.2]  Payor: Mateo Randle / Plan: [place] MEDICARE PART A & B / Product Type: Medicare /  Onset Date: 5 months ago               Treatment Diagnosis: Cervical pain    Prior Hospitalization: see medical history Provider#: [id number]   Medications: Verified on Patient summary List    Comorbidities: arthritis, back pain, cancer, HBP, Afib   Prior Level of Function: (I) with all ADLs and participates in golf every Tuesday.     Visits from Start of Care: 11    Missed Visits: 0  Reporting Period : 19 to 19      Summary of Care:  Goals for this certification period to be accomplished in 3 weeks:  1. Patient will be able to improve FOTO score to 71 in order to demonstrate improvements in functional independence.   2. Patient will be able to improve cervical flexion to 40 degrees void of pain in order to improve ease of ADLs.  -progressed to 35 degrees. 19  3. Patient will be able to play a round of golf reporting no pain in order to return to recreational activities with improved tolerance. Progressing 19 - patient reports playing a round of golf with intermittent pain averaging 2/10.       ASSESSMENT/RECOMMENDATIONS: Unable to further assess progress towards goals at this time due to patient calling to self D/C stating he is feeling much better. DC at this time with no further instructions to the patient.   Thank you for this referral.      Carla Samuels, PT 2019 7:36 AM

## 2019-08-02 DIAGNOSIS — I10 ESSENTIAL HYPERTENSION: Primary | ICD-10-CM

## 2019-08-02 NOTE — PROGRESS NOTES
This is the Subsequent Medicare Annual Wellness Exam, performed 12 months or more after the Initial AWV or the last Subsequent AWV    I have reviewed the patient's medical history in detail and updated the computerized patient record. 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    Venous insufficiency       Past Surgical History:   Procedure Laterality Date    COLONOSCOPY N/A 1/11/2019    COLONOSCOPY, SURVEILLANCE hot poplypectomy, w ENDOCLIP x 5 performed by Rosario Diaz MD at [address] Right 5/2012    large polyps ([facility])    HX COLONOSCOPY  4/27/12    HX HERNIA REPAIR Right     HX KNEE REPLACEMENT Right 1/2016    HX ORTHOPAEDIC Bilateral     hand    HX OTHER SURGICAL  2005    melanoma R arm    [place]    HX UROLOGICAL  1990    bladder CA (TCC); TURB with XRT     Current Outpatient Medications   Medication Sig Dispense Refill    atenolol (TENORMIN) 25 mg tablet Take 12.5 mg by mouth daily.  losartan-hydroCHLOROthiazide (HYZAAR) 100-12.5 mg per tablet Take 1 Tab by mouth daily. 90 Tab 3    rivaroxaban (XARELTO) 20 mg tab tablet Take  by mouth daily.  allopurinol (ZYLOPRIM) 100 mg tablet Take 1 Tab by mouth daily. 90 Tab 4    amLODIPine (NORVASC) 10 mg tablet Take 1 Tab by mouth daily. 90 Tab 3    atorvastatin (LIPITOR) 40 mg tablet 1/2 qd 45 Tab 3     Allergies   Allergen Reactions    Vicodin [Hydrocodone-Acetaminophen] Swelling     Family History   Problem Relation Age of Onset    Heart Disease Father     Cancer Sister         breast     Social History     Tobacco Use    Smoking status: Never Smoker    Smokeless tobacco: Never Used   Substance Use Topics    Alcohol use:  Yes Alcohol/week: 0.0 standard drinks     Patient Active Problem List   Diagnosis Code    Pulmonary embolus (HCC) I26.99    DVT (deep venous thrombosis) (HCC) I82.409    Atrial fibrillation (HCC) I48.91    Essential hypertension I10    Obesity E66.9    Prostate cancer ([facility] [address] C61    Mixed hyperlipidemia E78.2    Gout, unspecified M10.9    Advance directive discussed with patient Z71.89       Depression Risk Factor Screening:     3 most recent PHQ Screens 10/9/2019   Little interest or pleasure in doing things Not at all   Feeling down, depressed, irritable, or hopeless Not at all   Total Score PHQ 2 0     Alcohol Risk Factor Screening: On any occasion in the past three months you have had more than 7 drinks containing alcohol    Functional Ability and Level of Safety:   Hearing Loss  Hearing is reduced    Activities of Daily Living  The home contains: no safety equipment. Patient does total self care    Fall Risk  Fall Risk Assessment, last 12 mths 10/9/2019   Able to walk? Yes   Fall in past 12 months? No       Abuse Screen  Patient is not abused    Cognitive Screening   Evaluation of Cognitive Function:  Has your family/caregiver stated any concerns about your memory: no  Normal    Patient Care Team   Patient Care Team:  Ele Strong MD as PCP - General (Internal Medicine)  Alisson Escobar MD (Vascular Surgery)    Assessment/Plan   Education and counseling provided:  Are appropriate based on today's review and evaluation    Diagnoses and all orders for this visit:    1. Routine general medical examination at a health care facility    2. Essential hypertension    3. Paroxysmal atrial fibrillation (HCC)    4.  Mixed hyperlipidemia        Health Maintenance Due   Topic Date Due    DTaP/Tdap/Td series (1 - Tdap) 04/03/1961    Shingrix Vaccine Age 50> (1 of 2) 04/03/1990    GLAUCOMA SCREENING Q2Y  04/03/2005    Influenza Age 9 to Adult  08/01/2019    MEDICARE YEARLY EXAM  10/12/2019   Medicare wellness performed  Labs soon to assess metabolic parameters  Continue dietary/activity efforts  [place] done 1/2019 (polyp)  Vaccines: flu vaccine advised  Advance directive discussed    PROGRESS NOTE    HPI:   F/u visit for routine evaluation of HTN, PAF, hyperlipidemia (chol 149 April 2019)  No new issues  w/o chest pain/abd. discomfort; no dyspnea, cough or increased pedal edema; denies constitutional complaints of fever, night sweats or wt loss; no evidence of GI/ hemorrhage    ROS is otherwise negative. 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    Venous insufficiency        Past Surgical History:   Procedure Laterality Date    COLONOSCOPY N/A 1/11/2019    COLONOSCOPY, SURVEILLANCE hot poplypectomy, w ENDOCLIP x 5 performed by Gala Guzmán MD at [address] Right 5/2012    large polyps ([address])    HX COLONOSCOPY  4/27/12    HX HERNIA REPAIR Right     HX KNEE REPLACEMENT Right 1/2016    HX ORTHOPAEDIC Bilateral     hand    HX OTHER SURGICAL  2005    melanoma R arm    [place]    HX UROLOGICAL  1990    bladder CA (TCC); TURB with XRT       Social History     Socioeconomic History    Marital status:      Spouse name: Not on file    Number of children: Not on file    Years of education: Not on file    Highest education level: Not on file   Occupational History    Not on file   Social Needs    Financial resource strain: Not on file    Food insecurity:     Worry: Not on file     Inability: Not on file    Transportation needs:     Medical: Not on file     Non-medical: Not on file   Tobacco Use    Smoking status: Never Smoker    Smokeless tobacco: Never Used   Substance and Sexual Activity    Alcohol use:  Yes Alcohol/week: 0.0 standard drinks    Drug use: No    Sexual activity: Never   Lifestyle    Physical activity:     Days per week: Not on file     Minutes per session: Not on file    Stress: Not on file   Relationships    Social connections:     Talks on phone: Not on file     Gets together: Not on file     Attends [employer] service: Not on file     Active member of club or organization: Not on file     Attends meetings of clubs or organizations: Not on file     Relationship status: Not on file    Intimate partner violence:     Fear of current or ex partner: Not on file     Emotionally abused: Not on file     Physically abused: Not on file     Forced sexual activity: Not on file   Other Topics Concern    Not on file   Social History Narrative    Not on file       Allergies   Allergen Reactions    Vicodin [Hydrocodone-Acetaminophen] Swelling       Family History   Problem Relation Age of Onset    Heart Disease Father     Cancer Sister         breast       Current Outpatient Medications   Medication Sig Dispense Refill    atenolol (TENORMIN) 25 mg tablet Take 12.5 mg by mouth daily.  losartan-hydroCHLOROthiazide (HYZAAR) 100-12.5 mg per tablet Take 1 Tab by mouth daily. 90 Tab 3    rivaroxaban (XARELTO) 20 mg tab tablet Take  by mouth daily.  allopurinol (ZYLOPRIM) 100 mg tablet Take 1 Tab by mouth daily. 90 Tab 4    amLODIPine (NORVASC) 10 mg tablet Take 1 Tab by mouth daily. 90 Tab 3    atorvastatin (LIPITOR) 40 mg tablet 1/2 qd 45 Tab 3           Visit Vitals  /89   Pulse 65   Temp 98.9 °F (37.2 °C)   Resp 15   Ht 5' 10\" (1.778 m)   Wt 240 lb (108.9 kg)   BMI 34.44 kg/m²       PE  Well nourished in NAD  HEENT:  OP: clear. Neck: supple w/o mass or bruits. Chest: clear. CV: RRR w/o m,r,g; pulses NP distal legs  Abd: soft, NT, w/o HSM or mass. Ext: 1+ edema. Neuro: NF. Assessment and Plan    Encounter Diagnoses   Name Primary?     Routine general medical examination at a health care facility Yes    Essential hypertension     Paroxysmal atrial fibrillation (HCC)     Mixed hyperlipidemia        BP @ goal  Labs to assess metabolic parameters (hyperlipidemia)  PAF - on xarelto  Continue dietary/activity efforts  No change in rx  OV 6 mos or prn  I have explained plan to patient and the patient verbalizes understanding      Discussed the patient's BMI with him. The BMI follow up plan is as follows:     dietary management education, guidance, and counseling  encourage exercise  monitor weight  prescribed dietary intake    An After Visit Summary was printed and given to the patient.

## 2019-10-09 ENCOUNTER — OFFICE VISIT (OUTPATIENT)
Dept: FAMILY MEDICINE CLINIC | Age: 79
End: 2019-10-09

## 2019-10-09 VITALS
SYSTOLIC BLOOD PRESSURE: 132 MMHG | WEIGHT: 240 LBS | HEART RATE: 65 BPM | BODY MASS INDEX: 34.36 KG/M2 | HEIGHT: 70 IN | TEMPERATURE: 98.9 F | DIASTOLIC BLOOD PRESSURE: 89 MMHG | RESPIRATION RATE: 15 BRPM

## 2019-10-09 DIAGNOSIS — I48.0 PAROXYSMAL ATRIAL FIBRILLATION (HCC): ICD-10-CM

## 2019-10-09 DIAGNOSIS — E78.2 MIXED HYPERLIPIDEMIA: ICD-10-CM

## 2019-10-09 DIAGNOSIS — I10 ESSENTIAL HYPERTENSION: ICD-10-CM

## 2019-10-09 DIAGNOSIS — Z00.00 ROUTINE GENERAL MEDICAL EXAMINATION AT A HEALTH CARE FACILITY: Primary | ICD-10-CM

## 2019-10-09 DIAGNOSIS — Z23 ENCOUNTER FOR IMMUNIZATION: ICD-10-CM

## 2019-10-09 RX ORDER — ATENOLOL 25 MG/1
12.5 TABLET ORAL DAILY
COMMUNITY
End: 2020-06-25

## 2019-10-09 NOTE — PATIENT INSTRUCTIONS
Medicare Wellness Visit, Male The best way to live healthy is to have a lifestyle where you eat a well-balanced diet, exercise regularly, limit alcohol use, and quit all forms of tobacco/nicotine, if applicable. Regular preventive services are another way to keep healthy. Preventive services (vaccines, screening tests, monitoring & exams) can help personalize your care plan, which helps you manage your own care. Screening tests can find health problems at the earliest stages, when they are easiest to treat. [facility] follows the current, evidence-based guidelines published by the [facility] ([facility]) when recommending preventive services for our patients. Because we follow these guidelines, sometimes recommendations change over time as research supports it. (For example, a prostate screening blood test is no longer routinely recommended for men with no symptoms.) Of course, you and your doctor may decide to screen more often for some diseases, based on your risk and co-morbidities (chronic disease you are already diagnosed with). Preventive services for you include: - Medicare offers their members a free annual wellness visit, which is time for you and your primary care provider to discuss and plan for your preventive service needs. Take advantage of this benefit every year! 
-All adults over age 72 should receive the recommended pneumonia vaccines. Current USPSTF guidelines recommend a series of two vaccines for the best pneumonia protection.  
-All adults should have a flu vaccine yearly and an ECG.  All adults age 61 and older should receive a shingles vaccine once in their lifetime.   
-All adults age 38-68 who are overweight should have a diabetes screening test once every three years.  
-Other screening tests & preventive services for persons with diabetes include: an eye exam to screen for diabetic retinopathy, a kidney function test, a foot exam, and stricter control over your cholesterol.  
-Cardiovascular screening for adults with routine risk involves an electrocardiogram (ECG) at intervals determined by the provider.  
-Colorectal cancer screening should be done for adults age 54-65 with no increased risk factors for colorectal cancer. There are a number of acceptable methods of screening for this type of cancer. Each test has its own benefits and drawbacks. Discuss with your provider what is most appropriate for you during your annual wellness visit. The different tests include: colonoscopy (considered the best screening method), a fecal occult blood test, a fecal DNA test, and sigmoidoscopy. 
-All adults born between [facility] should be screened once for Hepatitis C. 
-An Abdominal Aortic Aneurysm (AAA) Screening is recommended for men age 73-68 who has ever smoked in their lifetime. Here is a list of your current Health Maintenance items (your personalized list of preventive services) with a due date: 
Health Maintenance Due Topic Date Due  
 DTaP/Tdap/Td  (1 - Tdap) 04/03/1961  Shingles Vaccine (1 of 2) 04/03/1990  Glaucoma Screening   04/03/2005  Flu Vaccine  08/01/2019 [address] [place] [address] Annual Well Visit  10/12/2019 DASH Diet: Care Instructions Your Care Instructions The DASH diet is an eating plan that can help lower your blood pressure. DASH stands for Dietary Approaches to Stop Hypertension. Hypertension is high blood pressure. The DASH diet focuses on eating foods that are high in calcium, potassium, and magnesium. These nutrients can lower blood pressure. The foods that are highest in these nutrients are fruits, vegetables, low-fat dairy products, nuts, seeds, and legumes. But taking calcium, potassium, and magnesium supplements instead of eating foods that are high in those nutrients does not have the same effect. The DASH diet also includes whole grains, fish, and poultry. The DASH diet is one of several lifestyle changes your doctor may recommend to lower your high blood pressure. Your doctor may also want you to decrease the amount of sodium in your diet. Lowering sodium while following the DASH diet can lower blood pressure even further than just the DASH diet alone. Follow-up care is a key part of your treatment and safety. Be sure to make and go to all appointments, and call your doctor if you are having problems. It's also a good idea to know your test results and keep a list of the medicines you take. How can you care for yourself at home? Following the DASH diet · Eat 4 to 5 servings of fruit each day. A serving is 1 medium-sized piece of fruit, ½ cup chopped or canned fruit, 1/4 cup dried fruit, or 4 ounces (½ cup) of fruit juice. Choose fruit more often than fruit juice. · Eat 4 to 5 servings of vegetables each day. A serving is 1 cup of lettuce or raw leafy vegetables, ½ cup of chopped or cooked vegetables, or 4 ounces (½ cup) of vegetable juice. Choose vegetables more often than vegetable juice. · Get 2 to 3 servings of low-fat and fat-free dairy each day. A serving is 8 ounces of milk, 1 cup of yogurt, or 1 ½ ounces of cheese. · Eat 6 to 8 servings of grains each day. A serving is 1 slice of bread, 1 ounce of dry cereal, or ½ cup of cooked rice, pasta, or cooked cereal. Try to choose whole-grain products as much as possible. · Limit lean meat, poultry, and fish to 2 servings each day. A serving is 3 ounces, about the size of a deck of cards. · Eat 4 to 5 servings of nuts, seeds, and legumes (cooked dried beans, lentils, and split peas) each week. A serving is 1/3 cup of nuts, 2 tablespoons of seeds, or ½ cup of cooked beans or peas. · Limit fats and oils to 2 to 3 servings each day. A serving is 1 teaspoon of vegetable oil or 2 tablespoons of salad dressing. · Limit sweets and added sugars to 5 servings or less a week.  A serving is 1 tablespoon jelly or jam, ½ cup sorbet, or 1 cup of lemonade. · Eat less than 2,300 milligrams (mg) of sodium a day. If you limit your sodium to 1,500 mg a day, you can lower your blood pressure even more. Tips for success · Start small. Do not try to make dramatic changes to your diet all at once. You might feel that you are missing out on your favorite foods and then be more likely to not follow the plan. Make small changes, and stick with them. Once those changes become habit, add a few more changes. · Try some of the following: ? Make it a goal to eat a fruit or vegetable at every meal and at snacks. This will make it easy to get the recommended amount of fruits and vegetables each day. ? Try yogurt topped with fruit and nuts for a snack or healthy dessert. ? Add lettuce, tomato, cucumber, and onion to sandwiches. ? Combine a ready-made pizza crust with low-fat mozzarella cheese and lots of vegetable toppings. Try using tomatoes, squash, spinach, broccoli, carrots, cauliflower, and onions. ? Have a variety of cut-up vegetables with a low-fat dip as an appetizer instead of chips and dip. ? Sprinkle sunflower seeds or chopped almonds over salads. Or try adding chopped walnuts or almonds to cooked vegetables. ? Try some vegetarian meals using beans and peas. Add garbanzo or kidney beans to salads. Make burritos and tacos with mashed yancey beans or black beans. Where can you learn more? Go to http://ana paula-carol.info/. Enter P461 in the search box to learn more about \"DASH Diet: Care Instructions. \" Current as of: April 9, 2019 Content Version: 12.2 © 4703-4604 [employer]. Care instructions adapted under license by [employer] (which disclaims liability or warranty for this information).  If you have questions about a medical condition or this instruction, always ask your healthcare professional. [employer] disclaims any warranty or liability for your use of this information. Body Mass Index: Care Instructions Your Care Instructions Body mass index (BMI) can help you see if your weight is raising your risk for health problems. It uses a formula to compare how much you weigh with how tall you are. · A BMI lower than 18.5 is considered underweight. · A BMI between 18.5 and 24.9 is considered healthy. · A BMI between 25 and 29.9 is considered overweight. A BMI of 30 or higher is considered obese. If your BMI is in the normal range, it means that you have a lower risk for weight-related health problems. If your BMI is in the overweight or obese range, you may be at increased risk for weight-related health problems, such as high blood pressure, heart disease, stroke, arthritis or joint pain, and diabetes. If your BMI is in the underweight range, you may be at increased risk for health problems such as fatigue, lower protection (immunity) against illness, muscle loss, bone loss, hair loss, and hormone problems. BMI is just one measure of your risk for weight-related health problems. You may be at higher risk for health problems if you are not active, you eat an unhealthy diet, or you drink too much alcohol or use tobacco products. Follow-up care is a key part of your treatment and safety. Be sure to make and go to all appointments, and call your doctor if you are having problems. It's also a good idea to know your test results and keep a list of the medicines you take. How can you care for yourself at home? · Practice healthy eating habits. This includes eating plenty of fruits, vegetables, whole grains, lean protein, and low-fat dairy. · If your doctor recommends it, get more exercise. Walking is a good choice. Bit by bit, increase the amount you walk every day. Try for at least 30 minutes on most days of the week. · Do not smoke. Smoking can increase your risk for health problems. If you need help quitting, talk to your doctor about stop-smoking programs and medicines. These can increase your chances of quitting for good. · Limit alcohol to 2 drinks a day for men and 1 drink a day for women. Too much alcohol can cause health problems. If you have a BMI higher than 25 · Your doctor may do other tests to check your risk for weight-related health problems. This may include measuring the distance around your waist. A waist measurement of more than 40 inches in men or 35 inches in women can increase the risk of weight-related health problems. · Talk with your doctor about steps you can take to stay healthy or improve your health. You may need to make lifestyle changes to lose weight and stay healthy, such as changing your diet and getting regular exercise. If you have a BMI lower than 18.5 · Your doctor may do other tests to check your risk for health problems. · Talk with your doctor about steps you can take to stay healthy or improve your health. You may need to make lifestyle changes to gain or maintain weight and stay healthy, such as getting more healthy foods in your diet and doing exercises to build muscle. Where can you learn more? Go to http://ana paula-carol.info/. Enter S176 in the search box to learn more about \"Body Mass Index: Care Instructions. \" Current as of: October 13, 2016 Content Version: 11.4 © 5894-3321 [employer]. Care instructions adapted under license by [employer] (which disclaims liability or warranty for this information). If you have questions about a medical condition or this instruction, always ask your healthcare professional. [place] [address] any warranty or liability for your use of this information.

## 2019-10-10 ENCOUNTER — HOSPITAL ENCOUNTER (OUTPATIENT)
Dept: LAB | Age: 79
Discharge: HOME | End: 2019-10-10
Payer: MEDICARE

## 2019-10-10 DIAGNOSIS — I10 ESSENTIAL HYPERTENSION: ICD-10-CM

## 2019-10-10 LAB
ALBUMIN SERPL-MCNC: 3.8 G/DL (ref 3.4–5)
ALBUMIN/GLOB SERPL: 1.1 {RATIO} (ref 0.8–1.7)
ALP SERPL-CCNC: 101 U/L (ref 45–117)
ALT SERPL-CCNC: 16 U/L (ref 16–61)
ANION GAP SERPL CALC-SCNC: 8 MMOL/L (ref 3–18)
AST SERPL-CCNC: 15 U/L (ref 10–38)
BILIRUB SERPL-MCNC: 0.9 MG/DL (ref 0.2–1)
BUN SERPL-MCNC: 24 MG/DL (ref 7–18)
BUN/CREAT SERPL: 14 (ref 12–20)
CALCIUM SERPL-MCNC: 8.9 MG/DL (ref 8.5–10.1)
CHLORIDE SERPL-SCNC: 106 MMOL/L (ref 100–111)
CO2 SERPL-SCNC: 26 MMOL/L (ref 21–32)
CREAT SERPL-MCNC: 1.77 MG/DL (ref 0.6–1.3)
GLOBULIN SER CALC-MCNC: 3.6 G/DL (ref 2–4)
GLUCOSE SERPL-MCNC: 98 MG/DL (ref 74–99)
POTASSIUM SERPL-SCNC: 3.7 MMOL/L (ref 3.5–5.5)
PROT SERPL-MCNC: 7.4 G/DL (ref 6.4–8.2)
SODIUM SERPL-SCNC: 140 MMOL/L (ref 136–145)

## 2019-10-10 PROCEDURE — 80053 COMPREHEN METABOLIC PANEL: CPT

## 2019-10-10 PROCEDURE — 36415 COLL VENOUS BLD VENIPUNCTURE: CPT

## 2019-10-10 PROCEDURE — 80061 LIPID PANEL: CPT

## 2019-10-11 LAB
CHOLEST SERPL-MCNC: 154 MG/DL
HDLC SERPL-MCNC: 40 MG/DL (ref 40–60)
HDLC SERPL: 3.9 {RATIO} (ref 0–5)
LDLC SERPL CALC-MCNC: 85.8 MG/DL (ref 0–100)
LIPID PROFILE,FLP: NORMAL
TRIGL SERPL-MCNC: 141 MG/DL (ref ?–150)
VLDLC SERPL CALC-MCNC: 28.2 MG/DL

## 2019-10-16 RX ORDER — AMLODIPINE BESYLATE 10 MG/1
10 TABLET ORAL DAILY
Qty: 90 TAB | Refills: 3 | Status: SHIPPED | OUTPATIENT
Start: 2019-10-16 | End: 2020-10-10 | Stop reason: SDUPTHER

## 2019-10-16 RX ORDER — ALLOPURINOL 100 MG/1
100 TABLET ORAL DAILY
Qty: 90 TAB | Refills: 4 | Status: SHIPPED | OUTPATIENT
Start: 2019-10-16 | End: 2021-01-08

## 2019-11-07 RX ORDER — LOSARTAN POTASSIUM 100 MG/1
100 TABLET ORAL DAILY
Qty: 90 TAB | Refills: 3 | Status: SHIPPED | OUTPATIENT
Start: 2019-11-07 | End: 2020-06-25

## 2019-11-07 RX ORDER — HYDROCHLOROTHIAZIDE 12.5 MG/1
12.5 TABLET ORAL DAILY
Qty: 90 TAB | Refills: 3 | Status: SHIPPED | OUTPATIENT
Start: 2019-11-07 | End: 2020-06-25

## 2020-01-20 RX ORDER — ATORVASTATIN CALCIUM 40 MG/1
20 TABLET, FILM COATED ORAL DAILY
Qty: 45 TAB | Refills: 1 | Status: SHIPPED | OUTPATIENT
Start: 2020-01-20 | End: 2020-07-12

## 2020-01-20 NOTE — TELEPHONE ENCOUNTER
Last Visit: 10/9/19 with MD Dewey Gan  Next Appointment: 4/8/20 with MD Dewey Gan  Previous Refill Encounter(s): 7/8/18 #45 with 3 refills    Requested Prescriptions     Pending Prescriptions Disp Refills    atorvastatin (LIPITOR) 40 mg tablet 45 Tab 3     Sig: Take 0.5 Tabs by mouth daily.

## 2020-03-22 DIAGNOSIS — I10 ESSENTIAL HYPERTENSION: Primary | ICD-10-CM

## 2020-06-15 NOTE — PROGRESS NOTES
HPI:   F/u visit for routine evaluation of HTN, PAF, hyperlipidemia  Chol 154 Oct 2019  No acute issues  Currently w/o chest pain/abd. discomfort; no increased dyspnea, cough or pedal edema; denies constitutional complaints of fever, night sweats or wt loss; no evidence of GI/ hemorrhage    ROS is otherwise negative. 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    Venous insufficiency        Past Surgical History:   Procedure Laterality Date    COLONOSCOPY N/A 1/11/2019    COLONOSCOPY, SURVEILLANCE hot poplypectomy, w ENDOCLIP x 5 performed by Marcie Pires MD at [address] Right 5/2012    large polyps ([address]    HX COLONOSCOPY  4/27/12    HX HERNIA REPAIR Right     HX KNEE REPLACEMENT Right 1/2016    HX ORTHOPAEDIC Bilateral     hand    HX OTHER SURGICAL  2005    melanoma R arm    [place]    HX UROLOGICAL  1990    bladder CA (TCC); TURB with XRT       Social History     Socioeconomic History    Marital status:      Spouse name: Not on file    Number of children: Not on file    Years of education: Not on file    Highest education level: Not on file   Occupational History    Not on file   Social Needs    Financial resource strain: Not on file    Food insecurity     Worry: Not on file     Inability: Not on file    Transportation needs     Medical: Not on file     Non-medical: Not on file   Tobacco Use    Smoking status: Never Smoker    Smokeless tobacco: Never Used   Substance and Sexual Activity    Alcohol use:  Yes     Alcohol/week: 0.0 standard drinks    Drug use: No    Sexual activity: Never   Lifestyle    Physical activity     Days per week: Not on file     Minutes per session: Not on file    Stress: Not on file   Relationships    Social connections     Talks on phone: Not on file     Gets together: Not on file     Attends Tenriism service: Not on file     Active member of club or organization: Not on file     Attends meetings of clubs or organizations: Not on file     Relationship status: Not on file    Intimate partner violence     Fear of current or ex partner: Not on file     Emotionally abused: Not on file     Physically abused: Not on file     Forced sexual activity: Not on file   Other Topics Concern    Not on file   Social History Narrative    Not on file       Allergies   Allergen Reactions    Vicodin [Hydrocodone-Acetaminophen] Swelling       Family History   Problem Relation Age of Onset    Heart Disease Father     Cancer Sister         breast       Current Outpatient Medications   Medication Sig Dispense Refill    atorvastatin (LIPITOR) 40 mg tablet Take 0.5 Tabs by mouth daily. 45 Tab 1    allopurinol (ZYLOPRIM) 100 mg tablet Take 1 Tab by mouth daily. 90 Tab 4    amLODIPine (NORVASC) 10 mg tablet Take 1 Tab by mouth daily. 90 Tab 3    losartan-hydroCHLOROthiazide (HYZAAR) 100-12.5 mg per tablet Take 1 Tab by mouth daily. 90 Tab 3    rivaroxaban (XARELTO) 20 mg tab tablet Take  by mouth daily. Visit Vitals  /76   Pulse 87   Temp 98.4 °F (36.9 °C) (Tympanic)   Resp 16   Ht 5' 10\" (1.778 m)   Wt 237 lb (107.5 kg)   SpO2 97%   BMI 34.01 kg/m²       PE  Heavy in NAD  HEENT:  OP: clear. Neck: supple w/o mass or bruits. Chest: clear. CV: RRR w/o m,r,g; pulses NP distal legs  Abd: soft, NT, w/o HSM or mass. Ext: 1+ edema. Neuro: NF. Assessment and Plan    Encounter Diagnoses   Name Primary?     Essential hypertension Yes    Paroxysmal atrial fibrillation (HCC)     Mixed hyperlipidemia      HTN - controlled  hyperlipidemia - continue dietary/exercise efforts  PAF - on xarelto  No change in rx  OV 6 mos or prn  I have explained plan to patient and the patient verbalizes understanding

## 2020-06-25 ENCOUNTER — OFFICE VISIT (OUTPATIENT)
Dept: FAMILY MEDICINE CLINIC | Age: 80
End: 2020-06-25

## 2020-06-25 VITALS
WEIGHT: 237 LBS | SYSTOLIC BLOOD PRESSURE: 115 MMHG | BODY MASS INDEX: 33.93 KG/M2 | RESPIRATION RATE: 16 BRPM | OXYGEN SATURATION: 97 % | TEMPERATURE: 98.4 F | DIASTOLIC BLOOD PRESSURE: 76 MMHG | HEIGHT: 70 IN | HEART RATE: 87 BPM

## 2020-06-25 DIAGNOSIS — I10 ESSENTIAL HYPERTENSION: Primary | ICD-10-CM

## 2020-06-25 DIAGNOSIS — E78.2 MIXED HYPERLIPIDEMIA: ICD-10-CM

## 2020-06-25 DIAGNOSIS — I48.0 PAROXYSMAL ATRIAL FIBRILLATION (HCC): ICD-10-CM

## 2020-06-25 NOTE — PROGRESS NOTES
Chief Complaint   Patient presents with    Follow-up       Pt preferred language for health care discussion is english. Is someone accompanying this pt? YES wife  Is the patient using any DME equipment during OV? no    Depression Screening:  3 most recent [facility] [address] 36 Screens 6/25/2020 10/9/2019 4/10/2019 4/13/2018 2/10/2017 9/18/2015   Little interest or pleasure in doing things Not at all Not at all Not at all Not at all Not at all Not at all   Feeling down, depressed, irritable, or hopeless Not at all Not at all Not at all Not at all Not at all Not at all   Total Score PHQ 2 0 0 0 0 0 0       Learning Assessment:  Learning Assessment 3/23/2015 12/17/2014   PRIMARY LEARNER Patient Patient   HIGHEST LEVEL OF EDUCATION - PRIMARY LEARNER  - GRADUATED HIGH SCHOOL OR GED   PRIMARY LANGUAGE ENGLISH ENGLISH   LEARNER PREFERENCE PRIMARY LISTENING LISTENING   ANSWERED BY patient patient   RELATIONSHIP SELF SELF       Abuse Screening:  Abuse Screening Questionnaire 8/11/2016   Do you ever feel afraid of your partner? N   Are you in a relationship with someone who physically or mentally threatens you? N   Is it safe for you to go home? Y       Fall Risk  Fall Risk Assessment, last 12 mths 6/25/2020   Able to walk? Yes   Fall in past 12 months? No           Advance Directive:  1. Do you have an advance directive in place? Patient Reply:no    2. If not, would you like material regarding how to put one in place? Patient Reply: no    2. Per patient no changes to their ACP contact no. Coordination of Care:  1. Have you been to the ER, urgent care clinic since your last visit? Hospitalized since your last visit? no    2. Have you seen or consulted any other health care providers outside of the [address] [place] [address] since your last visit? Include any pap smears or colon screening.  no    Provider prefers to do his own med reconciliation

## 2020-07-12 RX ORDER — ATORVASTATIN CALCIUM 40 MG/1
TABLET, FILM COATED ORAL
Qty: 45 TAB | Refills: 1 | Status: SHIPPED | OUTPATIENT
Start: 2020-07-12 | End: 2021-01-08

## 2020-10-10 RX ORDER — AMLODIPINE BESYLATE 10 MG/1
TABLET ORAL
Qty: 90 TAB | Refills: 3 | Status: SHIPPED | OUTPATIENT
Start: 2020-10-10 | End: 2021-08-18 | Stop reason: SDUPTHER

## 2020-10-19 RX ORDER — LOSARTAN POTASSIUM 100 MG/1
TABLET ORAL
Qty: 90 TAB | Refills: 3 | Status: SHIPPED | OUTPATIENT
Start: 2020-10-19 | End: 2021-08-18 | Stop reason: SDUPTHER

## 2020-12-02 ENCOUNTER — TELEPHONE (OUTPATIENT)
Dept: FAMILY MEDICINE CLINIC | Age: 80
End: 2020-12-02

## 2020-12-02 NOTE — TELEPHONE ENCOUNTER
Called patient no answer left message to schedule labs prior to appointment on 12/31/2020. For him and his wife.

## 2020-12-02 NOTE — PROGRESS NOTES
This is the Subsequent Medicare Annual Wellness Exam, performed 12 months or more after the Initial AWV or the last Subsequent AWV    I have reviewed the patient's medical history in detail and updated the computerized patient record. Depression Risk Factor Screening:     3 most recent PHQ Screens 12/31/2020   Little interest or pleasure in doing things Not at all   Feeling down, depressed, irritable, or hopeless Not at all   Total Score PHQ 2 0       Alcohol Risk Screen   Do you average more than 1 drink per night or more than 7 drinks a week: No    In the past three months have you have had more than 4 drinks containing alcohol on one occasion: No        Functional Ability and Level of Safety:   Hearing: reduced     Activities of Daily Living: The home contains: no safety equipment. Patient does total self care     Ambulation: with mild difficulty     Fall Risk:  Fall Risk Assessment, last 12 mths 12/31/2020   Able to walk? Yes   Fall in past 12 months? 0   Do you feel unsteady? 0   Are you worried about falling 0     Abuse Screen:  Patient is not abused       Cognitive Screening   Has your family/caregiver stated any concerns about your memory: no     A&O x 3; answers questions appropriately      Assessment/Plan   Education and counseling provided:  Are appropriate based on today's review and evaluation    Diagnoses and all orders for this visit:    1. Routine general medical examination at a health care facility    2. Paroxysmal atrial fibrillation (HCC)    3. Essential hypertension    4.  Mixed hyperlipidemia        Health Maintenance Due     Health Maintenance Due   Topic Date Due    DTaP/Tdap/Td series (1 - Tdap) 04/03/1961    Shingrix Vaccine Age 50> (1 of 2) 04/03/1990    GLAUCOMA SCREENING Q2Y  04/03/2005    Flu Vaccine (1) 09/01/2020    Lipid Screen  10/10/2020       Patient Care Team   Patient Care Team:  Yoko Andrews MD as PCP - General (Internal Medicine)  Yoko Andrews MD as PCP - [facility] Provider  Heike Randhawa MD (Vascular Surgery)    History     Patient Active Problem List   Diagnosis Code    DVT (deep venous thrombosis) ([facility] [address] I82.409    Atrial fibrillation ([address] I48.91    Essential hypertension I10    Obesity E66.9    Mixed hyperlipidemia E78.2    Gout, unspecified M10.9    Advance directive discussed with patient Z71.89     Past Medical History:   Diagnosis Date    A-fib [facility])     Bladder neck contracture     Gout, unspecified     History of bladder cancer     Hypertension     Mixed hyperlipidemia     Obesity     Prostate cancer ([facility] [address]     Pulmonary embolus ([address]     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    Venous insufficiency       Past Surgical History:   Procedure Laterality Date    COLONOSCOPY N/A 1/11/2019    COLONOSCOPY, SURVEILLANCE hot poplypectomy, w ENDOCLIP x 5 performed by Esme Perales MD at [address] Right 5/2012    large polyps ([address])    HX COLONOSCOPY  4/27/12    HX HERNIA REPAIR Right     HX KNEE REPLACEMENT Right 1/2016    HX ORTHOPAEDIC Bilateral     hand    HX OTHER SURGICAL  2005    melanoma R arm    [place]    HX UROLOGICAL  1990    bladder CA (TCC); TURB with XRT     Current Outpatient Medications   Medication Sig Dispense Refill    losartan (COZAAR) 100 mg tablet TAKE 1 TABLET BY MOUTH DAILY 90 Tab 3    amLODIPine (NORVASC) 10 mg tablet TAKE 1 TABLET BY MOUTH DAILY 90 Tab 3    atorvastatin (LIPITOR) 40 mg tablet TAKE 1/2 TABLET BY MOUTH DAILY 45 Tab 1    allopurinol (ZYLOPRIM) 100 mg tablet Take 1 Tab by mouth daily. 90 Tab 4    rivaroxaban (XARELTO) 20 mg tab tablet Take  by mouth daily.        Allergies   Allergen Reactions    Vicodin [Hydrocodone-Acetaminophen] Swelling       Family History   Problem Relation Age of Onset    Heart Disease Father     Cancer Sister         breast     Social History     Tobacco Use    Smoking status: Never Smoker    Smokeless tobacco: Never Used   Substance Use Topics    Alcohol use: Yes     Alcohol/week: 0.0 standard drinks   Medicare wellness performed  Labs ordered  Continue dietary/activity efforts  Vaccines: flu vaccine already received  Advance directive discussed    PROGRESS NOTE  HPI:   F/u visit for routine evaluation of HTN, afib, hyperlipidemia  +recent PPM (?SSS)  No acute issues  Currently w/o chest pain/abd. discomfort; no increased dyspnea, cough or pedal edema; denies constitutional complaints of fever, night sweats or wt loss; no evidence of GI/ hemorrhage    ROS is otherwise negative.     Past Medical History:   Diagnosis Date    A-fib 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    Venous insufficiency        Past Surgical History:   Procedure Laterality Date    COLONOSCOPY N/A 1/11/2019    COLONOSCOPY, SURVEILLANCE hot poplypectomy, w ENDOCLIP x 5 performed by Renetta Jain MD at [address] Right 5/2012    large polyps ([address])    HX COLONOSCOPY  4/27/12    HX HERNIA REPAIR Right     HX KNEE REPLACEMENT Right 1/2016    HX ORTHOPAEDIC Bilateral     hand    HX OTHER SURGICAL  2005    melanoma R arm    [place]    HX UROLOGICAL  1990    bladder CA (TCC); TURB with XRT       Social History     Socioeconomic History    Marital status:      Spouse name: Not on file    Number of children: Not on file    Years of education: Not on file    Highest education level: Not on file   Occupational History    Not on file   Social Needs    Financial resource strain: Not on file    Food insecurity     Worry: Not on file     Inability: Not on file    Transportation needs     Medical: Not on file     Non-medical: Not on file   Tobacco Use    Smoking status: Never Smoker    Smokeless tobacco: Never Used   Substance and Sexual Activity    Alcohol use: Yes     Alcohol/week: 0.0 standard drinks    Drug use: No    Sexual activity: Never   Lifestyle    Physical activity     Days per week: Not on file     Minutes per session: Not on file    Stress: Not on file   Relationships    Social connections     Talks on phone: Not on file     Gets together: Not on file     Attends Synagogue service: Not on file     Active member of club or organization: Not on file     Attends meetings of clubs or organizations: Not on file     Relationship status: Not on file    Intimate partner violence     Fear of current or ex partner: Not on file     Emotionally abused: Not on file     Physically abused: Not on file     Forced sexual activity: Not on file   Other Topics Concern    Not on file   Social History Narrative    Not on file       Allergies   Allergen Reactions    Vicodin [Hydrocodone-Acetaminophen] Swelling       Family History   Problem Relation Age of Onset    Heart Disease Father     Cancer Sister         breast       Current Outpatient Medications   Medication Sig Dispense Refill    losartan (COZAAR) 100 mg tablet TAKE 1 TABLET BY MOUTH DAILY 90 Tab 3    amLODIPine (NORVASC) 10 mg tablet TAKE 1 TABLET BY MOUTH DAILY 90 Tab 3    atorvastatin (LIPITOR) 40 mg tablet TAKE 1/2 TABLET BY MOUTH DAILY 45 Tab 1    allopurinol (ZYLOPRIM) 100 mg tablet Take 1 Tab by mouth daily. 90 Tab 4    rivaroxaban (XARELTO) 20 mg tab tablet Take  by mouth daily. Visit Vitals  /80 (BP 1 Location: Left arm, BP Patient Position: Sitting)   Pulse 83   Temp 96.9 °F (36.1 °C) (Temporal)   Resp 16   Ht 5' 10\" (1.778 m)   Wt 231 lb (104.8 kg)   SpO2 96%   BMI 33.15 kg/m²       PE  Heavy in NAD  HEENT:  OP: clear. Neck: supple w/o mass or bruits. Chest: clear. CV: RRR w/o m,r,g; pulses intact. Abd: soft, NT, w/o HSM or mass. Ext: tr edema. Neuro: NF.     Assessment and Plan    Encounter Diagnoses   Name Primary?     Routine general medical examination at a health care facility Yes    Paroxysmal atrial fibrillation ([facility]     Essential hypertension     Mixed hyperlipidemia        SSS/afib - recent PPM; anticoagulated with xarelto  HTN - controlled  hyperlipidemia - continue dietary/exercise efforts  No change in rx  OV 6 mos or prn  I have explained plan to patient and the patient verbalizes understanding

## 2020-12-31 ENCOUNTER — HOSPITAL ENCOUNTER (OUTPATIENT)
Dept: LAB | Age: 80
Discharge: HOME | End: 2020-12-31
Payer: MEDICARE

## 2020-12-31 ENCOUNTER — OFFICE VISIT (OUTPATIENT)
Dept: FAMILY MEDICINE CLINIC | Age: 80
End: 2020-12-31
Payer: MEDICARE

## 2020-12-31 VITALS
HEART RATE: 83 BPM | WEIGHT: 231 LBS | TEMPERATURE: 96.9 F | HEIGHT: 70 IN | RESPIRATION RATE: 16 BRPM | OXYGEN SATURATION: 96 % | SYSTOLIC BLOOD PRESSURE: 130 MMHG | DIASTOLIC BLOOD PRESSURE: 80 MMHG | BODY MASS INDEX: 33.07 KG/M2

## 2020-12-31 DIAGNOSIS — E78.2 MIXED HYPERLIPIDEMIA: ICD-10-CM

## 2020-12-31 DIAGNOSIS — I10 ESSENTIAL HYPERTENSION: ICD-10-CM

## 2020-12-31 DIAGNOSIS — I48.0 PAROXYSMAL ATRIAL FIBRILLATION (HCC): ICD-10-CM

## 2020-12-31 DIAGNOSIS — Z00.00 ROUTINE GENERAL MEDICAL EXAMINATION AT A HEALTH CARE FACILITY: Primary | ICD-10-CM

## 2020-12-31 LAB
ALBUMIN SERPL-MCNC: 3.4 G/DL (ref 3.4–5)
ALBUMIN/GLOB SERPL: 1 {RATIO} (ref 0.8–1.7)
ALP SERPL-CCNC: 121 U/L (ref 45–117)
ALT SERPL-CCNC: 17 U/L (ref 16–61)
ANION GAP SERPL CALC-SCNC: 5 MMOL/L (ref 3–18)
AST SERPL-CCNC: 11 U/L (ref 10–38)
BASOPHILS # BLD: 0 K/UL (ref 0–0.1)
BASOPHILS NFR BLD: 0 % (ref 0–2)
BILIRUB SERPL-MCNC: 1.5 MG/DL (ref 0.2–1)
BUN SERPL-MCNC: 20 MG/DL (ref 7–18)
BUN/CREAT SERPL: 12 (ref 12–20)
CALCIUM SERPL-MCNC: 8.7 MG/DL (ref 8.5–10.1)
CHLORIDE SERPL-SCNC: 107 MMOL/L (ref 100–111)
CHOLEST SERPL-MCNC: 153 MG/DL
CO2 SERPL-SCNC: 30 MMOL/L (ref 21–32)
CREAT SERPL-MCNC: 1.62 MG/DL (ref 0.6–1.3)
DIFFERENTIAL METHOD BLD: ABNORMAL
EOSINOPHIL # BLD: 0.2 K/UL (ref 0–0.4)
EOSINOPHIL NFR BLD: 2 % (ref 0–5)
ERYTHROCYTE [DISTWIDTH] IN BLOOD BY AUTOMATED COUNT: 15.4 % (ref 11.6–14.5)
GLOBULIN SER CALC-MCNC: 3.4 G/DL (ref 2–4)
GLUCOSE SERPL-MCNC: 108 MG/DL (ref 74–99)
HCT VFR BLD AUTO: 44.2 % (ref 36–48)
HDLC SERPL-MCNC: 48 MG/DL (ref 40–60)
HDLC SERPL: 3.2 {RATIO} (ref 0–5)
HGB BLD-MCNC: 14.6 G/DL (ref 13–16)
LDLC SERPL CALC-MCNC: 86.8 MG/DL (ref 0–100)
LIPID PROFILE,FLP: NORMAL
LYMPHOCYTES # BLD: 1.6 K/UL (ref 0.9–3.6)
LYMPHOCYTES NFR BLD: 15 % (ref 21–52)
MCH RBC QN AUTO: 29 PG (ref 24–34)
MCHC RBC AUTO-ENTMCNC: 33 G/DL (ref 31–37)
MCV RBC AUTO: 87.7 FL (ref 74–97)
MONOCYTES # BLD: 0.9 K/UL (ref 0.05–1.2)
MONOCYTES NFR BLD: 8 % (ref 3–10)
NEUTS SEG # BLD: 8.3 K/UL (ref 1.8–8)
NEUTS SEG NFR BLD: 75 % (ref 40–73)
PLATELET # BLD AUTO: 184 K/UL (ref 135–420)
PMV BLD AUTO: 11.3 FL (ref 9.2–11.8)
POTASSIUM SERPL-SCNC: 3.8 MMOL/L (ref 3.5–5.5)
PROT SERPL-MCNC: 6.8 G/DL (ref 6.4–8.2)
RBC # BLD AUTO: 5.04 M/UL (ref 4.7–5.5)
SODIUM SERPL-SCNC: 142 MMOL/L (ref 136–145)
TRIGL SERPL-MCNC: 91 MG/DL (ref ?–150)
VLDLC SERPL CALC-MCNC: 18.2 MG/DL
WBC # BLD AUTO: 11.1 K/UL (ref 4.6–13.2)

## 2020-12-31 PROCEDURE — 1101F PT FALLS ASSESS-DOCD LE1/YR: CPT | Performed by: INTERNAL MEDICINE

## 2020-12-31 PROCEDURE — G0463 HOSPITAL OUTPT CLINIC VISIT: HCPCS | Performed by: INTERNAL MEDICINE

## 2020-12-31 PROCEDURE — G0439 PPPS, SUBSEQ VISIT: HCPCS | Performed by: INTERNAL MEDICINE

## 2020-12-31 PROCEDURE — G8419 CALC BMI OUT NRM PARAM NOF/U: HCPCS | Performed by: INTERNAL MEDICINE

## 2020-12-31 PROCEDURE — 80061 LIPID PANEL: CPT

## 2020-12-31 PROCEDURE — 85025 COMPLETE CBC W/AUTO DIFF WBC: CPT

## 2020-12-31 PROCEDURE — G8427 DOCREV CUR MEDS BY ELIG CLIN: HCPCS | Performed by: INTERNAL MEDICINE

## 2020-12-31 PROCEDURE — 36415 COLL VENOUS BLD VENIPUNCTURE: CPT

## 2020-12-31 PROCEDURE — G8752 SYS BP LESS 140: HCPCS | Performed by: INTERNAL MEDICINE

## 2020-12-31 PROCEDURE — G8754 DIAS BP LESS 90: HCPCS | Performed by: INTERNAL MEDICINE

## 2020-12-31 PROCEDURE — G8510 SCR DEP NEG, NO PLAN REQD: HCPCS | Performed by: INTERNAL MEDICINE

## 2020-12-31 PROCEDURE — G8536 NO DOC ELDER MAL SCRN: HCPCS | Performed by: INTERNAL MEDICINE

## 2020-12-31 PROCEDURE — 80053 COMPREHEN METABOLIC PANEL: CPT

## 2020-12-31 PROCEDURE — 99214 OFFICE O/P EST MOD 30 MIN: CPT | Performed by: INTERNAL MEDICINE

## 2020-12-31 NOTE — PROGRESS NOTES
Chief Complaint   Patient presents with    Follow Up Chronic Condition       Pt preferred language for health care discussion is english. Is someone accompanying this pt? no    Is the patient using any DME equipment during OV? no    Depression Screening:  3 most recent [facility] Screens 12/31/2020 6/25/2020 6/25/2020 10/9/2019 4/10/2019 4/13/2018 2/10/2017   Little interest or pleasure in doing things Not at all Not at all Not at all Not at all Not at all Not at all Not at all   Feeling down, depressed, irritable, or hopeless Not at all Not at all Not at all Not at all Not at all Not at all Not at all   Total Score PHQ 2 0 0 0 0 0 0 0       Learning Assessment:  Learning Assessment 3/23/2015 12/17/2014   PRIMARY LEARNER Patient Patient   HIGHEST LEVEL OF EDUCATION - PRIMARY LEARNER  - GRADUATED HIGH SCHOOL OR GED   PRIMARY LANGUAGE ENGLISH ENGLISH   LEARNER PREFERENCE PRIMARY LISTENING LISTENING   ANSWERED BY patient patient   RELATIONSHIP SELF SELF       Abuse Screening:  Abuse Screening Questionnaire 8/11/2016   Do you ever feel afraid of your partner? N   Are you in a relationship with someone who physically or mentally threatens you? N   Is it safe for you to go home? Y       Fall Risk  Fall Risk Assessment, last 12 mths 12/31/2020   Able to walk? Yes   Fall in past 12 months? 0   Do you feel unsteady? 0   Are you worried about falling 0           Advance Directive:  1. Do you have an advance directive in place? Patient Reply:no    2. If not, would you like material regarding how to put one in place? Patient Reply: no    2. Per patient no changes to their ACP contact no. Coordination of Care:  1. Have you been to the ER, urgent care clinic since your last visit? Hospitalized since your last visit? no    2. Have you seen or consulted any other health care providers outside of the [address] [place] [address] since your last visit? Include any pap smears or colon screening.  no    Provider prefers to do his own med reconciliation

## 2021-01-08 RX ORDER — ALLOPURINOL 100 MG/1
TABLET ORAL
Qty: 90 TAB | Refills: 4 | Status: SHIPPED | OUTPATIENT
Start: 2021-01-08 | End: 2021-08-18 | Stop reason: SDUPTHER

## 2021-01-08 RX ORDER — ATORVASTATIN CALCIUM 40 MG/1
TABLET, FILM COATED ORAL
Qty: 45 TAB | Refills: 1 | Status: SHIPPED | OUTPATIENT
Start: 2021-01-08 | End: 2021-07-13 | Stop reason: SDUPTHER

## 2021-05-10 ENCOUNTER — TELEPHONE (OUTPATIENT)
Dept: FAMILY MEDICINE CLINIC | Age: 81
End: 2021-05-10

## 2021-06-01 ENCOUNTER — TELEPHONE (OUTPATIENT)
Dept: FAMILY MEDICINE CLINIC | Age: 81
End: 2021-06-01

## 2021-07-13 RX ORDER — ATORVASTATIN CALCIUM 40 MG/1
20 TABLET, FILM COATED ORAL DAILY
Qty: 45 TABLET | Refills: 0 | Status: SHIPPED | OUTPATIENT
Start: 2021-07-13 | End: 2021-08-18 | Stop reason: SDUPTHER

## 2021-07-13 NOTE — TELEPHONE ENCOUNTER
Last Visit: 12/31/20 with MD Charline Arzola  Next Appointment: 8/18/21 with NP Socrates Lopez  Previous Refill Encounter(s): 1/8/21 345 with 1 refill    Requested Prescriptions     Pending Prescriptions Disp Refills    atorvastatin (LIPITOR) 40 mg tablet 45 Tablet 0     Sig: Take 0.5 Tablets by mouth daily.

## 2021-08-18 ENCOUNTER — OFFICE VISIT (OUTPATIENT)
Dept: FAMILY MEDICINE CLINIC | Age: 81
End: 2021-08-18
Payer: MEDICARE

## 2021-08-18 VITALS
HEIGHT: 70 IN | SYSTOLIC BLOOD PRESSURE: 127 MMHG | WEIGHT: 226.6 LBS | HEART RATE: 75 BPM | TEMPERATURE: 97.1 F | BODY MASS INDEX: 32.44 KG/M2 | OXYGEN SATURATION: 94 % | DIASTOLIC BLOOD PRESSURE: 78 MMHG | RESPIRATION RATE: 16 BRPM

## 2021-08-18 DIAGNOSIS — E78.2 MIXED HYPERLIPIDEMIA: ICD-10-CM

## 2021-08-18 DIAGNOSIS — I10 ESSENTIAL HYPERTENSION: Primary | ICD-10-CM

## 2021-08-18 DIAGNOSIS — I48.0 PAROXYSMAL ATRIAL FIBRILLATION (HCC): ICD-10-CM

## 2021-08-18 DIAGNOSIS — M1A.9XX1 CHRONIC GOUT WITH TOPHUS, UNSPECIFIED CAUSE, UNSPECIFIED SITE: ICD-10-CM

## 2021-08-18 PROCEDURE — G8417 CALC BMI ABV UP PARAM F/U: HCPCS | Performed by: NURSE PRACTITIONER

## 2021-08-18 PROCEDURE — G0463 HOSPITAL OUTPT CLINIC VISIT: HCPCS | Performed by: NURSE PRACTITIONER

## 2021-08-18 PROCEDURE — 99214 OFFICE O/P EST MOD 30 MIN: CPT | Performed by: NURSE PRACTITIONER

## 2021-08-18 PROCEDURE — G8754 DIAS BP LESS 90: HCPCS | Performed by: NURSE PRACTITIONER

## 2021-08-18 PROCEDURE — G8536 NO DOC ELDER MAL SCRN: HCPCS | Performed by: NURSE PRACTITIONER

## 2021-08-18 PROCEDURE — G8752 SYS BP LESS 140: HCPCS | Performed by: NURSE PRACTITIONER

## 2021-08-18 PROCEDURE — G8427 DOCREV CUR MEDS BY ELIG CLIN: HCPCS | Performed by: NURSE PRACTITIONER

## 2021-08-18 PROCEDURE — 1101F PT FALLS ASSESS-DOCD LE1/YR: CPT | Performed by: NURSE PRACTITIONER

## 2021-08-18 PROCEDURE — G8432 DEP SCR NOT DOC, RNG: HCPCS | Performed by: NURSE PRACTITIONER

## 2021-08-18 RX ORDER — ATORVASTATIN CALCIUM 40 MG/1
20 TABLET, FILM COATED ORAL DAILY
Qty: 45 TABLET | Refills: 3 | Status: SHIPPED | OUTPATIENT
Start: 2021-08-18 | End: 2022-10-21

## 2021-08-18 RX ORDER — AMLODIPINE BESYLATE 10 MG/1
10 TABLET ORAL DAILY
Qty: 90 TABLET | Refills: 3 | Status: SHIPPED | OUTPATIENT
Start: 2021-08-18 | End: 2022-10-11

## 2021-08-18 RX ORDER — METOPROLOL SUCCINATE 100 MG/1
50 TABLET, EXTENDED RELEASE ORAL DAILY
COMMUNITY
End: 2021-08-18 | Stop reason: DRUGHIGH

## 2021-08-18 RX ORDER — METOPROLOL SUCCINATE 50 MG/1
50 TABLET, EXTENDED RELEASE ORAL DAILY
COMMUNITY

## 2021-08-18 RX ORDER — LOSARTAN POTASSIUM 100 MG/1
100 TABLET ORAL DAILY
Qty: 90 TABLET | Refills: 3 | Status: SHIPPED | OUTPATIENT
Start: 2021-08-18 | End: 2022-10-21

## 2021-08-18 RX ORDER — ALLOPURINOL 100 MG/1
100 TABLET ORAL DAILY
Qty: 90 TABLET | Refills: 3 | Status: SHIPPED | OUTPATIENT
Start: 2021-08-18 | End: 2022-09-30

## 2021-08-18 NOTE — PROGRESS NOTES
Hypertension    Subjective:     Red Pritchett Sr. is a 80 y.o. WHITE/NON- male who presents for follow-up of hypertension. Pt denies any SOB, edema, CP, orthopnea, palpitations, nocturnal dyspnea, headaches, or blurred vision. Diet and Lifestyle: not attempting to follow a low sodium diet  Home BP Monitoring: is not measured at home    Cardiovascular ROS: taking medications as instructed, no medication side effects noted, no TIA's, no chest pain on exertion, no dyspnea on exertion, noting swelling of ankles. New concerns: Right hand nodule with psoriatic top popped up on his seconf knuckel 2-3 weeks ago. Pt states that it doesn't itch but it can be kind of painful when touched. Pt sees Dr. Lavern Hastings with [facility] yearly and is actully taking his wife to see him in two weeks for an area that looks the same as his on her right hand. Key Antihyperlipidemia Meds             atorvastatin (LIPITOR) 40 mg tablet (Taking) Take 0.5 Tablets by mouth daily. Current Outpatient Medications   Medication Sig Dispense Refill    atorvastatin (LIPITOR) 40 mg tablet Take 0.5 Tablets by mouth daily. 45 Tablet 3    metoprolol succinate (TOPROL-XL) 50 mg XL tablet Take 50 mg by mouth daily.  allopurinoL (ZYLOPRIM) 100 mg tablet Take 1 Tablet by mouth daily. 90 Tablet 3    amLODIPine (NORVASC) 10 mg tablet Take 1 Tablet by mouth daily. 90 Tablet 3    losartan (COZAAR) 100 mg tablet Take 1 Tablet by mouth daily. 90 Tablet 3    rivaroxaban (XARELTO) 20 mg tab tablet Take  by mouth daily.          Lab Results   Component Value Date/Time    Cholesterol, total 153 12/31/2020 08:37 AM    HDL Cholesterol 48 12/31/2020 08:37 AM    LDL, calculated 86.8 12/31/2020 08:37 AM    Triglyceride 91 12/31/2020 08:37 AM    CHOL/HDL Ratio 3.2 12/31/2020 08:37 AM     Lab Results   Component Value Date/Time    Sodium 142 12/31/2020 08:37 AM    Potassium 3.8 12/31/2020 08:37 AM    Chloride 107 12/31/2020 08:37 AM    CO2 30 12/31/2020 08:37 AM    Anion gap 5 12/31/2020 08:37 AM    Glucose 108 (H) 12/31/2020 08:37 AM    BUN 20 (H) 12/31/2020 08:37 AM    Creatinine 1.62 (H) 12/31/2020 08:37 AM    BUN/Creatinine ratio 12 12/31/2020 08:37 AM    GFR est AA 50 (L) 12/31/2020 08:37 AM    GFR est non-AA 41 (L) 12/31/2020 08:37 AM    Calcium 8.7 12/31/2020 08:37 AM         Review of Systems, additional:  Pertinent items are noted in HPI. Objective:     Visit Vitals  /78 (BP 1 Location: Right arm, BP Patient Position: Sitting, BP Cuff Size: Adult)   Pulse 75   Temp 97.1 °F (36.2 °C)   Resp 16   Ht 5' 10\" (1.778 m)   Wt 226 lb 9.6 oz (102.8 kg)   SpO2 94%   BMI 32.51 kg/m²       Lab Results   Component Value Date/Time    Sodium 142 12/31/2020 08:37 AM    Potassium 3.8 12/31/2020 08:37 AM    Chloride 107 12/31/2020 08:37 AM    CO2 30 12/31/2020 08:37 AM    Anion gap 5 12/31/2020 08:37 AM    Glucose 108 (H) 12/31/2020 08:37 AM    BUN 20 (H) 12/31/2020 08:37 AM    Creatinine 1.62 (H) 12/31/2020 08:37 AM    BUN/Creatinine ratio 12 12/31/2020 08:37 AM    GFR est AA 50 (L) 12/31/2020 08:37 AM    GFR est non-AA 41 (L) 12/31/2020 08:37 AM    Calcium 8.7 12/31/2020 08:37 AM     Lab Results   Component Value Date/Time    WBC 11.1 12/31/2020 08:37 AM    HGB 14.6 12/31/2020 08:37 AM    HCT 44.2 12/31/2020 08:37 AM    PLATELET 392 73/88/4754 08:37 AM    MCV 87.7 12/31/2020 08:37 AM     No results found for: HBA1C, KIP8XJVM, KNV3NUCO, HJJ6ZKVH  Lab Results   Component Value Date/Time    Cholesterol, total 153 12/31/2020 08:37 AM    HDL Cholesterol 48 12/31/2020 08:37 AM    LDL, calculated 86.8 12/31/2020 08:37 AM    VLDL, calculated 18.2 12/31/2020 08:37 AM    Triglyceride 91 12/31/2020 08:37 AM    CHOL/HDL Ratio 3.2 12/31/2020 08:37 AM       Appearance: alert, well appearing, and in no distress, oriented to person, place, and time and overweight.     General exam BP noted to be well controlled today in office, S1, S2 normal, no gallop, no murmur, chest clear, no JVD, no HSM, no edema. Psoriatic nodule noted to left hand located on second proximal phalanx. Lab review: orders written for new lab studies as appropriate; see orders. Assessment/Plan:     hypertension stable, hyperlipidemia stable. current treatment plan is effective, no change in therapy  orders and follow up as documented in patient record  recommended sodium restriction. ICD-10-CM ICD-9-CM    1. Essential hypertension  I10 401.9 amLODIPine (NORVASC) 10 mg tablet      losartan (COZAAR) 100 mg tablet      METABOLIC PANEL, BASIC   2. Paroxysmal atrial fibrillation (HCC)  I48.0 427.31    3. Mixed hyperlipidemia  E78.2 272.2 atorvastatin (LIPITOR) 40 mg tablet      LIPID PANEL      ALT      AST   4. Chronic gout with tophus, unspecified cause, unspecified site  M1A. 9XX1 274.03 allopurinoL (ZYLOPRIM) 100 mg tablet       Follow-up and Dispositions    · Return in about 6 months (around 2/18/2022) for Hypertension, High Cholesterol, gout, (In Office), please schedule lab appointment. .          Disclaimer:    I have discussed the diagnosis with the patient and the intended plan as seen above. The patient understands our medical plan. The risks, benefits and significant side effects of all medications have been reviewed. Anticipated time course and progression of condition reviewed. All questions have been addressed. He received an after visit summary, with information reviewed, and questions answered. Where appropriate, he is instructed to call the clinic if he has not been notified either by phone or through [address] with the results of his tests or with an appointment plan for any referrals within 1 week(s). The patient  is to call if his condition worsens or fails to improve or if significant side effects are experienced.      Lopez Mejia NP     08/18/21

## 2021-08-19 ENCOUNTER — HOSPITAL ENCOUNTER (OUTPATIENT)
Dept: LAB | Age: 81
Discharge: HOME | End: 2021-08-19
Payer: MEDICARE

## 2021-08-19 ENCOUNTER — APPOINTMENT (OUTPATIENT)
Dept: FAMILY MEDICINE CLINIC | Age: 81
End: 2021-08-19

## 2021-08-19 DIAGNOSIS — E78.2 MIXED HYPERLIPIDEMIA: ICD-10-CM

## 2021-08-19 DIAGNOSIS — I10 ESSENTIAL HYPERTENSION: ICD-10-CM

## 2021-08-19 LAB
ALT SERPL-CCNC: 19 U/L (ref 16–61)
ANION GAP SERPL CALC-SCNC: 6 MMOL/L (ref 3–18)
AST SERPL-CCNC: 19 U/L (ref 10–38)
BUN SERPL-MCNC: 17 MG/DL (ref 7–18)
BUN/CREAT SERPL: 11 (ref 12–20)
CALCIUM SERPL-MCNC: 8.4 MG/DL (ref 8.5–10.1)
CHLORIDE SERPL-SCNC: 108 MMOL/L (ref 100–111)
CHOLEST SERPL-MCNC: 148 MG/DL
CO2 SERPL-SCNC: 28 MMOL/L (ref 21–32)
CREAT SERPL-MCNC: 1.53 MG/DL (ref 0.6–1.3)
GLUCOSE SERPL-MCNC: 100 MG/DL (ref 74–99)
HDLC SERPL-MCNC: 49 MG/DL (ref 40–60)
HDLC SERPL: 3 {RATIO} (ref 0–5)
LDLC SERPL CALC-MCNC: 74.2 MG/DL (ref 0–100)
LIPID PROFILE,FLP: NORMAL
POTASSIUM SERPL-SCNC: 3.6 MMOL/L (ref 3.5–5.5)
SODIUM SERPL-SCNC: 142 MMOL/L (ref 136–145)
TRIGL SERPL-MCNC: 124 MG/DL (ref ?–150)
VLDLC SERPL CALC-MCNC: 24.8 MG/DL

## 2021-08-19 PROCEDURE — 80048 BASIC METABOLIC PNL TOTAL CA: CPT

## 2021-08-19 PROCEDURE — 84450 TRANSFERASE (AST) (SGOT): CPT

## 2021-08-19 PROCEDURE — 36415 COLL VENOUS BLD VENIPUNCTURE: CPT

## 2021-08-19 PROCEDURE — 84460 ALANINE AMINO (ALT) (SGPT): CPT

## 2021-08-19 PROCEDURE — 80061 LIPID PANEL: CPT

## 2021-09-29 DIAGNOSIS — N18.32 STAGE 3B CHRONIC KIDNEY DISEASE (HCC): Primary | ICD-10-CM

## 2022-02-18 ENCOUNTER — OFFICE VISIT (OUTPATIENT)
Dept: FAMILY MEDICINE CLINIC | Age: 82
End: 2022-02-18
Payer: MEDICARE

## 2022-02-18 VITALS
TEMPERATURE: 97.3 F | HEART RATE: 57 BPM | BODY MASS INDEX: 33.64 KG/M2 | WEIGHT: 235 LBS | RESPIRATION RATE: 16 BRPM | OXYGEN SATURATION: 96 % | DIASTOLIC BLOOD PRESSURE: 73 MMHG | SYSTOLIC BLOOD PRESSURE: 126 MMHG | HEIGHT: 70 IN

## 2022-02-18 DIAGNOSIS — Z13.31 SCREENING FOR DEPRESSION: ICD-10-CM

## 2022-02-18 DIAGNOSIS — M79.89 LEG SWELLING: ICD-10-CM

## 2022-02-18 DIAGNOSIS — C61 PROSTATE CANCER (HCC): ICD-10-CM

## 2022-02-18 DIAGNOSIS — I48.0 PAROXYSMAL ATRIAL FIBRILLATION (HCC): ICD-10-CM

## 2022-02-18 DIAGNOSIS — I10 ESSENTIAL HYPERTENSION: ICD-10-CM

## 2022-02-18 DIAGNOSIS — R19.09 LEFT GROIN MASS: ICD-10-CM

## 2022-02-18 DIAGNOSIS — E78.2 MIXED HYPERLIPIDEMIA: ICD-10-CM

## 2022-02-18 DIAGNOSIS — Z00.00 MEDICARE ANNUAL WELLNESS VISIT, SUBSEQUENT: Primary | ICD-10-CM

## 2022-02-18 DIAGNOSIS — I26.99 PULMONARY EMBOLISM, UNSPECIFIED CHRONICITY, UNSPECIFIED PULMONARY EMBOLISM TYPE, UNSPECIFIED WHETHER ACUTE COR PULMONALE PRESENT (HCC): ICD-10-CM

## 2022-02-18 PROBLEM — Z95.0 PACEMAKER: Status: ACTIVE | Noted: 2021-01-01

## 2022-02-18 PROCEDURE — G8427 DOCREV CUR MEDS BY ELIG CLIN: HCPCS | Performed by: NURSE PRACTITIONER

## 2022-02-18 PROCEDURE — G8432 DEP SCR NOT DOC, RNG: HCPCS | Performed by: NURSE PRACTITIONER

## 2022-02-18 PROCEDURE — G8417 CALC BMI ABV UP PARAM F/U: HCPCS | Performed by: NURSE PRACTITIONER

## 2022-02-18 PROCEDURE — G8752 SYS BP LESS 140: HCPCS | Performed by: NURSE PRACTITIONER

## 2022-02-18 PROCEDURE — 1101F PT FALLS ASSESS-DOCD LE1/YR: CPT | Performed by: NURSE PRACTITIONER

## 2022-02-18 PROCEDURE — G0439 PPPS, SUBSEQ VISIT: HCPCS | Performed by: NURSE PRACTITIONER

## 2022-02-18 PROCEDURE — G8754 DIAS BP LESS 90: HCPCS | Performed by: NURSE PRACTITIONER

## 2022-02-18 PROCEDURE — G8536 NO DOC ELDER MAL SCRN: HCPCS | Performed by: NURSE PRACTITIONER

## 2022-02-18 RX ORDER — ACETAMINOPHEN 500 MG
TABLET ORAL DAILY
COMMUNITY

## 2022-02-18 RX ORDER — BUMETANIDE 0.5 MG/1
0.5 TABLET ORAL
Qty: 30 TABLET | Refills: 0 | Status: SHIPPED | OUTPATIENT
Start: 2022-02-18 | End: 2022-02-21

## 2022-02-18 NOTE — PROGRESS NOTES
This is the Subsequent Medicare Annual Wellness Exam, performed 12 months or more after the Initial AWV or the last Subsequent AWV    I have reviewed the patient's medical history in detail and updated the computerized patient record. Assessment/Plan   Education and counseling provided:  Are appropriate based on today's review and evaluation    1. Medicare annual wellness visit, subsequent  2. Essential hypertension  -     METABOLIC PANEL, BASIC; Future  3. Mixed hyperlipidemia  -     LIPID PANEL; Future  4. Paroxysmal atrial fibrillation (HCC)  5. Screening for depression  -     DEPRESSION SCREEN ANNUAL       Depression Risk Factor Screening     3 most recent PHQ Screens 2/18/2022   Little interest or pleasure in doing things Not at all   Feeling down, depressed, irritable, or hopeless Not at all   Total Score PHQ 2 0       Alcohol & Drug Abuse Risk Screen    Do you average more than 1 drink per night or more than 7 drinks a week: No    In the past three months have you have had more than 4 drinks containing alcohol on one occasion: No          Functional Ability and Level of Safety    Hearing: Hearing is good. Activities of Daily Living: The home contains: no safety equipment. Patient does total self care      Ambulation: with no difficulty     Fall Risk:  Fall Risk Assessment, last 12 mths 2/18/2022   Able to walk?  Yes   Fall in past 12 months? 0   Do you feel unsteady? -   Are you worried about falling -      Abuse Screen:  Patient is not abused       Cognitive Screening    Has your family/caregiver stated any concerns about your memory: no     Cognitive Screening: A+OX4    Health Maintenance Due     Health Maintenance Due   Topic Date Due    COVID-19 Vaccine (1) Never done    DTaP/Tdap/Td series (1 - Tdap) Never done    Shingrix Vaccine Age 50> (1 of 2) Never done    Flu Vaccine (1) 09/01/2021       Patient Care Team   Patient Care Team:  Angy Cintron NP as PCP - General (Nurse Practitioner)  Giovany Carreno NP as PCP - [address] Provider  Tamar Nunes MD (Vascular Surgery)    History     Patient Active Problem List   Diagnosis Code    Atrial fibrillation [facility]) I48.91    Essential hypertension I10    Obesity E66.9    Mixed hyperlipidemia E78.2    Gout, unspecified M10.9    Advance directive discussed with patient Z71.89    Bradycardia R00.1     Past Medical History:   Diagnosis Date    A-fib [facility])     Bladder neck contracture     Bradycardia     Gout, unspecified     History of bladder cancer     Hypertension     Mixed hyperlipidemia     Obesity     Prostate cancer ([facility] [address] [facility]     Pulmonary embolus ([facility] [address]     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    Venous insufficiency       Past Surgical History:   Procedure Laterality Date    COLONOSCOPY N/A 1/11/2019    COLONOSCOPY, SURVEILLANCE hot poplypectomy, w ENDOCLIP x 5 performed by Dea Willis MD at [address] [place] [address] HX COLONOSCOPY  4/27/12    HX HERNIA REPAIR Right     HX KNEE REPLACEMENT Right 1/2016    HX ORTHOPAEDIC Bilateral     hand    HX OTHER SURGICAL  2005    melanoma R arm    HX PACEMAKER PLACEMENT Left 05/05/2021    [place]    HX TOTAL COLECTOMY Right 5/2012    large polyps ([address])    HX UROLOGICAL  1990    bladder CA (TCC); TURB with XRT     Current Outpatient Medications   Medication Sig Dispense Refill    atorvastatin (LIPITOR) 40 mg tablet Take 0.5 Tablets by mouth daily. 45 Tablet 3    metoprolol succinate (TOPROL-XL) 50 mg XL tablet Take 50 mg by mouth daily.  allopurinoL (ZYLOPRIM) 100 mg tablet Take 1 Tablet by mouth daily. 90 Tablet 3    amLODIPine (NORVASC) 10 mg tablet Take 1 Tablet by mouth daily. 90 Tablet 3    losartan (COZAAR) 100 mg tablet Take 1 Tablet by mouth daily. 90 Tablet 3    rivaroxaban (XARELTO) 20 mg tab tablet Take  by mouth daily.        Allergies   Allergen Reactions    Vicodin [Hydrocodone-Acetaminophen] Swelling       Family History Problem Relation Age of Onset    Heart Disease Father     Cancer Sister         breast     Social History     Tobacco Use    Smoking status: Never Smoker    Smokeless tobacco: Never Used   Substance Use Topics    Alcohol use:  Yes     Alcohol/week: 0.0 standard drinks         Carlyle Snell NP

## 2022-02-18 NOTE — PROGRESS NOTES
Hypertension      Assessment/Plan:     hypertension stable, hyperlipidemia stable. lab results and schedule of future lab studies reviewed with patient  repeat labs ordered prior to next appointment  reviewed diet, exercise and weight control  recommended sodium restriction  cardiovascular risk and specific lipid/LDL goals reviewed. ICD-10-CM ICD-9-CM    1. Medicare annual wellness visit, subsequent  Z00.00 V70.0    2. Essential hypertension  X85 156.8 METABOLIC PANEL, BASIC   3. Mixed hyperlipidemia  E78.2 272.2 LIPID PANEL   4. Paroxysmal atrial fibrillation (HCC)  I48.0 427.31    5. Screening for depression  Z13.31 V79.0 DEPRESSION SCREEN ANNUAL   6. Leg swelling  M79.89 729.81 bumetanide (BUMEX) 0.5 mg tablet   7. Left groin mass  R19.09 789.39 US EXT NONVAS LT LTD   8. Pulmonary embolism, unspecified chronicity, unspecified pulmonary embolism type, unspecified whether acute cor pulmonale present ([facility]  I26.99 415.19    9. Prostate cancer ([facility]  C61 185        Follow-up and Dispositions    · Return in about 6 months (around 8/18/2022), or if symptoms worsen or fail to improve, for Hypertension, High Cholesterol, gout, Labs 1 Week Prior. Subjective:     Chema Mckenzie Sr. is a 80 y.o. WHITE/NON- male who presents for follow-up of hypertension. Pt denies any SOB, edema, CP, orthopnea, palpitations, nocturnal dyspnea, headaches, or blurred vision. Diet and Lifestyle: not attempting to follow a low fat, low cholesterol diet, not attempting to follow a low sodium diet, plays golf 2 days a week. Home BP Monitoring: is not measured at home    Cardiovascular ROS: taking medications as instructed, no medication side effects noted, no TIA's, no chest pain on exertion, no dyspnea on exertion, noting swelling of ankles.      New concerns: Pt sees Dr. Kia Bhatia with [facility] cardiologist for his pacemaker, with F/U soon for pacer evaluation, and a nephrologist for his CKD, F/U in 6 months. Pt thinks he has a hernia in his left groin area for the past month or so. Pt states that he tried to pick his wife up after a fall in November, and noticed the bulge shortly after the fall. Pt denies pain in the area. Pt states he did have a right sided inguinal hernia about 20 years ago with repair. PT is scheduled to see Dr. Yarelis Dover, surgeon in [place] for the issue. Key Antihyperlipidemia Meds             atorvastatin (LIPITOR) 40 mg tablet (Taking) Take 0.5 Tablets by mouth daily. Current Outpatient Medications   Medication Sig Dispense Refill    cholecalciferol (VITAMIN D3) (2,000 UNITS /50 MCG) cap capsule Take  by mouth daily.  bumetanide (BUMEX) 0.5 mg tablet Take 1 Tablet by mouth daily as needed (swelling). 30 Tablet 0    atorvastatin (LIPITOR) 40 mg tablet Take 0.5 Tablets by mouth daily. 45 Tablet 3    metoprolol succinate (TOPROL-XL) 50 mg XL tablet Take 50 mg by mouth daily.  allopurinoL (ZYLOPRIM) 100 mg tablet Take 1 Tablet by mouth daily. 90 Tablet 3    amLODIPine (NORVASC) 10 mg tablet Take 1 Tablet by mouth daily. 90 Tablet 3    losartan (COZAAR) 100 mg tablet Take 1 Tablet by mouth daily. 90 Tablet 3    rivaroxaban (XARELTO) 20 mg tab tablet Take  by mouth daily.          Lab Results   Component Value Date/Time    Cholesterol, total 148 08/19/2021 09:46 AM    HDL Cholesterol 49 08/19/2021 09:46 AM    LDL, calculated 74.2 08/19/2021 09:46 AM    Triglyceride 124 08/19/2021 09:46 AM    CHOL/HDL Ratio 3.0 08/19/2021 09:46 AM     Lab Results   Component Value Date/Time    Sodium 142 08/19/2021 09:46 AM    Potassium 3.6 08/19/2021 09:46 AM    Chloride 108 08/19/2021 09:46 AM    CO2 28 08/19/2021 09:46 AM    Anion gap 6 08/19/2021 09:46 AM    Glucose 100 (H) 08/19/2021 09:46 AM    BUN 17 08/19/2021 09:46 AM    Creatinine 1.53 (H) 08/19/2021 09:46 AM    BUN/Creatinine ratio 11 (L) 08/19/2021 09:46 AM    GFR est AA 53 (L) 08/19/2021 09:46 AM    GFR est non-AA 44 (L) 08/19/2021 09:46 AM    Calcium 8.4 (L) 08/19/2021 09:46 AM         Review of Systems, additional:  Pertinent items are noted in HPI. Objective:     Visit Vitals  /73 (BP 1 Location: Right upper arm, BP Patient Position: Sitting)   Pulse (!) 57   Temp 97.3 °F (36.3 °C) (Temporal)   Resp 16   Ht 5' 10\" (1.778 m)   Wt 235 lb (106.6 kg)   SpO2 96%   BMI 33.72 kg/m²       Lab Results   Component Value Date/Time    Sodium 142 08/19/2021 09:46 AM    Potassium 3.6 08/19/2021 09:46 AM    Chloride 108 08/19/2021 09:46 AM    CO2 28 08/19/2021 09:46 AM    Anion gap 6 08/19/2021 09:46 AM    Glucose 100 (H) 08/19/2021 09:46 AM    BUN 17 08/19/2021 09:46 AM    Creatinine 1.53 (H) 08/19/2021 09:46 AM    BUN/Creatinine ratio 11 (L) 08/19/2021 09:46 AM    GFR est AA 53 (L) 08/19/2021 09:46 AM    GFR est non-AA 44 (L) 08/19/2021 09:46 AM    Calcium 8.4 (L) 08/19/2021 09:46 AM     Lab Results   Component Value Date/Time    WBC 11.1 12/31/2020 08:37 AM    HGB 14.6 12/31/2020 08:37 AM    HCT 44.2 12/31/2020 08:37 AM    PLATELET 148 32/65/1488 08:37 AM    MCV 87.7 12/31/2020 08:37 AM     No results found for: HBA1C, IHB8AKJC, XYZ6NAAF, ECF4ITHJ  Lab Results   Component Value Date/Time    Cholesterol, total 148 08/19/2021 09:46 AM    HDL Cholesterol 49 08/19/2021 09:46 AM    LDL, calculated 74.2 08/19/2021 09:46 AM    VLDL, calculated 24.8 08/19/2021 09:46 AM    Triglyceride 124 08/19/2021 09:46 AM    CHOL/HDL Ratio 3.0 08/19/2021 09:46 AM       Appearance: alert, well appearing, and in no distress, oriented to person, place, and time and overweight. General exam BP noted to be well controlled today in office, S1, S2 normal, no gallop, no murmur, chest clear, no JVD, no HSM, no edema. Lab review: orders written for new lab studies as appropriate; see orders. Disclaimer:    I have discussed the diagnosis with the patient and the intended plan as seen above. The patient understands our medical plan.  The risks, benefits and significant side effects of all medications have been reviewed. Anticipated time course and progression of condition reviewed. All questions have been addressed. He received an after visit summary, with information reviewed, and questions answered. Where appropriate, he is instructed to call the clinic if he has not been notified either by phone or through [id number] with the results of his tests or with an appointment plan for any referrals within 1 week(s). The patient  is to call if his condition worsens or fails to improve or if significant side effects are experienced.      Graham Winkler NP     02/18/22

## 2022-02-18 NOTE — PROGRESS NOTES
Depression Screening:  3 most recent PHQ Screens 2/18/2022   Little interest or pleasure in doing things Not at all   Feeling down, depressed, irritable, or hopeless Not at all   Total Score PHQ 2 0       Learning Assessment:  Learning Assessment 3/23/2015   PRIMARY LEARNER Patient   HIGHEST LEVEL OF EDUCATION - PRIMARY LEARNER  -   PRIMARY LANGUAGE ENGLISH   LEARNER PREFERENCE PRIMARY LISTENING   ANSWERED BY patient   RELATIONSHIP SELF       Abuse Screening:  Abuse Screening Questionnaire 8/11/2016   Do you ever feel afraid of your partner? N   Are you in a relationship with someone who physically or mentally threatens you? N   Is it safe for you to go home? Y       Fall Risk  Fall Risk Assessment, last 12 mths 2/18/2022   Able to walk? Yes   Fall in past 12 months? 0   Do you feel unsteady? -   Are you worried about falling -       ADL  ADL Assessment 8/11/2016   Feeding yourself No Help Needed   Getting from bed to chair No Help Needed   Getting dressed No Help Needed   Bathing or showering No Help Needed   Walk across the room (includes cane/walker) No Help Needed   Using the telphone No Help Needed   Taking your medications No Help Needed   Preparing meals No Help Needed   Managing money (expenses/bills) No Help Needed   Moderately strenuous housework (laundry) No Help Needed   Shopping for personal items (toiletries/medicines) No Help Needed   Shopping for groceries No Help Needed   Driving No Help Needed   Climbing a flight of stairs No Help Needed   Getting to places beyond walking distances No Help Needed       Travel Screening:    Travel Screening     Question   Response    In the last month, have you been in contact with someone who was confirmed or suspected to have Coronavirus / COVID-19? No / Unsure    Have you had a COVID-19 viral test in the last 14 days? No    Do you have any of the following new or worsening symptoms?   None of these    Have you traveled internationally or domestically in the last month? No      Travel History   Travel since 01/18/22    No documented travel since 01/18/22         Health Maintenance reviewed and discussed and ordered per Provider. Health Maintenance Due   Topic Date Due    COVID-19 Vaccine (1) Never done    DTaP/Tdap/Td series (1 - Tdap) Never done    Shingrix Vaccine Age 50> (1 of 2) Never done    Flu Vaccine (1) 09/01/2021    Medicare Yearly Exam  01/01/2022   . Dominique Torres presents today for   Chief Complaint   Patient presents with    Follow-up     Gout    Hypertension    Cholesterol Problem       Is someone accompanying this pt? no    Is the patient using any DME equipment during OV? no    Coordination of Care:  1. Have you been to the ER, urgent care clinic since your last visit? Hospitalized since your last visit? no    2. Have you seen or consulted any other health care providers outside of the [address] [place] [address] since your last visit? Include any pap smears or colon screening.  no

## 2022-02-21 RX ORDER — BUMETANIDE 0.5 MG/1
TABLET ORAL
Qty: 90 TABLET | Refills: 0 | Status: SHIPPED | OUTPATIENT
Start: 2022-02-21 | End: 2022-08-19 | Stop reason: SDDI

## 2022-02-22 ENCOUNTER — HOSPITAL ENCOUNTER (OUTPATIENT)
Dept: ULTRASOUND IMAGING | Age: 82
Discharge: HOME | End: 2022-02-22
Attending: NURSE PRACTITIONER
Payer: MEDICARE

## 2022-02-22 DIAGNOSIS — R19.09 LEFT GROIN MASS: ICD-10-CM

## 2022-02-22 PROCEDURE — 76882 US LMTD JT/FCL EVL NVASC XTR: CPT

## 2022-03-18 PROBLEM — Z95.0 PACEMAKER: Status: ACTIVE | Noted: 2021-01-01

## 2022-05-18 NOTE — PROGRESS NOTES
IOP at goal. Continue all 3 drops for now. Discussed Martha Calero again today. Patient considering it. This is the Subsequent Medicare Annual Wellness Exam, performed 12 months or more after the Initial AWV or the last Subsequent AWV I have reviewed the patient's medical history in detail and updated the computerized patient record. History Past Medical History:  
Diagnosis Date  A-fib ([address]  Bladder neck contracture  Gout, unspecified  History of bladder cancer  Hypertension  Mixed hyperlipidemia  Obesity  Prostate cancer ([address]  Pulmonary embolus ([facility] 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  Venous insufficiency Past Surgical History:  
Procedure Laterality Date  HX COLECTOMY Right 5/2012  
 large polyps ([facility])  HX COLONOSCOPY  4/27/12  HX HERNIA REPAIR Right  HX KNEE REPLACEMENT Right 1/2016  HX ORTHOPAEDIC    
 hand  HX OTHER SURGICAL  2005  
 melanoma R arm  HX PROSTATECTOMY  2001 CA  
 HX UROLOGICAL  1990  
 bladder CA (TCC); TURB with XRT Current Outpatient Prescriptions Medication Sig Dispense Refill  amLODIPine (NORVASC) 10 mg tablet Take 1 Tab by mouth daily. 90 Tab 3  
 losartan-hydroCHLOROthiazide (HYZAAR) 100-12.5 mg per tablet Take 1 Tab by mouth daily. 90 Tab 3  
 atorvastatin (LIPITOR) 40 mg tablet 1/2 qd 45 Tab 3  
 rivaroxaban (XARELTO) 20 mg tab tablet Take 1 Tab by mouth daily. 90 Tab 3  
 allopurinol (ZYLOPRIM) 100 mg tablet Take 1 Tab by mouth daily. 90 Tab 4  
 atenolol (TENORMIN) 50 mg tablet Take  by mouth daily. Allergies Allergen Reactions  Vicodin [Hydrocodone-Acetaminophen] Swelling Family History Problem Relation Age of Onset  Heart Disease Father  Cancer Sister   
  breast  
 
Social History Substance Use Topics  Smoking status: Never Smoker  Smokeless tobacco: Never Used  Alcohol use 0.0 oz/week  
  0 Standard drinks or equivalent per week Patient Active Problem List  
Diagnosis Code  Pulmonary embolus (HCC) I26.99  
  DVT (deep venous thrombosis) (HCC) I82.409  Atrial fibrillation (HCC) I48.91  
 Essential hypertension I10  
 Obesity E66.9  Prostate cancer ([facility] C61  Mixed hyperlipidemia E78.2  
 Gout, unspecified M10.9  Advance directive discussed with patient Z70.80 Depression Risk Factor Screening: PHQ over the last two weeks 4/13/2018 Little interest or pleasure in doing things Not at all Feeling down, depressed, irritable, or hopeless Not at all Total Score PHQ 2 0 Alcohol Risk Factor Screening: You do not drink alcohol or very rarely. Functional Ability and Level of Safety:  
Hearing Loss Hearing is good. Activities of Daily Living The home contains: no safety equipment. Patient does total self care Fall Risk Fall Risk Assessment, last 12 mths 4/13/2018 Able to walk? Yes Fall in past 12 months? No  
 
 
Abuse Screen Patient is not abused Cognitive Screening Evaluation of Cognitive Function: 
Has your family/caregiver stated any concerns about your memory: no 
Normal 
 
Patient Care Team  
Patient Care Team: 
Claudia Luong MD as PCP - General (Internal Medicine) Laura Leroy MD (Vascular Surgery) Assessment/Plan Education and counseling provided: 
Are appropriate based on today's review and evaluation Diagnoses and all orders for this visit: 1. Routine general medical examination at a health care facility 2. Essential hypertension 3. Mixed hyperlipidemia 4. Paroxysmal atrial fibrillation (HCC) 5. Encounter for immunization -     Influenza Vaccine Inactivated (IIV)(FLUAD), Subunit, Adjuvanted, IM, ([id number]) Health Maintenance Due Topic Date Due  
 DTaP/Tdap/Td series (1 - Tdap) 04/03/1961  Shingrix Vaccine Age 50> (1 of 2) 04/03/1990  GLAUCOMA SCREENING Q2Y  04/03/2005  Influenza Age 5 to Adult  08/01/2018  MEDICARE YEARLY EXAM  10/07/2018 Medicare wellness performed Labs soon to assess metabolic parameters Continue dietary/exercise efforts Kane advised Vaccines: flu vaccine advised Advance directive discussed PROGRESS NOTE 
 
HPI:  
F/u visit for routine evaluation of HTN, PAF, hyperlipidemia Chol 136 April 2018 
w/o chest pain/abd. discomfort; no dyspnea, cough or increased pedal edema; denies constitutional complaints of fever, night sweats or wt loss; no evidence of GI/ hemorrhage ROS is otherwise negative. Past Medical History:  
Diagnosis Date  A-fib ([facility]  Bladder neck contracture  Gout, unspecified  History of bladder cancer  Hypertension  Mixed hyperlipidemia  Obesity  Prostate cancer ([facility]  [facility] ([facility] 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  Venous insufficiency Past Surgical History:  
Procedure Laterality Date  HX COLECTOMY Right 5/2012  
 large polyps ([facility])  HX COLONOSCOPY  4/27/12  HX HERNIA REPAIR Right  HX KNEE REPLACEMENT Right 1/2016  HX ORTHOPAEDIC    
 hand  HX OTHER SURGICAL  2005  
 melanoma R arm  HX PROSTATECTOMY  2001 CA  
 HX UROLOGICAL  1990  
 bladder CA (TCC); TURB with XRT Social History Social History  Marital status:  Spouse name: N/A  
 Number of children: N/A  
 Years of education: N/A Occupational History  Not on file. Social History Main Topics  Smoking status: Never Smoker  Smokeless tobacco: Never Used  Alcohol use 0.0 oz/week  
  0 Standard drinks or equivalent per week  Drug use: No  
 Sexual activity: No  
 
Other Topics Concern  Not on file Social History Narrative Allergies Allergen Reactions  Vicodin [Hydrocodone-Acetaminophen] Swelling Family History Problem Relation Age of Onset  Heart Disease Father  Cancer Sister   
  breast  
 
 
Current Outpatient Prescriptions Medication Sig Dispense Refill  amLODIPine (NORVASC) 10 mg tablet Take 1 Tab by mouth daily.  90 Tab 3  
  losartan-hydroCHLOROthiazide (HYZAAR) 100-12.5 mg per tablet Take 1 Tab by mouth daily. 90 Tab 3  
 atorvastatin (LIPITOR) 40 mg tablet 1/2 qd 45 Tab 3  
 rivaroxaban (XARELTO) 20 mg tab tablet Take 1 Tab by mouth daily. 90 Tab 3  
 allopurinol (ZYLOPRIM) 100 mg tablet Take 1 Tab by mouth daily. 90 Tab 4  
 atenolol (TENORMIN) 50 mg tablet Take  by mouth daily. Visit Vitals  /75 (BP 1 Location: Left arm, BP Patient Position: Sitting)  Pulse 65  Temp 96.5 °F (35.8 °C) (Tympanic)  Resp 16  
 Ht 5' 10\" (1.778 m)  Wt 234 lb (106.1 kg)  SpO2 95%  BMI 33.58 kg/m2 PE Well nourished in NAD HEENT:  OP: clear. Neck: supple w/o mass or bruits. Chest: clear. CV: RRR w/o m,r,g; pulses NP distal legs Abd: soft, NT, w/o HSM or mass. Ext: tr-1+ edema. Neuro: NF. Assessment and Plan Encounter Diagnoses Name Primary?  Routine general medical examination at a health care facility Yes  Essential hypertension  Mixed hyperlipidemia  Paroxysmal atrial fibrillation (HCC)  Encounter for immunization BP @ goal 
Labs to assess metabolic parameters (hyperlipidemia) PAF - anticoagulated with xarelto Continue dietary/exercise efforts No change in rx OV 6 mos or prn I have explained plan to patient and the patient verbalizes understanding Discussed the patient's BMI with him. The BMI follow up plan is as follows:  
 
dietary management education, guidance, and counseling 
encourage exercise 
monitor weight 
prescribed dietary intake An After Visit Summary was printed and given to the patient.

## 2022-07-01 ENCOUNTER — ANESTHESIA EVENT (OUTPATIENT)
Dept: SURGERY | Age: 82
End: 2022-07-01
Payer: MEDICARE

## 2022-07-01 RX ORDER — INSULIN LISPRO 100 [IU]/ML
INJECTION, SOLUTION INTRAVENOUS; SUBCUTANEOUS ONCE
Status: CANCELLED | OUTPATIENT
Start: 2022-07-01 | End: 2022-07-01

## 2022-07-06 ENCOUNTER — HOSPITAL ENCOUNTER (OUTPATIENT)
Age: 82
Setting detail: OUTPATIENT SURGERY
Discharge: HOME | End: 2022-07-06
Attending: SURGERY | Admitting: SURGERY
Payer: MEDICARE

## 2022-07-06 ENCOUNTER — ANESTHESIA (OUTPATIENT)
Dept: SURGERY | Age: 82
End: 2022-07-06
Payer: MEDICARE

## 2022-07-06 VITALS
SYSTOLIC BLOOD PRESSURE: 106 MMHG | HEART RATE: 66 BPM | TEMPERATURE: 97.1 F | WEIGHT: 231 LBS | DIASTOLIC BLOOD PRESSURE: 67 MMHG | BODY MASS INDEX: 33.07 KG/M2 | RESPIRATION RATE: 14 BRPM | OXYGEN SATURATION: 98 % | HEIGHT: 70 IN

## 2022-07-06 DIAGNOSIS — I87.2 VENOUS INSUFFICIENCY: Primary | ICD-10-CM

## 2022-07-06 PROCEDURE — C1781 MESH (IMPLANTABLE): HCPCS | Performed by: SURGERY

## 2022-07-06 PROCEDURE — 77030010509 HC AIRWY LMA MSK TELE -A: Performed by: NURSE ANESTHETIST, CERTIFIED REGISTERED

## 2022-07-06 PROCEDURE — 77030018875 HC APPL CLP LIG4 J&J -B: Performed by: SURGERY

## 2022-07-06 PROCEDURE — 74011000258 HC RX REV CODE- 258: Performed by: NURSE ANESTHETIST, CERTIFIED REGISTERED

## 2022-07-06 PROCEDURE — 77030013189 HC SLV COMPR SCD HUNT -B: Performed by: SURGERY

## 2022-07-06 PROCEDURE — 2709999900 HC NON-CHARGEABLE SUPPLY: Performed by: SURGERY

## 2022-07-06 PROCEDURE — 76060000033 HC ANESTHESIA 1 TO 1.5 HR: Performed by: SURGERY

## 2022-07-06 PROCEDURE — 74011000272 HC RX REV CODE- 272: Performed by: SURGERY

## 2022-07-06 PROCEDURE — 76010000149 HC OR TIME 1 TO 1.5 HR: Performed by: SURGERY

## 2022-07-06 PROCEDURE — 74011000258 HC RX REV CODE- 258: Performed by: SURGERY

## 2022-07-06 PROCEDURE — 77030041614 HC WRP CLD THER BREG -B: Performed by: SURGERY

## 2022-07-06 PROCEDURE — 77030020268 HC MISC GENERAL SUPPLY: Performed by: SURGERY

## 2022-07-06 PROCEDURE — 74011000250 HC RX REV CODE- 250: Performed by: NURSE ANESTHETIST, CERTIFIED REGISTERED

## 2022-07-06 PROCEDURE — 77030013079 HC BLNKT BAIR HGGR 3M -A: Performed by: NURSE ANESTHETIST, CERTIFIED REGISTERED

## 2022-07-06 PROCEDURE — 74011250636 HC RX REV CODE- 250/636: Performed by: NURSE ANESTHETIST, CERTIFIED REGISTERED

## 2022-07-06 PROCEDURE — 77030012406 HC DRN WND PENRS BARD -A: Performed by: SURGERY

## 2022-07-06 PROCEDURE — 74011000250 HC RX REV CODE- 250: Performed by: SURGERY

## 2022-07-06 PROCEDURE — 77030011267 HC ELECTRD BLD COVD -A: Performed by: SURGERY

## 2022-07-06 PROCEDURE — 76210000021 HC REC RM PH II 0.5 TO 1 HR: Performed by: SURGERY

## 2022-07-06 PROCEDURE — 76210000063 HC OR PH I REC FIRST 0.5 HR: Performed by: SURGERY

## 2022-07-06 PROCEDURE — 77030008463 HC STPLR SKN PROX J&J -B: Performed by: SURGERY

## 2022-07-06 PROCEDURE — 77030002986 HC SUT PROL J&J -A: Performed by: SURGERY

## 2022-07-06 PROCEDURE — 74011250636 HC RX REV CODE- 250/636: Performed by: SURGERY

## 2022-07-06 PROCEDURE — 77030002933 HC SUT MCRYL J&J -A: Performed by: SURGERY

## 2022-07-06 DEVICE — MESH HERN L W1.6XL1.9IN INGUINAL WHT POLYPR MFIL PLUG PTCH: Type: IMPLANTABLE DEVICE | Site: INGUINAL | Status: FUNCTIONAL

## 2022-07-06 RX ORDER — ONDANSETRON 2 MG/ML
4 INJECTION INTRAMUSCULAR; INTRAVENOUS ONCE
Status: DISCONTINUED | OUTPATIENT
Start: 2022-07-06 | End: 2022-07-06 | Stop reason: HOSPADM

## 2022-07-06 RX ORDER — SUCCINYLCHOLINE CHLORIDE 20 MG/ML INJECTION SOLUTION
SOLUTION AS NEEDED
Status: DISCONTINUED | OUTPATIENT
Start: 2022-07-06 | End: 2022-07-06 | Stop reason: HOSPADM

## 2022-07-06 RX ORDER — SODIUM CHLORIDE, SODIUM LACTATE, POTASSIUM CHLORIDE, CALCIUM CHLORIDE 600; 310; 30; 20 MG/100ML; MG/100ML; MG/100ML; MG/100ML
25 INJECTION, SOLUTION INTRAVENOUS CONTINUOUS
Status: DISCONTINUED | OUTPATIENT
Start: 2022-07-06 | End: 2022-07-06 | Stop reason: HOSPADM

## 2022-07-06 RX ORDER — FENTANYL CITRATE 50 UG/ML
50 INJECTION, SOLUTION INTRAMUSCULAR; INTRAVENOUS AS NEEDED
Status: DISCONTINUED | OUTPATIENT
Start: 2022-07-06 | End: 2022-07-06 | Stop reason: HOSPADM

## 2022-07-06 RX ORDER — FLUMAZENIL 0.1 MG/ML
0.2 INJECTION INTRAVENOUS
Status: DISCONTINUED | OUTPATIENT
Start: 2022-07-06 | End: 2022-07-06 | Stop reason: HOSPADM

## 2022-07-06 RX ORDER — NALOXONE HYDROCHLORIDE 0.4 MG/ML
0.1 INJECTION, SOLUTION INTRAMUSCULAR; INTRAVENOUS; SUBCUTANEOUS
Status: DISCONTINUED | OUTPATIENT
Start: 2022-07-06 | End: 2022-07-06 | Stop reason: HOSPADM

## 2022-07-06 RX ORDER — SODIUM CHLORIDE 0.9 % (FLUSH) 0.9 %
5-40 SYRINGE (ML) INJECTION AS NEEDED
Status: DISCONTINUED | OUTPATIENT
Start: 2022-07-06 | End: 2022-07-06 | Stop reason: HOSPADM

## 2022-07-06 RX ORDER — FENTANYL CITRATE 50 UG/ML
INJECTION, SOLUTION INTRAMUSCULAR; INTRAVENOUS AS NEEDED
Status: DISCONTINUED | OUTPATIENT
Start: 2022-07-06 | End: 2022-07-06 | Stop reason: HOSPADM

## 2022-07-06 RX ORDER — LIDOCAINE HYDROCHLORIDE 40 MG/ML
SOLUTION TOPICAL AS NEEDED
Status: DISCONTINUED | OUTPATIENT
Start: 2022-07-06 | End: 2022-07-06 | Stop reason: HOSPADM

## 2022-07-06 RX ORDER — SODIUM CHLORIDE 0.9 % (FLUSH) 0.9 %
5-40 SYRINGE (ML) INJECTION EVERY 8 HOURS
Status: DISCONTINUED | OUTPATIENT
Start: 2022-07-06 | End: 2022-07-06 | Stop reason: HOSPADM

## 2022-07-06 RX ORDER — DIPHENHYDRAMINE HYDROCHLORIDE 50 MG/ML
12.5 INJECTION, SOLUTION INTRAMUSCULAR; INTRAVENOUS
Status: DISCONTINUED | OUTPATIENT
Start: 2022-07-06 | End: 2022-07-06 | Stop reason: HOSPADM

## 2022-07-06 RX ORDER — DEXAMETHASONE SODIUM PHOSPHATE 4 MG/ML
INJECTION, SOLUTION INTRA-ARTICULAR; INTRALESIONAL; INTRAMUSCULAR; INTRAVENOUS; SOFT TISSUE AS NEEDED
Status: DISCONTINUED | OUTPATIENT
Start: 2022-07-06 | End: 2022-07-06 | Stop reason: HOSPADM

## 2022-07-06 RX ORDER — BUPIVACAINE HYDROCHLORIDE 2.5 MG/ML
INJECTION, SOLUTION INFILTRATION; PERINEURAL AS NEEDED
Status: DISCONTINUED | OUTPATIENT
Start: 2022-07-06 | End: 2022-07-06 | Stop reason: HOSPADM

## 2022-07-06 RX ORDER — ROCURONIUM BROMIDE 10 MG/ML
INJECTION, SOLUTION INTRAVENOUS AS NEEDED
Status: DISCONTINUED | OUTPATIENT
Start: 2022-07-06 | End: 2022-07-06 | Stop reason: HOSPADM

## 2022-07-06 RX ORDER — LIDOCAINE HYDROCHLORIDE 20 MG/ML
INJECTION, SOLUTION EPIDURAL; INFILTRATION; INTRACAUDAL; PERINEURAL AS NEEDED
Status: DISCONTINUED | OUTPATIENT
Start: 2022-07-06 | End: 2022-07-06 | Stop reason: HOSPADM

## 2022-07-06 RX ORDER — PROPOFOL 10 MG/ML
INJECTION, EMULSION INTRAVENOUS AS NEEDED
Status: DISCONTINUED | OUTPATIENT
Start: 2022-07-06 | End: 2022-07-06 | Stop reason: HOSPADM

## 2022-07-06 RX ORDER — ONDANSETRON 2 MG/ML
INJECTION INTRAMUSCULAR; INTRAVENOUS AS NEEDED
Status: DISCONTINUED | OUTPATIENT
Start: 2022-07-06 | End: 2022-07-06 | Stop reason: HOSPADM

## 2022-07-06 RX ADMIN — LIDOCAINE HYDROCHLORIDE 3 ML: 40 SOLUTION TOPICAL at 08:44

## 2022-07-06 RX ADMIN — ROCURONIUM BROMIDE 10 MG: 50 INJECTION, SOLUTION INTRAVENOUS at 09:01

## 2022-07-06 RX ADMIN — SUGAMMADEX 400 MG: 100 INJECTION, SOLUTION INTRAVENOUS at 09:24

## 2022-07-06 RX ADMIN — ONDANSETRON HYDROCHLORIDE 4 MG: 2 INJECTION, SOLUTION INTRAMUSCULAR; INTRAVENOUS at 08:20

## 2022-07-06 RX ADMIN — DEXAMETHASONE SODIUM PHOSPHATE 10 MG: 4 INJECTION, SOLUTION INTRAMUSCULAR; INTRAVENOUS at 08:20

## 2022-07-06 RX ADMIN — PHENYLEPHRINE HYDROCHLORIDE 200 MCG: 10 INJECTION INTRAVENOUS at 08:15

## 2022-07-06 RX ADMIN — FENTANYL CITRATE 50 MCG: 50 INJECTION, SOLUTION INTRAMUSCULAR; INTRAVENOUS at 08:23

## 2022-07-06 RX ADMIN — PROPOFOL 150 MG: 10 INJECTION, EMULSION INTRAVENOUS at 08:15

## 2022-07-06 RX ADMIN — SODIUM CHLORIDE, POTASSIUM CHLORIDE, SODIUM LACTATE AND CALCIUM CHLORIDE: 600; 310; 30; 20 INJECTION, SOLUTION INTRAVENOUS at 08:11

## 2022-07-06 RX ADMIN — ROCURONIUM BROMIDE 20 MG: 50 INJECTION, SOLUTION INTRAVENOUS at 08:45

## 2022-07-06 RX ADMIN — Medication 160 MG: at 08:42

## 2022-07-06 RX ADMIN — LIDOCAINE HYDROCHLORIDE 100 MG: 20 INJECTION, SOLUTION EPIDURAL; INFILTRATION; INTRACAUDAL; PERINEURAL at 08:15

## 2022-07-06 RX ADMIN — FENTANYL CITRATE 50 MCG: 50 INJECTION, SOLUTION INTRAMUSCULAR; INTRAVENOUS at 08:21

## 2022-07-06 RX ADMIN — CEFAZOLIN 2 G: 2 INJECTION, POWDER, FOR SOLUTION INTRAMUSCULAR; INTRAVENOUS at 08:19

## 2022-07-06 RX ADMIN — PHENYLEPHRINE HYDROCHLORIDE 100 MCG: 10 INJECTION INTRAVENOUS at 08:23

## 2022-07-06 RX ADMIN — PROPOFOL 50 MG: 10 INJECTION, EMULSION INTRAVENOUS at 08:24

## 2022-07-06 NOTE — DISCHARGE INSTRUCTIONS
Dr. Deirdre Gitelman Surgery Discharge Instructions:    1. Keep dressing clean dry and intact for 72 hrs.   2. In 72 hrs, remove dressing and you may shower with soap and water. Pat incision dry. Keep incision clean and dry. 3. You may take Tylenol as needed for pain. 4. No heavy lifting for 6 weeks to allow healing and prevent recurrence of hernia. 5. No driving for 24 hrs after anesthesia.

## 2022-07-06 NOTE — ANESTHESIA POSTPROCEDURE EVALUATION
Procedure(s):  LEFT INGUINAL HERNIA REPAIR. general    Anesthesia Post Evaluation      Multimodal analgesia: multimodal analgesia used between 6 hours prior to anesthesia start to PACU discharge  Patient location during evaluation: PACU  Patient participation: complete - patient participated  Level of consciousness: awake and alert  Pain management: adequate  Airway patency: patent  Anesthetic complications: no  Cardiovascular status: acceptable  Respiratory status: acceptable  Hydration status: acceptable  Comments: Ok to discharge when standard criteria are met.    Post anesthesia nausea and vomiting:  none  Final Post Anesthesia Temperature Assessment:  Normothermia (36.0-37.5 degrees C)      INITIAL Post-op Vital signs:   Vitals Value Taken Time   /73 07/06/22 0934   Temp 36.7 °C (98 °F) 07/06/22 0934   Pulse 62 07/06/22 0934   Resp 14 07/06/22 0934   SpO2 94 % 07/06/22 0934

## 2022-07-06 NOTE — ANESTHESIA PREPROCEDURE EVALUATION
Relevant Problems   CARDIOVASCULAR   (+) A-fib (HCC)   (+) Atrial fibrillation (HCC)   (+) Essential hypertension   (+) Hypertension   (+) Pacemaker      ENDOCRINE   (+) Obesity      PERSONAL HX & FAMILY HX OF CANCER   (+) Prostate cancer (HCC)       Anesthetic History   No history of anesthetic complications            Review of Systems / Medical History  Patient summary reviewed, nursing notes reviewed and pertinent labs reviewed    Pulmonary  Within defined limits                 Neuro/Psych   Within defined limits           Cardiovascular    Hypertension        Dysrhythmias : atrial fibrillation  Pacemaker and hyperlipidemia    Exercise tolerance: >4 METS  Comments: DVT w/ PE in 2001 & 2014; last dose of Xarelto 7/3/22   GI/Hepatic/Renal         Renal disease: CRI       Endo/Other        Obesity     Other Findings            Physical Exam    Airway  Mallampati: II  TM Distance: 4 - 6 cm  Neck ROM: normal range of motion   Mouth opening: Normal     Cardiovascular  Regular rate and rhythm,  S1 and S2 normal,  no murmur, click, rub, or gallop  Rhythm: regular  Rate: normal         Dental    Dentition: Upper partial plate     Pulmonary  Breath sounds clear to auscultation               Abdominal  GI exam deferred       Other Findings            Anesthetic Plan    ASA: 3  Anesthesia type: general          Induction: Intravenous  Anesthetic plan and risks discussed with: Patient

## 2022-07-11 NOTE — OP NOTES
Operative Note    Patient: Justine Raymond. YOB: 1940  MRN: [id number]    Date of Procedure: 7/6/2022     Pre-Op Diagnosis:  Incarcerated left inguinal hernia     Post-Op Diagnosis:  Incarcerated left pantaloon inguinal hernia     Procedure(s):   Reduction and repair of left angle hernia with Marlex mesh and plug      Surgeon(s): Rina Cardenas MD     Assistant:  [facility] Lydia    Blood loss:  Less than 25    Drains:  None     Specimens:  None     Operative indications:  Large incarcerated hernia     Operative procedure: The patient was placed in the supine position. General anesthesia was administered. Timeout was observed. The patient had been on Avelox pre-operatively. The abdomen was prepped and draped. The abdomen was addressed making a by skin incision in the left lower abdominal quadrant. The skin and subcutaneous tissues were sharply opened with a scalpel. The fascia of the aponeurosis was attenuated and thin. The fascia was opened. The ilioinguinal was identified. Here there was a large indirect inguinal hernia sac containing portions of omentum and colon. This was reduced to an intraperitoneal position. The sac was reduced and reduced to intraperitoneal position as well. There was also evidence of a direct inguinal hernia at the iliopubic floor which was completely disrupted. The floor was repaired utilizing interrupted 0 Prolene to reapproximate the transversalis fascia to Poupart's ligament inferiorly. The lateral aspect of the floor was repaired utilizing a Marlex mesh plug dunked laterally to the deep inguinal ring of the transversalis fascia. The internal abdominal oblique was closed over the plug with interrupted 0 Prolene. The floor was further strengthened with a keyhole Marlex mesh. This again was sewn in place to the same structures with running 0 Prolene.   The tails of the mesh were brought superior and inferior to the deep inguinal ring and wrapped about the cord. The cord was allowed to return to his normal resting position. The ileal loop was returned to the normal resting position. The aproneurosis of the fascia was reapproximated with Monocryl. Susana's fascia was closed with Monocryl. The skin was closed with running subcutaneous 3-0 and 4-0 Monocryl. A sterile dressing was placed over the incision site. Upon completion of the procedure gentle traction was placed on the right and the left testicle to assure that they were deep within the scrotum. The patient was awakened from general anesthesia and extubated. He was transported from the operating room to the recovery room in stable condition.     Electronically Signed by Yue Parker MD on 7/11/2022 at 9:12 AM

## 2022-08-19 ENCOUNTER — OFFICE VISIT (OUTPATIENT)
Dept: FAMILY MEDICINE CLINIC | Age: 82
End: 2022-08-19
Payer: MEDICARE

## 2022-08-19 VITALS
RESPIRATION RATE: 16 BRPM | TEMPERATURE: 98.2 F | SYSTOLIC BLOOD PRESSURE: 116 MMHG | OXYGEN SATURATION: 97 % | BODY MASS INDEX: 33.36 KG/M2 | WEIGHT: 233 LBS | HEART RATE: 95 BPM | HEIGHT: 70 IN | DIASTOLIC BLOOD PRESSURE: 76 MMHG

## 2022-08-19 DIAGNOSIS — I10 ESSENTIAL HYPERTENSION: Primary | ICD-10-CM

## 2022-08-19 DIAGNOSIS — E78.2 MIXED HYPERLIPIDEMIA: ICD-10-CM

## 2022-08-19 DIAGNOSIS — N18.31 STAGE 3A CHRONIC KIDNEY DISEASE (HCC): ICD-10-CM

## 2022-08-19 DIAGNOSIS — Z13.1 SCREENING FOR DIABETES MELLITUS: ICD-10-CM

## 2022-08-19 DIAGNOSIS — M10.9 GOUTY ARTHRITIS OF RIGHT GREAT TOE: ICD-10-CM

## 2022-08-19 PROBLEM — N18.30 CHRONIC RENAL DISEASE, STAGE III (HCC): Status: ACTIVE | Noted: 2022-08-19

## 2022-08-19 PROCEDURE — 1101F PT FALLS ASSESS-DOCD LE1/YR: CPT | Performed by: NURSE PRACTITIONER

## 2022-08-19 PROCEDURE — G8754 DIAS BP LESS 90: HCPCS | Performed by: NURSE PRACTITIONER

## 2022-08-19 PROCEDURE — 99213 OFFICE O/P EST LOW 20 MIN: CPT | Performed by: NURSE PRACTITIONER

## 2022-08-19 PROCEDURE — 1123F ACP DISCUSS/DSCN MKR DOCD: CPT | Performed by: NURSE PRACTITIONER

## 2022-08-19 PROCEDURE — G8427 DOCREV CUR MEDS BY ELIG CLIN: HCPCS | Performed by: NURSE PRACTITIONER

## 2022-08-19 PROCEDURE — G8536 NO DOC ELDER MAL SCRN: HCPCS | Performed by: NURSE PRACTITIONER

## 2022-08-19 PROCEDURE — G8417 CALC BMI ABV UP PARAM F/U: HCPCS | Performed by: NURSE PRACTITIONER

## 2022-08-19 PROCEDURE — G8752 SYS BP LESS 140: HCPCS | Performed by: NURSE PRACTITIONER

## 2022-08-19 PROCEDURE — G0463 HOSPITAL OUTPT CLINIC VISIT: HCPCS | Performed by: NURSE PRACTITIONER

## 2022-08-19 PROCEDURE — G8432 DEP SCR NOT DOC, RNG: HCPCS | Performed by: NURSE PRACTITIONER

## 2022-08-19 NOTE — PROGRESS NOTES
1. \"Have you been to the ER, urgent care clinic since your last visit? Hospitalized since your last visit? \" No    2. \"Have you seen or consulted any other health care providers outside of the [address] [place] [address] since your last visit? \" No     3. For patients aged 39-70: Has the patient had a colonoscopy / FIT/ Cologuard? NA - based on age      If the patient is female:    4. For patients aged 41-77: Has the patient had a mammogram within the past 2 years? NA - based on age or sex      11. For patients aged 21-65: Has the patient had a pap smear?  NA - based on age or sex

## 2022-08-19 NOTE — PROGRESS NOTES
Hypertension      Assessment/Plan:     hypertension stable, needs to follow diet more regularly, hyperlipidemia stable, needs to follow diet more regularly. lab results and schedule of future lab studies reviewed with patient  orders and follow up as documented in patient record  reviewed diet, exercise and weight control  recommended sodium restriction  copy of written low fat low cholesterol diet provided and reviewed  cardiovascular risk and specific lipid/LDL goals reviewed. Diagnoses and all orders for this visit:    1. Essential hypertension  -     METABOLIC PANEL, BASIC; Future    2. Mixed hyperlipidemia  -     LIPID PANEL; Future  -     ALT; Future  -     AST; Future    3. Stage 3a chronic kidney disease ([facility] [address]    4. Gouty arthritis of right great toe  -     URIC ACID; Future    5. Screening for diabetes mellitus       Follow-up and Dispositions    Return in about 6 months (around 2/19/2023) for High Cholesterol, Hypertension, gout, Annual Wellness Visit, (In Office), lab appointment for soon. Subjective:     Rossie Cabot is a 80 y.o. WHITE/NON- male who presents for follow-up of hypertension. Pt denies any SOB, edema, CP, orthopnea, palpitations, nocturnal dyspnea, headaches, or blurred vision. Diet and Lifestyle: generally follows a low fat low cholesterol diet, generally follows a low sodium diet  Home BP Monitoring: is not measured at home    Cardiovascular ROS: taking medications as instructed, no medication side effects noted, no TIA's, no chest pain on exertion, no dyspnea on exertion, no swelling of ankles. New concerns: hernia repair 6 weeks post op, ablation with cardiology for soon. Key Antihyperlipidemia Meds               atorvastatin (LIPITOR) 40 mg tablet (Taking) Take 0.5 Tablets by mouth daily. Endocrine Review  He is seen for gout. Since last visit: 2/18/2022. He reports medication compliance: compliant all of the time.   Medication side effects: none. Lab review: labs reviewed and discussed with patient. No results found for: URICA, UAU1    Current Outpatient Medications   Medication Sig Dispense Refill    cholecalciferol (VITAMIN D3) (2,000 UNITS /50 MCG) cap capsule Take  by mouth daily. atorvastatin (LIPITOR) 40 mg tablet Take 0.5 Tablets by mouth daily. 45 Tablet 3    metoprolol succinate (TOPROL-XL) 50 mg XL tablet Take 50 mg by mouth daily. allopurinoL (ZYLOPRIM) 100 mg tablet Take 1 Tablet by mouth daily. 90 Tablet 3    amLODIPine (NORVASC) 10 mg tablet Take 1 Tablet by mouth daily. 90 Tablet 3    losartan (COZAAR) 100 mg tablet Take 1 Tablet by mouth daily. 90 Tablet 3    rivaroxaban (XARELTO) 20 mg tab tablet Take  by mouth daily. Review of Systems, additional:  Pertinent items are noted in HPI.     Objective:   Visit Vitals  /76 (BP 1 Location: Left upper arm, BP Patient Position: Sitting)   Pulse 95   Temp 98.2 °F (36.8 °C) (Temporal)   Resp 16   Ht 5' 10\" (1.778 m)   Wt 233 lb (105.7 kg)   SpO2 97%   BMI 33.43 kg/m²       Lab Results   Component Value Date/Time    Sodium 142 08/19/2021 09:46 AM    Potassium 3.6 08/19/2021 09:46 AM    Chloride 108 08/19/2021 09:46 AM    CO2 28 08/19/2021 09:46 AM    Anion gap 6 08/19/2021 09:46 AM    Glucose 100 (H) 08/19/2021 09:46 AM    BUN 17 08/19/2021 09:46 AM    Creatinine 1.53 (H) 08/19/2021 09:46 AM    BUN/Creatinine ratio 11 (L) 08/19/2021 09:46 AM    GFR est AA 53 (L) 08/19/2021 09:46 AM    GFR est non-AA 44 (L) 08/19/2021 09:46 AM    Calcium 8.4 (L) 08/19/2021 09:46 AM     Lab Results   Component Value Date/Time    WBC 11.1 12/31/2020 08:37 AM    HGB 14.6 12/31/2020 08:37 AM    HCT 44.2 12/31/2020 08:37 AM    PLATELET 799 80/23/2080 08:37 AM    MCV 87.7 12/31/2020 08:37 AM     No results found for: HBA1C, XJC9CIYH, PSZ3BOWL, SXK7KOET  Lab Results   Component Value Date/Time    Cholesterol, total 148 08/19/2021 09:46 AM    HDL Cholesterol 49 08/19/2021 09:46 AM    LDL, calculated 74.2 08/19/2021 09:46 AM    VLDL, calculated 24.8 08/19/2021 09:46 AM    Triglyceride 124 08/19/2021 09:46 AM    CHOL/HDL Ratio 3.0 08/19/2021 09:46 AM       Appearance: alert, well appearing, and in no distress and oriented to person, place, and time. General exam BP noted to be well controlled today in office, S1, S2 normal, no gallop, no murmur, chest clear, no JVD, no HSM, no edema. Lab review: orders written for new lab studies as appropriate; see orders. Disclaimer:    I have discussed the diagnosis with the patient and the intended plan as seen above. The patient understands our medical plan. The risks, benefits and significant side effects of all medications have been reviewed. Anticipated time course and progression of condition reviewed. All questions have been addressed. He received an after visit summary, with information reviewed, and questions answered. Where appropriate, he is instructed to call the clinic if he has not been notified either by phone or through [address] with the results of his tests or with an appointment plan for any referrals within 1 week(s). The patient  is to call if his condition worsens or fails to improve or if significant side effects are experienced.      Magda Moreland NP     08/22/22

## 2022-08-24 ENCOUNTER — HOSPITAL ENCOUNTER (OUTPATIENT)
Dept: LAB | Age: 82
Discharge: HOME | End: 2022-08-24
Payer: MEDICARE

## 2022-08-24 ENCOUNTER — APPOINTMENT (OUTPATIENT)
Dept: FAMILY MEDICINE CLINIC | Age: 82
End: 2022-08-24

## 2022-08-24 DIAGNOSIS — E78.2 MIXED HYPERLIPIDEMIA: ICD-10-CM

## 2022-08-24 DIAGNOSIS — M10.9 GOUTY ARTHRITIS OF RIGHT GREAT TOE: ICD-10-CM

## 2022-08-24 DIAGNOSIS — I10 ESSENTIAL HYPERTENSION: ICD-10-CM

## 2022-08-24 LAB
ALT SERPL-CCNC: 13 U/L (ref 16–61)
ANION GAP SERPL CALC-SCNC: 8 MMOL/L (ref 3–18)
AST SERPL-CCNC: 14 U/L (ref 10–38)
BUN SERPL-MCNC: 20 MG/DL (ref 7–18)
BUN/CREAT SERPL: 11 (ref 12–20)
CALCIUM SERPL-MCNC: 9.4 MG/DL (ref 8.5–10.1)
CHLORIDE SERPL-SCNC: 107 MMOL/L (ref 100–111)
CHOLEST SERPL-MCNC: 136 MG/DL
CO2 SERPL-SCNC: 28 MMOL/L (ref 21–32)
CREAT SERPL-MCNC: 1.74 MG/DL (ref 0.6–1.3)
GLUCOSE SERPL-MCNC: 101 MG/DL (ref 74–99)
HDLC SERPL-MCNC: 49 MG/DL (ref 40–60)
HDLC SERPL: 2.8 {RATIO} (ref 0–5)
LDLC SERPL CALC-MCNC: 69 MG/DL (ref 0–100)
LIPID PROFILE,FLP: NORMAL
POTASSIUM SERPL-SCNC: 4.3 MMOL/L (ref 3.5–5.5)
SODIUM SERPL-SCNC: 143 MMOL/L (ref 136–145)
TRIGL SERPL-MCNC: 90 MG/DL (ref ?–150)
URATE SERPL-MCNC: 6 MG/DL (ref 2.6–7.2)
VLDLC SERPL CALC-MCNC: 18 MG/DL

## 2022-08-24 PROCEDURE — 36415 COLL VENOUS BLD VENIPUNCTURE: CPT

## 2022-08-24 PROCEDURE — 84550 ASSAY OF BLOOD/URIC ACID: CPT

## 2022-08-24 PROCEDURE — 84460 ALANINE AMINO (ALT) (SGPT): CPT

## 2022-08-24 PROCEDURE — 80061 LIPID PANEL: CPT

## 2022-08-24 PROCEDURE — 80048 BASIC METABOLIC PNL TOTAL CA: CPT

## 2022-08-24 PROCEDURE — 84450 TRANSFERASE (AST) (SGOT): CPT

## 2022-09-30 DIAGNOSIS — M1A.9XX1 CHRONIC GOUT WITH TOPHUS, UNSPECIFIED CAUSE, UNSPECIFIED SITE: ICD-10-CM

## 2022-09-30 RX ORDER — ALLOPURINOL 100 MG/1
100 TABLET ORAL DAILY
Qty: 90 TABLET | Refills: 3 | Status: SHIPPED | OUTPATIENT
Start: 2022-09-30

## 2022-10-08 DIAGNOSIS — I10 ESSENTIAL HYPERTENSION: ICD-10-CM

## 2022-10-11 RX ORDER — AMLODIPINE BESYLATE 10 MG/1
10 TABLET ORAL DAILY
Qty: 90 TABLET | Refills: 3 | Status: SHIPPED | OUTPATIENT
Start: 2022-10-11

## 2022-10-20 DIAGNOSIS — I10 ESSENTIAL HYPERTENSION: ICD-10-CM

## 2022-10-20 DIAGNOSIS — E78.2 MIXED HYPERLIPIDEMIA: ICD-10-CM

## 2022-10-21 RX ORDER — ATORVASTATIN CALCIUM 40 MG/1
TABLET, FILM COATED ORAL
Qty: 45 TABLET | Refills: 3 | Status: SHIPPED | OUTPATIENT
Start: 2022-10-21

## 2022-10-21 RX ORDER — LOSARTAN POTASSIUM 100 MG/1
100 TABLET ORAL DAILY
Qty: 90 TABLET | Refills: 3 | Status: SHIPPED | OUTPATIENT
Start: 2022-10-21

## 2023-02-21 ENCOUNTER — OFFICE VISIT (OUTPATIENT)
Age: 83
End: 2023-02-21
Payer: MEDICARE

## 2023-02-21 VITALS
RESPIRATION RATE: 18 BRPM | DIASTOLIC BLOOD PRESSURE: 75 MMHG | HEIGHT: 70 IN | TEMPERATURE: 97.3 F | HEART RATE: 86 BPM | SYSTOLIC BLOOD PRESSURE: 115 MMHG | OXYGEN SATURATION: 96 % | BODY MASS INDEX: 31.55 KG/M2 | WEIGHT: 220.4 LBS

## 2023-02-21 DIAGNOSIS — I48.0 PAROXYSMAL ATRIAL FIBRILLATION (HCC): ICD-10-CM

## 2023-02-21 DIAGNOSIS — N18.31 CHRONIC KIDNEY DISEASE, STAGE 3A (HCC): ICD-10-CM

## 2023-02-21 DIAGNOSIS — I10 ESSENTIAL (PRIMARY) HYPERTENSION: ICD-10-CM

## 2023-02-21 DIAGNOSIS — Z00.00 MEDICARE ANNUAL WELLNESS VISIT, SUBSEQUENT: Primary | ICD-10-CM

## 2023-02-21 DIAGNOSIS — I26.99 OTHER PULMONARY EMBOLISM WITHOUT ACUTE COR PULMONALE, UNSPECIFIED CHRONICITY (HCC): ICD-10-CM

## 2023-02-21 DIAGNOSIS — N32.0 BLADDER NECK CONTRACTURE: ICD-10-CM

## 2023-02-21 DIAGNOSIS — R32 BLADDER LEAK: ICD-10-CM

## 2023-02-21 DIAGNOSIS — C61 MALIGNANT NEOPLASM OF PROSTATE (HCC): ICD-10-CM

## 2023-02-21 DIAGNOSIS — E78.2 MIXED HYPERLIPIDEMIA: ICD-10-CM

## 2023-02-21 DIAGNOSIS — M10.9 GOUT, UNSPECIFIED CAUSE, UNSPECIFIED CHRONICITY, UNSPECIFIED SITE: ICD-10-CM

## 2023-02-21 DIAGNOSIS — N39.3 STRESS INCONTINENCE, MALE: ICD-10-CM

## 2023-02-21 DIAGNOSIS — C61 PROSTATE CANCER (HCC): ICD-10-CM

## 2023-02-21 PROCEDURE — G8427 DOCREV CUR MEDS BY ELIG CLIN: HCPCS | Performed by: NURSE PRACTITIONER

## 2023-02-21 PROCEDURE — 3074F SYST BP LT 130 MM HG: CPT | Performed by: NURSE PRACTITIONER

## 2023-02-21 PROCEDURE — 1036F TOBACCO NON-USER: CPT | Performed by: NURSE PRACTITIONER

## 2023-02-21 PROCEDURE — G8417 CALC BMI ABV UP PARAM F/U: HCPCS | Performed by: NURSE PRACTITIONER

## 2023-02-21 PROCEDURE — 1123F ACP DISCUSS/DSCN MKR DOCD: CPT | Performed by: NURSE PRACTITIONER

## 2023-02-21 PROCEDURE — 3078F DIAST BP <80 MM HG: CPT | Performed by: NURSE PRACTITIONER

## 2023-02-21 PROCEDURE — 99214 OFFICE O/P EST MOD 30 MIN: CPT | Performed by: NURSE PRACTITIONER

## 2023-02-21 PROCEDURE — G8484 FLU IMMUNIZE NO ADMIN: HCPCS | Performed by: NURSE PRACTITIONER

## 2023-02-21 PROCEDURE — G0439 PPPS, SUBSEQ VISIT: HCPCS | Performed by: NURSE PRACTITIONER

## 2023-02-21 RX ORDER — PREDNISONE 10 MG/1
10 TABLET ORAL SEE ADMIN INSTRUCTIONS
Qty: 21 EACH | Refills: 0 | Status: SHIPPED | OUTPATIENT
Start: 2023-02-21

## 2023-02-21 RX ORDER — ACETAMINOPHEN 325 MG/1
650 TABLET ORAL
COMMUNITY
Start: 2006-10-25

## 2023-02-21 ASSESSMENT — PATIENT HEALTH QUESTIONNAIRE - PHQ9
SUM OF ALL RESPONSES TO PHQ QUESTIONS 1-9: 0
SUM OF ALL RESPONSES TO PHQ QUESTIONS 1-9: 0
2. FEELING DOWN, DEPRESSED OR HOPELESS: 0
1. LITTLE INTEREST OR PLEASURE IN DOING THINGS: 0
SUM OF ALL RESPONSES TO PHQ QUESTIONS 1-9: 0
SUM OF ALL RESPONSES TO PHQ QUESTIONS 1-9: 0
SUM OF ALL RESPONSES TO PHQ9 QUESTIONS 1 & 2: 0

## 2023-02-21 NOTE — PROGRESS NOTES
Medicare Annual Wellness Visit    [address] is here for Medicare AWV, Cholesterol Problem, Hypertension, and Gout    Assessment & Plan   Medicare annual wellness visit, subsequent  Essential (primary) hypertension  -     Basic Metabolic Panel; Future  Mixed hyperlipidemia  -     Lipid Panel; Future  -     ALT; Future  -     AST; Future  Gout, unspecified cause, unspecified chronicity, unspecified site  Other pulmonary embolism without acute cor pulmonale, unspecified chronicity ([facility] [address]  Assessment & Plan:   Monitored by specialist- no acute findings meriting change in the plan    Paroxysmal atrial fibrillation ([facility] [address]  Assessment & Plan:   Monitored by specialist- no acute findings meriting change in the plan    Malignant neoplasm of prostate ([facility] [address]  Chronic kidney disease, stage 3a (HCC)  Bladder neck contracture  -     [facility]  Prostate cancer [facility])  Assessment & Plan:   Monitored by specialist- no acute findings meriting change in the plan    Orders:  -     Urology [facility]  Bladder leak  -     [facility] of [address] incontinence, male  -     [facility]      Recommendations for Tears for Life Due: see orders and patient instructions/AVS.  Recommended screening schedule for the next 5-10 years is provided to the patient in written form: see Patient Instructions/AVS.     Return in 6 months (on 8/21/2023) for HTN, HLD, In Office (ONLY), Labs 1 Wk Prior. Subjective   The following acute and/or chronic problems were also addressed today:  HTN, HLD    Patient's complete Health Risk Assessment and screening values have been reviewed and are found in Flowsheets. The following problems were reviewed today and where indicated follow up appointments were made and/or referrals ordered.     Positive Risk Factor Screenings with Interventions:                 Weight and Activity:  Physical Activity: Inactive    Days of Exercise per Week: 0 days Minutes of Exercise per Session: 0 min     On average, how many days per week do you engage in moderate to strenuous exercise (like a brisk walk)?: 0 days  Have you lost any weight without trying in the past 3 months?: (!) Yes  Body mass index: (!) 31.62    Inactivity Interventions:  Patient comments: has not in a while but when the weather gets better he will start golfing again 2 times per week. Unintentional Weight Loss Interventions:  See above  Obesity Interventions:  See above pt comments    Dentist Screen:  Have you seen the dentist within the past year?: (!) No    Intervention:  Advised to schedule with their dentist    Hearing Screen:  Do you or your family notice any trouble with your hearing that hasn't been managed with hearing aids?: (!) Yes    Interventions:  Patient declines any further evaluation or treatment    Vision Screen:  Do you have difficulty driving, watching TV, or doing any of your daily activities because of your eyesight?: No  Have you had an eye exam within the past year?: (!) No  No results found. Interventions:   Patient declines any further evaluation or treatment              Objective   Vitals:    02/21/23 1001   BP: 115/75   Site: Left Upper Arm   Position: Sitting   Cuff Size: Large Adult   Pulse: 86   Resp: 18   Temp: 97.3 °F (36.3 °C)   TempSrc: Temporal   SpO2: 96%   Weight: 220 lb 6.4 oz (100 kg)   Height: 5' 10\" (1.778 m)      Body mass index is 31.62 kg/m². Allergies   Allergen Reactions    Hydrocodone-Acetaminophen Swelling     Prior to Visit Medications    Medication Sig Taking?  Authorizing Provider   acetaminophen (TYLENOL) 325 MG tablet 650 mg Yes Historical Provider, MD   predniSONE 10 MG (21) TBPK Take 10 mg by mouth See Admin Instructions Yes HILTON Manzano NP   allopurinol (ZYLOPRIM) 100 MG tablet Take 100 mg by mouth daily Yes Ar Automatic Reconciliation   amLODIPine (NORVASC) 10 MG tablet Take 10 mg by mouth daily Yes Ar Automatic Reconciliation   atorvastatin (LIPITOR) 40 MG tablet TAKE 1/2 TABLET BY MOUTH DAILY Yes Ar Automatic Reconciliation   Cholecalciferol 50 MCG (2000 UT) CAPS Take by mouth daily Yes Ar Automatic Reconciliation   losartan (COZAAR) 100 MG tablet Take 100 mg by mouth daily Yes Ar Automatic Reconciliation   metoprolol succinate (TOPROL XL) 50 MG extended release tablet Take 50 mg by mouth daily Yes Ar Automatic Reconciliation   rivaroxaban (XARELTO) 20 MG TABS tablet Take by mouth daily Yes Ar Automatic Reconciliation       CareTeam (Including outside providers/suppliers regularly involved in providing care):   Patient Care Team:  HILTON Glynn NP as PCP - [address] [place] [address], APRN - NP as PCP - [facility] Provider     Reviewed and updated this visit:  Tobacco  Allergies  Meds  Problems  Med Hx  Surg Hx  Soc Hx  Fam Hx               HILTON Glynn NP

## 2023-02-21 NOTE — PATIENT INSTRUCTIONS
Learning About Being Active as an Older Adult  Why is being active important as you get older? Being active is one of the best things you can do for your health. And it's never too late to start. Being active--or getting active, if you aren't already--has definite benefits. It can:  Give you more energy,  Keep your mind sharp. Improve balance to reduce your risk of falls. Help you manage chronic illness with fewer medicines. No matter how old you are, how fit you are, or what health problems you have, there is a form of activity that will work for you. And the more physical activity you can do, the better your overall health will be. What kinds of activity can help you stay healthy? Being more active will make your daily activities easier. Physical activity includes planned exercise and things you do in daily life. There are four types of activity:  Aerobic. Doing aerobic activity makes your heart and lungs strong. Includes walking, dancing, and gardening. Aim for at least 2½ hours spread throughout the week. It improves your energy and can help you sleep better. Muscle-strengthening. This type of activity can help maintain muscle and strengthen bones. Includes climbing stairs, using resistance bands, and lifting or carrying heavy loads. Aim for at least twice a week. It can help protect the knees and other joints. Stretching. Stretching gives you better range of motion in joints and muscles. Includes upper arm stretches, calf stretches, and gentle yoga. Aim for at least twice a week, preferably after your muscles are warmed up from other activities. It can help you function better in daily life. Balancing. This helps you stay coordinated and have good posture. Includes heel-to-toe walking, xochitl chi, and certain types of yoga. Aim for at least 3 days a week. It can reduce your risk of falling.   Even if you have a hard time meeting the recommendations, it's better to be more active than less active. All activity done in each category counts toward your weekly total. You'd be surprised how daily things like carrying groceries, keeping up with grandchildren, and taking the stairs can add up. What keeps you from being active? If you've had a hard time being more active, you're not alone. Maybe you remember being able to do more. Or maybe you've never thought of yourself as being active. It's frustrating when you can't do the things you want. Being more active can help. What's holding you back? Getting started. Have a goal, but break it into easy tasks. Small steps build into big accomplishments. Staying motivated. If you feel like skipping your activity, remember your goal. Maybe you want to move better and stay independent. Every activity gets you one step closer. Not feeling your best.  Start with 5 minutes of an activity you enjoy. Prove to yourself you can do it. As you get comfortable, increase your time. You may not be where you want to be. But you're in the process of getting there. Everyone starts somewhere. How can you find safe ways to stay active? Talk with your doctor about any physical challenges you're facing. Make a plan with your doctor if you have a health problem or aren't sure how to get started with activity. If you're already active, ask your doctor if there is anything you should change to stay safe as your body and health change. If you tend to feel dizzy after you take medicine, avoid activity at that time. Try being active before you take your medicine. This will reduce your risk of falls. If you plan to be active at home, make sure to clear your space before you get started. Remove things like TV cords, coffee tables, and throw rugs. It's safest to have plenty of space to move freely. The key to getting more active is to take it slow and steady. Try to improve only a little bit at a time.  Pick just one area to improve on at first. And if an activity hurts, stop and talk to your doctor. Where can you learn more? Go to http://www.cueva.com/ and enter P600 to learn more about \"Learning About Being Active as an Older Adult. \"  Current as of: October 10, 2022               Content Version: 13.5  © 2299-3069 Healthwise, Incorporated. Care instructions adapted under license by [facility] ([facility]). If you have questions about a medical condition or this instruction, always ask your healthcare professional. [place] [address] any warranty or liability for your use of this information. Learning About Dental Care for Older Adults  Dental care for older adults: Overview  Dental care for older people is much the same as for younger adults. But older adults do have concerns that younger adults do not. Older adults may have problems with gum disease and decay on the roots of their teeth. They may need missing teeth replaced or broken fillings fixed. Or they may have dentures that need to be cared for. Some older adults may have trouble holding a toothbrush. You can help remind the person you are caring for to brush and floss their teeth or to clean their dentures. In some cases, you may need to do the brushing and other dental care tasks. People who have trouble using their hands or who have dementia may need this extra help. How can you help with dental care? Normal dental care  To keep the teeth and gums healthy:  Brush the teeth with fluoride toothpaste twice a day--in the morning and at night--and floss at least once a day. Plaque can quickly build up on the teeth of older adults. Watch for the signs of gum disease. These signs include gums that bleed after brushing or after eating hard foods, such as apples. See a dentist regularly. Many experts recommend checkups every 6 months. Keep the dentist up to date on any new medications the person is taking.   Encourage a balanced diet that includes whole grains, vegetables, and fruits, and that is low in saturated fat and sodium. Encourage the person you're caring for not to use tobacco products. They can affect dental and general health. Many older adults have a fixed income and feel that they can't afford dental care. But most towns and [facility] have programs in which dentists help older adults by lowering fees. Contact your area's public health offices or  for information about dental care in your area. Using a toothbrush  Older adults with arthritis sometimes have trouble brushing their teeth because they can't easily hold the toothbrush. Their hands and fingers may be stiff, painful, or weak. If this is the case, you can: Offer an electric toothbrush. Enlarge the handle of a non-electric toothbrush by wrapping a sponge, an elastic bandage, or adhesive tape around it. Push the toothbrush handle through a ball made of rubber or soft foam.  Make the handle longer and thicker by taping Popsicle sticks or tongue depressors to it. You may also be able to buy special toothbrushes, toothpaste dispensers, and floss holders. Your doctor may recommend a soft-bristle toothbrush if the person you care for bleeds easily. Bleeding can happen because of a health problem or from certain medicines. A toothpaste for sensitive teeth may help if the person you care for has sensitive teeth. How do you brush and floss someone's teeth? If the person you are caring for has a hard time cleaning their teeth on their own, you may need to brush and floss their teeth for them. It may be easiest to have the person sit and face away from you, and to sit or stand behind them. That way you can steady their head against your arm as you reach around to floss and brush their teeth. Choose a place that has good lighting and is comfortable for both of you. Before you begin, gather your supplies. You will need gloves, floss, a toothbrush, and a container to hold water if you are not near a sink.  Wash and dry your hands well and put on gloves. Start by flossing:  Gently work a piece of floss between each of the teeth toward the gums. A plastic flossing tool may make this easier, and they are available at most [facility]. Curve the floss around each tooth into a U-shape and gently slide it under the gum line. Move the floss firmly up and down several times to scrape off the plaque. After you've finished flossing, throw away the used floss and begin brushing:  Wet the brush and apply toothpaste. Place the brush at a 45-degree angle where the teeth meet the gums. Press firmly, and move the brush in small circles over the surface of the teeth. Be careful not to brush too hard. Vigorous brushing can make the gums pull away from the teeth and can scratch the tooth enamel. Brush all surfaces of the teeth, on the tongue side and on the cheek side. Pay special attention to the front teeth and all surfaces of the back teeth. Brush chewing surfaces with short back-and-forth strokes. After you've finished, help the person rinse the remaining toothpaste from their mouth. Where can you learn more? Go to http://www.woods.com/ and enter F944 to learn more about \"Learning About Dental Care for Older Adults. \"  Current as of: June 16, 2022               Content Version: 13.5  © 0746-2210 Healthwise, Incorporated. Care instructions adapted under license by [facility] ([facility]). If you have questions about a medical condition or this instruction, always ask your healthcare professional. [place] [address] any warranty or liability for your use of this information. Learning About Vision Tests  What are vision tests? The four most common vision tests are visual acuity tests, refraction, visual field tests, and color vision tests. Visual acuity (sharpness) tests  These tests are used: To see if you need glasses or contact lenses. To monitor an eye problem. To check an eye injury.   Visual acuity tests are done as part of routine exams. You may also have this test when you get your 's license or apply for some types of jobs. Visual field tests  These tests are used: To check for vision loss in any area of your range of vision. To screen for certain eye diseases. To look for nerve damage after a stroke, head injury, or other problem that could reduce blood flow to the brain. Refraction and color tests  A refraction test is done to find the right prescription for glasses and contact lenses. A color vision test is done to check for color blindness. Color vision is often tested as part of a routine exam. You may also have this test when you apply for a job where recognizing different colors is important, such as , electronics, or the Figure 1. How are vision tests done? Visual acuity test   You cover one eye at a time. You read aloud from a wall chart across the room. You read aloud from a small card that you hold in your hand. Refraction   You look into a special device. The device puts lenses of different strengths in front of each eye to see how strong your glasses or contact lenses need to be. Visual field tests   Your doctor may have you look through special machines. Or your doctor may simply have you stare straight ahead while they move a finger into and out of your field of vision. Color vision test   You look at pieces of printed test patterns in various colors. You say what number or symbol you see. Your doctor may have you trace the number or symbol using a pointer. How do these tests feel? There is very little chance of having a problem from this test. If dilating drops are used for a vision test, they may make the eyes sting and cause a medicine taste in the mouth. Follow-up care is a key part of your treatment and safety. Be sure to make and go to all appointments, and call your doctor if you are having problems.  It's also a good idea to know your test results and keep a list of the medicines you take. Where can you learn more? Go to http://www.cueva.com/ and enter G551 to learn more about \"Learning About Vision Tests. \"  Current as of: October 12, 2022               Content Version: 13.5  © 5946-9037 Healthwise, Incorporated. Care instructions adapted under license by [facility] ([facility]). If you have questions about a medical condition or this instruction, always ask your healthcare professional. [place] [address] any warranty or liability for your use of this information. Advance Directives: Care Instructions  Overview  An advance directive is a legal way to state your wishes at the end of your life. It tells your family and your doctor what to do if you can't say what you want. There are two main types of advance directives. You can change them any time your wishes change. Living will. This form tells your family and your doctor your wishes about life support and other treatment. The form is also called a declaration. Medical power of . This form lets you name a person to make treatment decisions for you when you can't speak for yourself. This person is called a health care agent (health care proxy, health care surrogate). The form is also called a durable power of  for health care. If you do not have an advance directive, decisions about your medical care may be made by a family member, or by a doctor or a  who doesn't know you. It may help to think of an advance directive as a gift to the people who care for you. If you have one, they won't have to make tough decisions by themselves. For more information, including forms for your state, see the [address] website (www.caringinfo.org/planning/advance-directives/). Follow-up care is a key part of your treatment and safety. Be sure to make and go to all appointments, and call your doctor if you are having problems.  It's also a good idea to know your test results and keep a list of the medicines you take. What should you include in an advance directive? Many states have a unique advance directive form. (It may ask you to address specific issues.) Or you might use a universal form that's approved by many states. If your form doesn't tell you what to address, it may be hard to know what to include in your advance directive. Use the questions below to help you get started. Who do you want to make decisions about your medical care if you are not able to? What life-support measures do you want if you have a serious illness that gets worse over time or can't be cured? What are you most afraid of that might happen? (Maybe you're afraid of having pain, losing your independence, or being kept alive by machines.)  Where would you prefer to die? (Your home? A hospital? A nursing home?)  Do you want to donate your organs when you die? Do you want certain Denominational practices performed before you die? When should you call for help? Be sure to contact your doctor if you have any questions. Where can you learn more? Go to http://[employer].cueva.com/ and enter R264 to learn more about \"Advance Directives: Care Instructions. \"  Current as of: June 16, 2022               Content Version: 13.5  © 4964-9237 Healthwise, Incorporated. Care instructions adapted under license by [facility] ([facility]). If you have questions about a medical condition or this instruction, always ask your healthcare professional. [place] [address] any warranty or liability for your use of this information. Starting a Weight Loss Plan: Care Instructions  Overview     If you're thinking about losing weight, it can be hard to know where to start. Your doctor can help you set up a weight loss plan that best meets your needs. You may want to take a class on nutrition or exercise, or you could join a weight loss support group.  If you have questions about how to make changes to your eating or exercise habits, ask your doctor about seeing a registered dietitian or an exercise specialist.  It can be a big challenge to lose weight. But you don't have to make huge changes at once. Make small changes, and stick with them. When those changes become habit, add a few more changes. If you don't think you're ready to make changes right now, try to pick a date in the future. Make an appointment to see your doctor to discuss whether the time is right for you to start a plan. Follow-up care is a key part of your treatment and safety. Be sure to make and go to all appointments, and call your doctor if you are having problems. It's also a good idea to know your test results and keep a list of the medicines you take. How can you care for yourself at home? Set realistic goals. Many people expect to lose much more weight than is likely. A weight loss of 5% to 10% of your body weight may be enough to improve your health. Get family and friends involved to provide support. Talk to them about why you are trying to lose weight, and ask them to help. They can help by participating in exercise and having meals with you, even if they may be eating something different. Find what works best for you. If you do not have time or do not like to cook, a program that offers meal replacement bars or shakes may be better for you. Or if you like to prepare meals, finding a plan that includes daily menus and recipes may be best.  Ask your doctor about other health professionals who can help you achieve your weight loss goals. A dietitian can help you make healthy changes in your diet. An exercise specialist or  can help you develop a safe and effective exercise program.  A counselor or psychiatrist can help you cope with issues such as depression, anxiety, or family problems that can make it hard to focus on weight loss.   Consider joining a support group for people who are trying to lose weight. Your doctor can suggest groups in your area. Where can you learn more? Go to http://www.woods.com/ and enter U357 to learn more about \"Starting a Weight Loss Plan: Care Instructions. \"  Current as of: August 25, 2022               Content Version: 13.5  © 3504-9710 [employer]. Care instructions adapted under license by [facility] ([facility]). If you have questions about a medical condition or this instruction, always ask your healthcare professional. [place] [address] any warranty or liability for your use of this information. A Healthy Heart: Care Instructions  Your Care Instructions     Coronary artery disease, also called heart disease, occurs when a substance called plaque builds up in the vessels that supply oxygen-rich blood to your heart muscle. This can narrow the blood vessels and reduce blood flow. A heart attack happens when blood flow is completely blocked. A high-fat diet, smoking, and other factors increase the risk of heart disease. Your doctor has found that you have a chance of having heart disease. You can do lots of things to keep your heart healthy. It may not be easy, but you can change your diet, exercise more, and quit smoking. These steps really work to lower your chance of heart disease. Follow-up care is a key part of your treatment and safety. Be sure to make and go to all appointments, and call your doctor if you are having problems. It's also a good idea to know your test results and keep a list of the medicines you take. How can you care for yourself at home? Diet    Use less salt when you cook and eat. This helps lower your blood pressure. Taste food before salting. Add only a little salt when you think you need it.  With time, your taste buds will adjust to less salt.     Eat fewer snack items, fast foods, canned soups, and other high-salt, high-fat, processed foods.     Read food labels and try to avoid saturated and trans fats. They increase your risk of heart disease by raising cholesterol levels.     Limit the amount of solid fat-butter, margarine, and shortening-you eat. Use olive, peanut, or canola oil when you cook. Bake, broil, and steam foods instead of frying them.     Eat a variety of fruit and vegetables every day. Dark green, deep orange, red, or yellow fruits and vegetables are especially good for you. Examples include spinach, carrots, peaches, and berries.     Foods high in fiber can reduce your cholesterol and provide important vitamins and minerals. High-fiber foods include whole-grain cereals and breads, oatmeal, beans, brown rice, citrus fruits, and apples.     Eat lean proteins. Heart-healthy proteins include seafood, lean meats and poultry, eggs, beans, peas, nuts, seeds, and soy products.     Limit drinks and foods with added sugar. These include candy, desserts, and soda pop. Lifestyle changes    If your doctor recommends it, get more exercise. Walking is a good choice. Bit by bit, increase the amount you walk every day. Try for at least 30 minutes on most days of the week. You also may want to swim, bike, or do other activities.     Do not smoke. If you need help quitting, talk to your doctor about stop-smoking programs and medicines. These can increase your chances of quitting for good. Quitting smoking may be the most important step you can take to protect your heart. It is never too late to quit.     Limit alcohol to 2 drinks a day for men and 1 drink a day for women. Too much alcohol can cause health problems.     Manage other health problems such as diabetes, high blood pressure, and high cholesterol. If you think you may have a problem with alcohol or drug use, talk to your doctor. Medicines    Take your medicines exactly as prescribed. Call your doctor if you think you are having a problem with your medicine.     If your doctor recommends aspirin, take the amount directed each day.  Make sure you take aspirin and not another kind of pain reliever, such as acetaminophen (Tylenol). When should you call for help? Call 911 if you have symptoms of a heart attack. These may include:    Chest pain or pressure, or a strange feeling in the chest.     Sweating.     Shortness of breath.     Pain, pressure, or a strange feeling in the back, neck, jaw, or upper belly or in one or both shoulders or arms.     Lightheadedness or sudden weakness.     A fast or irregular heartbeat. After you call 911, the  may tell you to chew 1 adult-strength or 2 to 4 low-dose aspirin. Wait for an ambulance. Do not try to drive yourself. Watch closely for changes in your health, and be sure to contact your doctor if you have any problems. Where can you learn more? Go to http://[employer].cueva.com/ and enter F075 to learn more about \"A Healthy Heart: Care Instructions. \"  Current as of: September 7, 2022               Content Version: 13.5  © 2006-2022 [employer]. Care instructions adapted under license by [facility] ([facility]). If you have questions about a medical condition or this instruction, always ask your healthcare professional. [place] [address] any warranty or liability for your use of this information. [facility] - 2/21/2023  Medicare offers a range of preventive health benefits. Some of the tests and screenings are paid in full while other may be subject to a deductible, co-insurance, and/or copay. Some of these benefits include a comprehensive review of your medical history including lifestyle, illnesses that may run in your family, and various assessments and screenings as appropriate. After reviewing your medical record and screening and assessments performed today your provider may have ordered immunizations, labs, imaging, and/or referrals for you.   A list of these orders (if applicable) as well as your Preventive Care list are included within your After Visit Summary for your review. Other Preventive Recommendations:    A preventive eye exam performed by an eye specialist is recommended every 1-2 years to screen for glaucoma; cataracts, macular degeneration, and other eye disorders. A preventive dental visit is recommended every 6 months. Try to get at least 150 minutes of exercise per week or 10,000 steps per day on a pedometer . Order or download the FREE \"Exercise & Physical Activity: Your Everyday Guide\" from [employer]. Call 8-588.558.8272 or search [employer] online. You need 7272-4902 mg of calcium and 5801-5482 IU of vitamin D per day. It is possible to meet your calcium requirement with diet alone, but a vitamin D supplement is usually necessary to meet this goal.  When exposed to the sun, use a sunscreen that protects against both UVA and UVB radiation with an SPF of 30 or greater. Reapply every 2 to 3 hours or after sweating, drying off with a towel, or swimming. Always wear a seat belt when traveling in a car. Always wear a helmet when riding a bicycle or motorcycle.

## 2023-02-21 NOTE — PROGRESS NOTES
Hypertension      Assessment/Plan:     hypertension stable, hyperlipidemia stable. Lab results and schedule of future lab studies reviewed with patient. Repeat labs ordered prior to next appointment. Orders and follow up as documented in patient record. Reviewed diet, exercise and weight control. Recommended sodium restriction. Cardiovascular risk and specific lipid/LDL goals reviewed. Lennox Menezes was seen today for medicare awv, cholesterol problem, hypertension and gout. Diagnoses and all orders for this visit:    Medicare annual wellness visit, subsequent    Essential (primary) hypertension  -     Basic Metabolic Panel; Future    Mixed hyperlipidemia  -     Lipid Panel; Future  -     ALT; Future  -     AST; Future    Gout, unspecified cause, unspecified chronicity, unspecified site    Other pulmonary embolism without acute cor pulmonale, unspecified chronicity (HCC)    Paroxysmal atrial fibrillation (HCC)    Malignant neoplasm of prostate (HCC)    Chronic kidney disease, stage 3a (HCC)    Bladder neck contracture  -     [facility] [place] [place]    Prostate cancer [facility])  -     [facility] [place] [place]    Bladder leak  -     [facility] [place] [address] [facility] incontinence, male  -     [facility] [place] [place]    Other orders  -     predniSONE 10 MG (21) TBPK; Take 10 mg by mouth See Admin Instructions       Follow-up and Dispositions    Return in 6 months (on 8/21/2023) for HTN, HLD, In Office (ONLY), Labs 1 Wk Prior. Subjective:     Dasha Mendez is a 80 y.o. White (non-) male who presents for follow-up of hypertension. Pt denies any SOB, edema, CP, orthopnea, palpitations, nocturnal dyspnea, headaches, or blurred vision.      Diet and Lifestyle: generally follows a low fat low cholesterol diet and not attempting to follow a low sodium diet  Home BP Monitoring: Is not measured at home    Cardiovascular ROS: taking medications as instructed, no medication side effects noted, no TIA's, no chest pain on exertion, no dyspnea on exertion, noting swelling of ankles. New concerns: Pt was in the hospital for perisplenic hematoma in January. He was sent to [facility] from [facility] and saw a surgeon who said that his spleen was bleeding but was no longer bleeding and sent him home. Current Outpatient Medications   Medication Sig Dispense Refill    acetaminophen (TYLENOL) 325 MG tablet 650 mg      predniSONE 10 MG (21) TBPK Take 10 mg by mouth See Admin Instructions 21 each 0    allopurinol (ZYLOPRIM) 100 MG tablet Take 100 mg by mouth daily      amLODIPine (NORVASC) 10 MG tablet Take 10 mg by mouth daily      atorvastatin (LIPITOR) 40 MG tablet TAKE 1/2 TABLET BY MOUTH DAILY      Cholecalciferol 50 MCG (2000 UT) CAPS Take by mouth daily      losartan (COZAAR) 100 MG tablet Take 100 mg by mouth daily      metoprolol succinate (TOPROL XL) 50 MG extended release tablet Take 50 mg by mouth daily      rivaroxaban (XARELTO) 20 MG TABS tablet Take by mouth daily       No current facility-administered medications for this visit. Review of Systems, additional:  Pertinent items are noted in HPI.     Objective:   /75 (Site: Left Upper Arm, Position: Sitting, Cuff Size: Large Adult)   Pulse 86   Temp 97.3 °F (36.3 °C) (Temporal)   Resp 18   Ht 5' 10\" (1.778 m)   Wt 220 lb 6.4 oz (100 kg)   SpO2 96%   BMI 31.62 kg/m²     Lab Results   Component Value Date/Time     08/24/2022 10:41 AM    K 4.3 08/24/2022 10:41 AM     08/24/2022 10:41 AM    CO2 28 08/24/2022 10:41 AM    BUN 20 08/24/2022 10:41 AM    GFRAA 46 08/24/2022 10:41 AM     Lab Results   Component Value Date/Time    WBC 11.1 12/31/2020 08:37 AM    HGB 14.6 12/31/2020 08:37 AM    HCT 44.2 12/31/2020 08:37 AM     12/31/2020 08:37 AM    MCV 87.7 12/31/2020 08:37 AM     No results found for: HBA1C, TVI2CMHJ  Lab Results   Component Value Date/Time    CHOL 136 08/24/2022 10:41 AM    HDL 49 08/24/2022 10:41 AM       Appearance: alert, well appearing, and in no distress and oriented to person, place, and time. General exam BP noted to be well controlled today in office, S1, S2 normal, no gallop, no murmur, chest clear, no JVD, no HSM, no edema. Lab review: orders written for new lab studies as appropriate; see orders. Disclaimer: The patient understands our medical plan. Alternatives have been explained and offered. The risks, benefits and significant side effects of all medications have been reviewed. Anticipated time course and progression of condition reviewed. All questions have been addressed. He is encouraged to employ the information provided in the after visit summary, which was reviewed. Where applicable, he is instructed to call the clinic if he has not been notified either by phone or through [address] with the results of his tests or with an appointment plan for any referrals within 1 week(s). No news is not good news; it's no news. The patient  is to call if his condition worsens or fails to improve or if significant side effects are experienced. Aspects of this note may have been generated using voice recognition software. Despite editing, there may be unrecognized errors.       HILTON Salas NP     02/22/23

## 2023-06-26 ENCOUNTER — TELEPHONE (OUTPATIENT)
Age: 83
End: 2023-06-26

## 2023-07-03 ENCOUNTER — OFFICE VISIT (OUTPATIENT)
Age: 83
End: 2023-07-03
Payer: MEDICARE

## 2023-07-03 VITALS
DIASTOLIC BLOOD PRESSURE: 84 MMHG | SYSTOLIC BLOOD PRESSURE: 131 MMHG | OXYGEN SATURATION: 95 % | HEIGHT: 70 IN | TEMPERATURE: 97.7 F | WEIGHT: 205 LBS | HEART RATE: 89 BPM | RESPIRATION RATE: 18 BRPM | BODY MASS INDEX: 29.35 KG/M2

## 2023-07-03 DIAGNOSIS — Z09 HOSPITAL DISCHARGE FOLLOW-UP: Primary | ICD-10-CM

## 2023-07-03 PROCEDURE — 99214 OFFICE O/P EST MOD 30 MIN: CPT | Performed by: NURSE PRACTITIONER

## 2023-07-03 PROCEDURE — 3078F DIAST BP <80 MM HG: CPT | Performed by: NURSE PRACTITIONER

## 2023-07-03 PROCEDURE — G8417 CALC BMI ABV UP PARAM F/U: HCPCS | Performed by: NURSE PRACTITIONER

## 2023-07-03 PROCEDURE — 1123F ACP DISCUSS/DSCN MKR DOCD: CPT | Performed by: NURSE PRACTITIONER

## 2023-07-03 PROCEDURE — 3074F SYST BP LT 130 MM HG: CPT | Performed by: NURSE PRACTITIONER

## 2023-07-03 PROCEDURE — 1036F TOBACCO NON-USER: CPT | Performed by: NURSE PRACTITIONER

## 2023-07-03 PROCEDURE — G8427 DOCREV CUR MEDS BY ELIG CLIN: HCPCS | Performed by: NURSE PRACTITIONER

## 2023-07-03 PROCEDURE — 1111F DSCHRG MED/CURRENT MED MERGE: CPT | Performed by: NURSE PRACTITIONER

## 2023-07-03 RX ORDER — AMOXICILLIN 250 MG
2 CAPSULE ORAL 2 TIMES DAILY PRN
COMMUNITY
Start: 2023-06-19

## 2023-07-03 RX ORDER — ASCORBIC ACID 500 MG
500 TABLET ORAL DAILY
COMMUNITY
Start: 2023-06-19

## 2023-07-03 RX ORDER — GUAIFENESIN 600 MG/1
600 TABLET, EXTENDED RELEASE ORAL EVERY 12 HOURS PRN
COMMUNITY
Start: 2023-06-19

## 2023-07-03 RX ORDER — DOXYCYCLINE 100 MG/1
CAPSULE ORAL
COMMUNITY
Start: 2023-06-21

## 2023-07-03 RX ORDER — BUMETANIDE 1 MG/1
0.5 TABLET ORAL 2 TIMES DAILY
COMMUNITY
Start: 2023-06-21

## 2023-07-03 RX ORDER — ASPIRIN 81 MG/1
81 TABLET ORAL DAILY
COMMUNITY
Start: 2014-03-03

## 2023-07-03 RX ORDER — ASCORBIC ACID 250 MG
TABLET ORAL
COMMUNITY
Start: 2014-03-03 | End: 2023-07-03 | Stop reason: SDUPTHER

## 2023-07-03 ASSESSMENT — PATIENT HEALTH QUESTIONNAIRE - PHQ9
SUM OF ALL RESPONSES TO PHQ QUESTIONS 1-9: 0
2. FEELING DOWN, DEPRESSED OR HOPELESS: 0
SUM OF ALL RESPONSES TO PHQ QUESTIONS 1-9: 0
SUM OF ALL RESPONSES TO PHQ QUESTIONS 1-9: 0
1. LITTLE INTEREST OR PLEASURE IN DOING THINGS: 0
SUM OF ALL RESPONSES TO PHQ QUESTIONS 1-9: 0
SUM OF ALL RESPONSES TO PHQ9 QUESTIONS 1 & 2: 0

## 2023-07-03 NOTE — PROGRESS NOTES
Post-Discharge Transitional Care Management Progress Note      Pepper Room Sr. YOB: 1940    Date of Office Visit:  7/3/2023  Date of Hospital Admission: 6/3/2023  Date of Hospital Discharge: 6/21/2023    Care management risk score Rising risk (score 2-5) and Complex Care (Scores >=6): No Risk Score On File     Non face to face  following discharge, date last encounter closed (first attempt may have been earlier): 06/26/2023 06/26/2023    Call initiated 2 business days of discharge: Yes    ASSESSMENT/PLAN:   Hospital discharge follow-up  -     [place] DISCHARGE MEDS RECONCILED W/ CURRENT OUTPATIENT MED LIST      Medical Decision Making: moderate complexity  Return in about 8 weeks (around 8/29/2023) for Keep Existing Appointment, Fasting Labs 1 Wk Prior. Subjective:   HPI:  Follow up of Hospital problems/diagnosis(es): Chest Pain/CABG    Inpatient course: Discharge summary reviewed- see chart. S/p: CABG x 3, Surgeon: Vicki Luna, Date of Surgery: 06/12/23, LVEF: 34%    Interval history/Current status: Pt has home health for OT 4 days/week and is doing cardiac rehab at [facility] in 3 weeks    Patient Active Problem List   Diagnosis    Pacemaker    Atrial fibrillation [facility])    Essential hypertension    Mixed hyperlipidemia    Advance directive discussed with patient    History of bladder cancer    A-fib ([address])    Pulmonary embolus ([address])    Obesity    Gout, unspecified    Bladder neck contracture    Venous insufficiency    Hypertension    Prostate cancer ([address])    Bradycardia    Chronic renal disease, stage III ([address])       Medications listed as ordered at the time of discharge from hospital     Medication List            Accurate as of July 3, 2023 11:59 PM. If you have any questions, ask your nurse or doctor.                 CONTINUE taking these medications      allopurinol 100 MG tablet  Commonly known as: ZYLOPRIM     ascorbic acid 500 MG tablet  Commonly known as: VITAMIN C     aspirin 81 MG EC

## 2023-07-17 ENCOUNTER — HOSPITAL ENCOUNTER (OUTPATIENT)
Facility: HOSPITAL | Age: 83
Setting detail: RECURRING SERIES
Discharge: HOME | End: 2023-07-20

## 2023-07-17 ASSESSMENT — LIFESTYLE VARIABLES: SMOKELESS_TOBACCO: NO

## 2023-07-17 ASSESSMENT — EJECTION FRACTION: EF_VALUE: 48

## 2023-07-18 NOTE — PROGRESS NOTES
Pt presented with spouse to cardiac rehab services for intake and 6-minute walk test. Pt is always given the option of walking on the TM or walking in the hallway. Pt pre-walk VS were wt 192 lbs, HR 73, /74, SpO2 96% RA, 0 pain. During TM walk test, pt HR rapidly increased to 118 and test was stopped. THR for pt is 83-92. After VS were /79 and HR 94 on rest.    Fax sent to [facility] [place], [place] with [employer] to [phone]. Nia Washington with Annelise Naidu confirmed fax number and verbalized PA will be in office on Wednesday. Awaiting next steps to take with pt.

## 2023-07-24 ENCOUNTER — HOSPITAL ENCOUNTER (OUTPATIENT)
Facility: HOSPITAL | Age: 83
Setting detail: RECURRING SERIES
Discharge: HOME | End: 2023-07-27
Payer: MEDICARE

## 2023-07-24 VITALS — WEIGHT: 190 LBS | BODY MASS INDEX: 27.26 KG/M2

## 2023-07-24 PROCEDURE — 93798 PHYS/QHP OP CAR RHAB W/ECG: CPT

## 2023-07-24 ASSESSMENT — EXERCISE STRESS TEST
PEAK_BP: 120/70
PEAK_HR: 107
PEAK_RPE: 10
PEAK_BP: 120/70
PEAK_RPE: 10
PEAK_HR: 95
PEAK_RPD: 6
PEAK_METS: 1.87
PEAK_BP: 120/70

## 2023-07-24 ASSESSMENT — PATIENT HEALTH QUESTIONNAIRE - PHQ9
SUM OF ALL RESPONSES TO PHQ QUESTIONS 1-9: 1
10. IF YOU CHECKED OFF ANY PROBLEMS, HOW DIFFICULT HAVE THESE PROBLEMS MADE IT FOR YOU TO DO YOUR WORK, TAKE CARE OF THINGS AT HOME, OR GET ALONG WITH OTHER PEOPLE: 0
SUM OF ALL RESPONSES TO PHQ9 QUESTIONS 1 & 2: 0
2. FEELING DOWN, DEPRESSED OR HOPELESS: 0
SUM OF ALL RESPONSES TO PHQ QUESTIONS 1-9: 1
8. MOVING OR SPEAKING SO SLOWLY THAT OTHER PEOPLE COULD HAVE NOTICED. OR THE OPPOSITE, BEING SO FIGETY OR RESTLESS THAT YOU HAVE BEEN MOVING AROUND A LOT MORE THAN USUAL: 0
6. FEELING BAD ABOUT YOURSELF - OR THAT YOU ARE A FAILURE OR HAVE LET YOURSELF OR YOUR FAMILY DOWN: 0
5. POOR APPETITE OR OVEREATING: 0
3. TROUBLE FALLING OR STAYING ASLEEP: 0
9. THOUGHTS THAT YOU WOULD BE BETTER OFF DEAD, OR OF HURTING YOURSELF: 0
SUM OF ALL RESPONSES TO PHQ QUESTIONS 1-9: 1
1. LITTLE INTEREST OR PLEASURE IN DOING THINGS: 0
SUM OF ALL RESPONSES TO PHQ QUESTIONS 1-9: 1
4. FEELING TIRED OR HAVING LITTLE ENERGY: 1
7. TROUBLE CONCENTRATING ON THINGS, SUCH AS READING THE NEWSPAPER OR WATCHING TELEVISION: 0

## 2023-07-24 ASSESSMENT — LIFESTYLE VARIABLES: SMOKELESS_TOBACCO: NO

## 2023-07-24 ASSESSMENT — EJECTION FRACTION: EF_VALUE: 48

## 2023-07-24 NOTE — PROGRESS NOTES
Received phone call from [facility] from [employer] works with [employer] PA. Verbal order received pt HR can reach a max HR of 130.

## 2023-07-26 NOTE — PROGRESS NOTES
CARDIAC REHAB INITIAL ITP FOR REVIEW AND SIGNATURE    Taz Jaime Sr. 80 y.o. presented to cardiac rehab for an intake and a six minute walk test today with a primary diagnosis of CABG. Patient's EF is 48%. Lina Rodarte has a history of hypertension, status post CABG, atrial fibrillation, pulmonary embolus, bradycardia, and pacemaker. Cardiac risk factors include hypertension and these were reviewed with patient and spouse. Lina Rodarte is   and lives with their spouse. PHQ-9, depression score, is 1 and this is considered to be low. The result was discussed with patient who confirms score to be accurate and if above a 5, a copy of the results were sent to patient's PCP. Patient denied chest pain or SOB during 6 minute walk and the cardiac rhythm was A-Paced with Max . Taz Jaime Sr. will attend exercise and educational sessions 2 days a week in cardiac rehab for 36 sessions. Exceptions noted during intake include pt HR is allowed to reach a maximum of 130 per his cardiologist.      Goals for Rehab:    Patient name: Lina Rodarte. : 1940       Goals Comments   1. Increase patient's METS by 2.0 by end of program       [x] initial  [] met                  [] not met  [] progressing  Patient's initial METs was 1.87. Goal is to increase to 3.87.    2. Maintain target heart rate (THR) during exercise by end of program    [x] initial  [] met                  [] not met  [] progressing Pt will be able to exercise and maintain THR between 82-96 during each session to maximize cardiac benefits.      3. Incorporate Heart Healthy Lifestyle by end of program     [x] initial  [] met                  [] not met  [] progressing Pt will verbalize making healthy lifestyle changes that include but not limited to eating a heart healthy diet which includes whole foods such as fresh fruits and vegetables, incorporating cardiovascular exercise at home and stress

## 2023-08-01 ENCOUNTER — HOSPITAL ENCOUNTER (OUTPATIENT)
Facility: HOSPITAL | Age: 83
Setting detail: RECURRING SERIES
Discharge: HOME | End: 2023-08-04
Payer: MEDICARE

## 2023-08-01 VITALS — WEIGHT: 183 LBS | BODY MASS INDEX: 26.26 KG/M2

## 2023-08-01 PROCEDURE — 93798 PHYS/QHP OP CAR RHAB W/ECG: CPT

## 2023-08-01 ASSESSMENT — EXERCISE STRESS TEST
PEAK_HR: 120
PEAK_METS: 3.1
PEAK_RPE: 13

## 2023-08-03 ENCOUNTER — APPOINTMENT (OUTPATIENT)
Facility: HOSPITAL | Age: 83
End: 2023-08-03
Payer: MEDICARE

## 2023-08-08 ENCOUNTER — HOSPITAL ENCOUNTER (OUTPATIENT)
Facility: HOSPITAL | Age: 83
Setting detail: RECURRING SERIES
Discharge: HOME | End: 2023-08-11
Payer: MEDICARE

## 2023-08-08 VITALS — WEIGHT: 183 LBS | BODY MASS INDEX: 26.26 KG/M2

## 2023-08-08 PROCEDURE — 93798 PHYS/QHP OP CAR RHAB W/ECG: CPT

## 2023-08-08 ASSESSMENT — EXERCISE STRESS TEST
PEAK_RPE: 13
PEAK_METS: 4.1
PEAK_HR: 118

## 2023-08-10 ENCOUNTER — HOSPITAL ENCOUNTER (OUTPATIENT)
Facility: HOSPITAL | Age: 83
Setting detail: RECURRING SERIES
Discharge: HOME | End: 2023-08-13
Payer: MEDICARE

## 2023-08-10 VITALS — BODY MASS INDEX: 25.97 KG/M2 | WEIGHT: 181 LBS

## 2023-08-10 PROCEDURE — 93798 PHYS/QHP OP CAR RHAB W/ECG: CPT

## 2023-08-10 ASSESSMENT — EXERCISE STRESS TEST
PEAK_METS: 3.2
PEAK_HR: 125
PEAK_RPE: 13

## 2023-08-14 ENCOUNTER — HOSPITAL ENCOUNTER (OUTPATIENT)
Facility: HOSPITAL | Age: 83
Setting detail: SPECIMEN
Discharge: HOME | End: 2023-08-17
Payer: MEDICARE

## 2023-08-14 ENCOUNTER — APPOINTMENT (OUTPATIENT)
Age: 83
End: 2023-08-14
Payer: MEDICARE

## 2023-08-14 DIAGNOSIS — I10 ESSENTIAL (PRIMARY) HYPERTENSION: ICD-10-CM

## 2023-08-14 DIAGNOSIS — E78.2 MIXED HYPERLIPIDEMIA: ICD-10-CM

## 2023-08-14 DIAGNOSIS — I10 ESSENTIAL (PRIMARY) HYPERTENSION: Primary | ICD-10-CM

## 2023-08-14 LAB
ANION GAP SERPL CALC-SCNC: 8 MMOL/L (ref 3–18)
BUN SERPL-MCNC: 16 MG/DL (ref 7–18)
BUN/CREAT SERPL: 6 (ref 12–20)
CALCIUM SERPL-MCNC: 9 MG/DL (ref 8.5–10.1)
CHLORIDE SERPL-SCNC: 103 MMOL/L (ref 100–111)
CHOLEST SERPL-MCNC: 125 MG/DL
CO2 SERPL-SCNC: 25 MMOL/L (ref 21–32)
CREAT SERPL-MCNC: 2.55 MG/DL (ref 0.6–1.3)
GLUCOSE SERPL-MCNC: 82 MG/DL (ref 74–99)
HDLC SERPL-MCNC: 46 MG/DL (ref 40–60)
HDLC SERPL: 2.7 (ref 0–5)
LDLC SERPL CALC-MCNC: 60.4 MG/DL (ref 0–100)
LIPID PANEL: NORMAL
POTASSIUM SERPL-SCNC: 5.2 MMOL/L (ref 3.5–5.5)
SODIUM SERPL-SCNC: 136 MMOL/L (ref 136–145)
TRIGL SERPL-MCNC: 93 MG/DL
VLDLC SERPL CALC-MCNC: 18.6 MG/DL

## 2023-08-14 PROCEDURE — 80061 LIPID PANEL: CPT

## 2023-08-14 PROCEDURE — 80048 BASIC METABOLIC PNL TOTAL CA: CPT

## 2023-08-14 PROCEDURE — 36415 COLL VENOUS BLD VENIPUNCTURE: CPT

## 2023-08-15 ENCOUNTER — HOSPITAL ENCOUNTER (OUTPATIENT)
Facility: HOSPITAL | Age: 83
Setting detail: RECURRING SERIES
Discharge: HOME | End: 2023-08-18
Payer: MEDICARE

## 2023-08-15 VITALS — WEIGHT: 179 LBS | BODY MASS INDEX: 25.68 KG/M2

## 2023-08-15 PROCEDURE — 93798 PHYS/QHP OP CAR RHAB W/ECG: CPT

## 2023-08-15 ASSESSMENT — EXERCISE STRESS TEST
PEAK_METS: 3.2
PEAK_HR: 95
PEAK_RPE: 13

## 2023-08-17 ENCOUNTER — HOSPITAL ENCOUNTER (OUTPATIENT)
Facility: HOSPITAL | Age: 83
Setting detail: RECURRING SERIES
Discharge: HOME | End: 2023-08-20
Payer: MEDICARE

## 2023-08-17 VITALS — WEIGHT: 178 LBS | BODY MASS INDEX: 25.54 KG/M2

## 2023-08-17 PROCEDURE — 93798 PHYS/QHP OP CAR RHAB W/ECG: CPT

## 2023-08-17 ASSESSMENT — EXERCISE STRESS TEST
PEAK_HR: 112
PEAK_METS: 3.7
PEAK_RPE: 13

## 2023-08-21 ASSESSMENT — EJECTION FRACTION: EF_VALUE: 48

## 2023-08-21 ASSESSMENT — LIFESTYLE VARIABLES: SMOKELESS_TOBACCO: NO

## 2023-08-21 ASSESSMENT — EXERCISE STRESS TEST: PEAK_BP: 109/74

## 2023-08-21 NOTE — PROGRESS NOTES
Pacemaker (Pacemaker,afib,htn,pulmnary embolus, bradycardia)         Oxygen Intervention  Oxygen Use: No  O2 Sat Greater Than 90%: Yes    Individual Treatment Plan  ITP Visit Type: Re-assessment (30 Day)  1st Date of Exercise : 07/17/23  ITP Next Review Date: 09/18/23  Visit #/Total Visits: 6/36  EF%: 48 %  Risk Stratification: High  ITP: Exercise, Psychosocial, Tobacco, Nutrition, Education   Exercise   Stages of Change: Action  Assisted Devices: Walker         Exercise Prescription  Mode: Treadmill, Bike, Stepper, Ergometer, Rower, Other (comment), Track  Frequency per week: 2-3  Duration Per Session: 31-55  Intensity METS      : 1.87 - 2.87  RPE: 12-16  Progression: Progressing  Symptoms with Exercise: Shortness of breath  Target Heart Rate: 82-96  Resistance Training: Yes   Exercise Blood Pressures  Resting BP: 104/74  Peak BP: 109/74  Is BP WDL: Yes    Exercise Activity at Home  Type: thearpy, breathing exercies  Frequency: 5x  Duration: 20-25 mins  Resistance Training: No    Exercise Education  Education: Self pulse, Exercise safety, Signs/symptoms to report, RPE scale, Equipment orientation, Warm up/cool down, Physically active    Exercise Target Goal  Target Goal(s):  Individual exercise RX, BP < 140/90 or < 130/80, if DM or CKD, Aerobic activity 30 + minutes/day  5 days/week    Psychosocial  Stages of Change: Action    Psychosocial Intervention  Interventions: No intervention indicated  Currently Taking Psychotropic Meds: No  Medication Changes: No    Psychosocial Education  Education: Advanced directives, Benefits of CPR completion, Cardiac meds, Coping techniques, Environmental triggers, Impact self care behaviors on health, Relaxation techniques, Signs/symptoms of depression    Psychosocial Target Goals  Target Goal(s): Assess presence or absence of depression using a valid screening tool    Tobacco  Tobacco Use: No  Smokeless Tobacco Use: No         Nutrition  Stages of Change: Action  Diabetes: No

## 2023-08-22 ENCOUNTER — HOSPITAL ENCOUNTER (OUTPATIENT)
Facility: HOSPITAL | Age: 83
Setting detail: RECURRING SERIES
Discharge: HOME | End: 2023-08-25
Payer: MEDICARE

## 2023-08-22 VITALS — BODY MASS INDEX: 24.97 KG/M2 | WEIGHT: 174 LBS

## 2023-08-22 PROCEDURE — 93798 PHYS/QHP OP CAR RHAB W/ECG: CPT

## 2023-08-22 ASSESSMENT — EXERCISE STRESS TEST
PEAK_METS: 3.7
PEAK_RPE: 13
PEAK_HR: 119

## 2023-08-24 ENCOUNTER — HOSPITAL ENCOUNTER (OUTPATIENT)
Facility: HOSPITAL | Age: 83
Setting detail: RECURRING SERIES
End: 2023-08-24
Payer: MEDICARE

## 2023-08-29 ENCOUNTER — OFFICE VISIT (OUTPATIENT)
Age: 83
End: 2023-08-29
Payer: MEDICARE

## 2023-08-29 ENCOUNTER — HOSPITAL ENCOUNTER (OUTPATIENT)
Facility: HOSPITAL | Age: 83
Setting detail: RECURRING SERIES
Discharge: HOME | End: 2023-09-01
Payer: MEDICARE

## 2023-08-29 VITALS
HEART RATE: 68 BPM | OXYGEN SATURATION: 95 % | BODY MASS INDEX: 25.94 KG/M2 | TEMPERATURE: 97.5 F | WEIGHT: 181.2 LBS | DIASTOLIC BLOOD PRESSURE: 85 MMHG | SYSTOLIC BLOOD PRESSURE: 123 MMHG | HEIGHT: 70 IN | RESPIRATION RATE: 20 BRPM

## 2023-08-29 VITALS — WEIGHT: 174 LBS | BODY MASS INDEX: 24.97 KG/M2

## 2023-08-29 DIAGNOSIS — E78.2 MIXED HYPERLIPIDEMIA: ICD-10-CM

## 2023-08-29 DIAGNOSIS — E63.9 NUTRITIONAL DEFICIENCY: ICD-10-CM

## 2023-08-29 DIAGNOSIS — I10 ESSENTIAL (PRIMARY) HYPERTENSION: Primary | ICD-10-CM

## 2023-08-29 PROCEDURE — 3078F DIAST BP <80 MM HG: CPT | Performed by: NURSE PRACTITIONER

## 2023-08-29 PROCEDURE — 93798 PHYS/QHP OP CAR RHAB W/ECG: CPT

## 2023-08-29 PROCEDURE — G8427 DOCREV CUR MEDS BY ELIG CLIN: HCPCS | Performed by: NURSE PRACTITIONER

## 2023-08-29 PROCEDURE — 1036F TOBACCO NON-USER: CPT | Performed by: NURSE PRACTITIONER

## 2023-08-29 PROCEDURE — 99214 OFFICE O/P EST MOD 30 MIN: CPT | Performed by: NURSE PRACTITIONER

## 2023-08-29 PROCEDURE — 3074F SYST BP LT 130 MM HG: CPT | Performed by: NURSE PRACTITIONER

## 2023-08-29 PROCEDURE — 1123F ACP DISCUSS/DSCN MKR DOCD: CPT | Performed by: NURSE PRACTITIONER

## 2023-08-29 PROCEDURE — G8420 CALC BMI NORM PARAMETERS: HCPCS | Performed by: NURSE PRACTITIONER

## 2023-08-29 RX ORDER — POLYETHYLENE GLYCOL 3350 17 G/17G
POWDER, FOR SOLUTION ORAL
COMMUNITY
Start: 2023-06-19

## 2023-08-29 ASSESSMENT — PATIENT HEALTH QUESTIONNAIRE - PHQ9
SUM OF ALL RESPONSES TO PHQ QUESTIONS 1-9: 0
SUM OF ALL RESPONSES TO PHQ9 QUESTIONS 1 & 2: 0
1. LITTLE INTEREST OR PLEASURE IN DOING THINGS: 0
SUM OF ALL RESPONSES TO PHQ QUESTIONS 1-9: 0
2. FEELING DOWN, DEPRESSED OR HOPELESS: 0
SUM OF ALL RESPONSES TO PHQ QUESTIONS 1-9: 0
SUM OF ALL RESPONSES TO PHQ QUESTIONS 1-9: 0

## 2023-08-29 ASSESSMENT — EXERCISE STRESS TEST
PEAK_METS: 3.8
PEAK_RPE: 13
PEAK_HR: 134

## 2023-08-29 NOTE — PROGRESS NOTES
1. \"Have you been to the ER, urgent care clinic since your last visit? Hospitalized since your last visit? \" No    2. \"Have you seen or consulted any other health care providers outside of the [address] [place] [address] since your last visit? \" No     3. For patients aged 43-73: Has the patient had a colonoscopy / FIT/ Cologuard?  NA - based on age

## 2023-08-29 NOTE — PROGRESS NOTES
Hypertension    Assessment/Plan:     hypertension stable and needs further observation, hyperlipidemia stable and needs further observation. Lab results and schedule of future lab studies reviewed with patient. Repeat labs ordered prior to next appointment. Orders and follow up as documented in patient record. Reviewed diet, exercise and weight control. Recommended sodium restriction. Very strongly urged to quit smoking to reduce cardiovascular risk. Reviewed medications and side effects in detail. Reviewed potential future medication changes and side effects. Dawood Hodge was seen today for hypertension and cholesterol problem. Diagnoses and all orders for this visit:    Essential (primary) hypertension    Mixed hyperlipidemia    Nutritional deficiency  -     External Referral To Nutrition Services         Return in about 6 months (around 2/29/2024) for AWV, HTN, HLD, In Office. Subjective:     Jeffry Ferrell Sr. is a 80 y.o. White (non-) male who presents for follow-up of hypertension. Pt denies any SOB, edema, CP, orthopnea, palpitations, nocturnal dyspnea, headaches, or blurred vision. Diet and Lifestyle: generally follows a low fat low cholesterol diet, generally follows a low sodium diet, and sedentary  Home BP Monitoring: Is not measured at home    Cardiovascular ROS: taking medications as instructed, no medication side effects noted, no TIA's, no chest pain on exertion, no dyspnea on exertion, no swelling of ankles. Cholesterol/Risk[de-identified] existing CAD  hypertension  hyperlipidemia  sedentary lifestyle    New concerns: Pt will start yearly visits after his AWV in Feb. Lab will be TBD. Pt and wife states that he has not eating any solid foods in 5 weeks after his CABG. Pt states that he is drinking plenty of fluids, and juices, but solid foods taste sour, and is causing him to dry heave, and spit the food out.  Pt denies any mouth pain, but has not been to a dentist in years

## 2023-08-31 ENCOUNTER — HOSPITAL ENCOUNTER (OUTPATIENT)
Facility: HOSPITAL | Age: 83
Setting detail: RECURRING SERIES
End: 2023-08-31
Payer: MEDICARE

## 2023-08-31 VITALS — BODY MASS INDEX: 25.83 KG/M2 | WEIGHT: 180 LBS

## 2023-08-31 PROCEDURE — 93798 PHYS/QHP OP CAR RHAB W/ECG: CPT

## 2023-08-31 ASSESSMENT — EXERCISE STRESS TEST
PEAK_HR: 132
PEAK_METS: 3.8
PEAK_RPE: 13

## 2023-09-05 ENCOUNTER — HOSPITAL ENCOUNTER (OUTPATIENT)
Facility: HOSPITAL | Age: 83
Setting detail: RECURRING SERIES
Discharge: HOME | End: 2023-09-08
Payer: MEDICARE

## 2023-09-05 VITALS — BODY MASS INDEX: 25.83 KG/M2 | WEIGHT: 180 LBS

## 2023-09-05 PROCEDURE — 93798 PHYS/QHP OP CAR RHAB W/ECG: CPT

## 2023-09-05 ASSESSMENT — EXERCISE STRESS TEST
PEAK_METS: 3.9
PEAK_HR: 123
PEAK_RPE: 13

## 2023-09-07 ENCOUNTER — HOSPITAL ENCOUNTER (OUTPATIENT)
Facility: HOSPITAL | Age: 83
Setting detail: RECURRING SERIES
Discharge: HOME | End: 2023-09-10
Payer: MEDICARE

## 2023-09-07 VITALS — BODY MASS INDEX: 25.83 KG/M2 | WEIGHT: 180 LBS

## 2023-09-07 PROCEDURE — 93798 PHYS/QHP OP CAR RHAB W/ECG: CPT

## 2023-09-07 ASSESSMENT — EXERCISE STRESS TEST
PEAK_RPE: 14
PEAK_HR: 109
PEAK_METS: 4

## 2023-09-12 ENCOUNTER — APPOINTMENT (OUTPATIENT)
Facility: HOSPITAL | Age: 83
End: 2023-09-12
Payer: MEDICARE

## 2023-09-19 ENCOUNTER — HOSPITAL ENCOUNTER (OUTPATIENT)
Facility: HOSPITAL | Age: 83
Setting detail: RECURRING SERIES
Discharge: HOME | End: 2023-09-22
Payer: MEDICARE

## 2023-09-19 VITALS — BODY MASS INDEX: 25.68 KG/M2 | WEIGHT: 179 LBS

## 2023-09-19 PROCEDURE — 93798 PHYS/QHP OP CAR RHAB W/ECG: CPT

## 2023-09-19 ASSESSMENT — EXERCISE STRESS TEST
PEAK_HR: 126
PEAK_RPE: 15
PEAK_METS: 4

## 2023-09-21 ENCOUNTER — HOSPITAL ENCOUNTER (OUTPATIENT)
Facility: HOSPITAL | Age: 83
Setting detail: RECURRING SERIES
Discharge: HOME | End: 2023-09-24
Payer: MEDICARE

## 2023-09-21 VITALS — WEIGHT: 176 LBS | BODY MASS INDEX: 25.25 KG/M2

## 2023-09-21 PROCEDURE — 93798 PHYS/QHP OP CAR RHAB W/ECG: CPT

## 2023-09-21 ASSESSMENT — EXERCISE STRESS TEST
PEAK_HR: 120
PEAK_RPE: 15
PEAK_METS: 2.5

## 2023-09-21 NOTE — TELEPHONE ENCOUNTER
Last Visit: 8/29/23   Next Appointment: None    Requested Prescriptions     Pending Prescriptions Disp Refills    allopurinol (ZYLOPRIM) 100 MG tablet [Pharmacy Med Name: ALLOPURINOL 100MG TABLETS] 90 tablet      Sig: TAKE 1 TABLET BY MOUTH DAILY

## 2023-09-22 RX ORDER — ALLOPURINOL 100 MG/1
100 TABLET ORAL DAILY
Qty: 90 TABLET | Refills: 1 | Status: SHIPPED | OUTPATIENT
Start: 2023-09-22

## 2023-09-26 ENCOUNTER — APPOINTMENT (OUTPATIENT)
Facility: HOSPITAL | Age: 83
End: 2023-09-26
Payer: MEDICARE

## 2023-09-28 ENCOUNTER — HOSPITAL ENCOUNTER (OUTPATIENT)
Facility: HOSPITAL | Age: 83
Setting detail: RECURRING SERIES
End: 2023-09-28
Payer: MEDICARE

## 2023-09-28 PROCEDURE — 93798 PHYS/QHP OP CAR RHAB W/ECG: CPT

## 2023-09-28 ASSESSMENT — EXERCISE STRESS TEST
PEAK_RPE: 15
PEAK_HR: 112
PEAK_METS: 2.5
PEAK_METS: 2.5

## 2023-09-29 VITALS — WEIGHT: 175 LBS | BODY MASS INDEX: 25.11 KG/M2

## 2023-10-03 ENCOUNTER — HOSPITAL ENCOUNTER (OUTPATIENT)
Facility: HOSPITAL | Age: 83
Setting detail: RECURRING SERIES
Discharge: HOME | End: 2023-10-06
Payer: MEDICARE

## 2023-10-03 VITALS — BODY MASS INDEX: 25.11 KG/M2 | WEIGHT: 175 LBS

## 2023-10-03 PROCEDURE — 93798 PHYS/QHP OP CAR RHAB W/ECG: CPT

## 2023-10-03 ASSESSMENT — EXERCISE STRESS TEST
PEAK_HR: 114
PEAK_METS: 2.2
PEAK_RPE: 12

## 2023-10-05 ENCOUNTER — HOSPITAL ENCOUNTER (OUTPATIENT)
Facility: HOSPITAL | Age: 83
Setting detail: RECURRING SERIES
Discharge: HOME | End: 2023-10-08
Payer: MEDICARE

## 2023-10-05 VITALS — BODY MASS INDEX: 25.4 KG/M2 | WEIGHT: 177 LBS

## 2023-10-05 PROCEDURE — 93798 PHYS/QHP OP CAR RHAB W/ECG: CPT

## 2023-10-05 RX ORDER — AMLODIPINE BESYLATE 10 MG/1
10 TABLET ORAL DAILY
Qty: 90 TABLET | Refills: 1 | Status: SHIPPED | OUTPATIENT
Start: 2023-10-05 | End: 2023-10-11

## 2023-10-05 ASSESSMENT — EXERCISE STRESS TEST
PEAK_RPE: 12
PEAK_METS: 2.2
PEAK_HR: 120

## 2023-10-05 NOTE — TELEPHONE ENCOUNTER
Last Visit: 08- OV   Next Appointment: none      Requested Prescriptions     Pending Prescriptions Disp Refills    amLODIPine (NORVASC) 10 MG tablet [Pharmacy Med Name: AMLODIPINE BESYLATE 10MG TABLETS] 90 tablet      Sig: TAKE 1 TABLET BY MOUTH DAILY

## 2023-10-10 ENCOUNTER — HOSPITAL ENCOUNTER (OUTPATIENT)
Facility: HOSPITAL | Age: 83
Setting detail: RECURRING SERIES
Discharge: HOME | End: 2023-10-13
Payer: MEDICARE

## 2023-10-10 VITALS — BODY MASS INDEX: 25.4 KG/M2 | WEIGHT: 177 LBS

## 2023-10-10 PROCEDURE — 93798 PHYS/QHP OP CAR RHAB W/ECG: CPT

## 2023-10-10 ASSESSMENT — EXERCISE STRESS TEST
PEAK_RPE: 13
PEAK_METS: 2.5
PEAK_HR: 109

## 2023-10-12 ENCOUNTER — HOSPITAL ENCOUNTER (OUTPATIENT)
Facility: HOSPITAL | Age: 83
Setting detail: RECURRING SERIES
Discharge: HOME | End: 2023-10-15
Payer: MEDICARE

## 2023-10-12 VITALS — BODY MASS INDEX: 25.68 KG/M2 | WEIGHT: 179 LBS

## 2023-10-12 PROCEDURE — 93798 PHYS/QHP OP CAR RHAB W/ECG: CPT

## 2023-10-12 ASSESSMENT — EXERCISE STRESS TEST
PEAK_HR: 95
PEAK_RPE: 13
PEAK_METS: 2.5

## 2023-10-17 ENCOUNTER — APPOINTMENT (OUTPATIENT)
Facility: HOSPITAL | Age: 83
End: 2023-10-17
Payer: MEDICARE

## 2023-10-19 ENCOUNTER — HOSPITAL ENCOUNTER (OUTPATIENT)
Facility: HOSPITAL | Age: 83
Setting detail: RECURRING SERIES
Discharge: HOME | End: 2023-10-22
Payer: MEDICARE

## 2023-10-19 VITALS — WEIGHT: 183 LBS | BODY MASS INDEX: 26.26 KG/M2

## 2023-10-19 PROCEDURE — 93798 PHYS/QHP OP CAR RHAB W/ECG: CPT

## 2023-10-19 ASSESSMENT — EXERCISE STRESS TEST
PEAK_HR: 104
PEAK_RPE: 13
PEAK_METS: 2.5
PEAK_BP: 125/77

## 2023-10-19 ASSESSMENT — LIFESTYLE VARIABLES: SMOKELESS_TOBACCO: NO

## 2023-10-19 ASSESSMENT — EJECTION FRACTION: EF_VALUE: 48

## 2023-10-19 NOTE — PROGRESS NOTES
CARDIAC REHAB ITP REASSESSMENT FOR REVIEW AND SIGNATURE  Patient name: Alyson Sevilla Sr. : 1940     Visits from Start of Care:                                    Reporting Period: 2023-10/19/2023     Subjective Reports: Patient is progressing well, no new complaints at this time. Goals Comments   1. Incorporate Heart Healthy Lifestyle by end of program      [] met                      [] not met  [x] progressing Pt will verbalize making healthy lifestyle changes that include but not limited to eating a heart healthy diet which includes whole foods such as fresh fruits and vegetables, incorporating cardiovascular exercise at home and stress management. 2. Nutritional supplement drinks or smoothies added to meals by next recert period     [] met                      [] not met  [x] progressing Pt has lost 10 lbs since starting cardiac rehab program and has verbalized having a decreased appetite. Pt educated to add nutritional supplement drinks or smoothies to add extra nutrition not being able to be consumed with food. Pt verbalized being able to tolerate smoothies. 3. Increase endurance and stamina by end of program   [] met                      [] not met  [x] progressing Pt will be able to complete 45 minutes of exercise without c/o shortness of breath or fatigue. 4. Weekly weights at home by end of program           [] met                      [] not met  [x] progressing Pt is encouraged to have a scale at home and to monitor weight weekly. Eat a diet primarily of whole foods and monitor sodium intake. Outcomes Assessment Initial 30 day 60 day 90 day 120 day D/C   Duration     6 45  45  45       MET     1.87 3.2  4.0  4.0       RPE  10 13  14  13       Self-report Home Exercise   Min/per week     100-125 100-125 100-125  100-125              Key functional changes: Patient able to achieve same 4.0 METs and reports lower RPE with same functional workload.

## 2023-10-24 ENCOUNTER — APPOINTMENT (OUTPATIENT)
Facility: HOSPITAL | Age: 83
End: 2023-10-24
Payer: MEDICARE

## 2023-10-26 ENCOUNTER — APPOINTMENT (OUTPATIENT)
Facility: HOSPITAL | Age: 83
End: 2023-10-26
Payer: MEDICARE

## 2023-10-31 ENCOUNTER — APPOINTMENT (OUTPATIENT)
Facility: HOSPITAL | Age: 83
End: 2023-10-31
Payer: MEDICARE

## 2023-10-31 LAB — LEFT VENTRICULAR EJECTION FRACTION, EXTERNAL: 40

## 2023-11-03 RX ORDER — LOSARTAN POTASSIUM 100 MG/1
100 TABLET ORAL DAILY
Qty: 90 TABLET | Refills: 1 | Status: SHIPPED | OUTPATIENT
Start: 2023-11-03

## 2023-11-03 NOTE — TELEPHONE ENCOUNTER
Last Visit: 08- OV   Next Appointment: none  Previous Refill Encounter: 10- #90tabs with 3 refills      Requested Prescriptions     Pending Prescriptions Disp Refills    losartan (COZAAR) 100 MG tablet [Pharmacy Med Name: LOSARTAN 100MG TABLETS] 90 tablet      Sig: TAKE 1 TABLET BY MOUTH DAILY

## 2023-11-06 ENCOUNTER — OFFICE VISIT (OUTPATIENT)
Age: 83
End: 2023-11-06
Payer: MEDICARE

## 2023-11-06 VITALS
HEART RATE: 83 BPM | OXYGEN SATURATION: 92 % | HEIGHT: 70 IN | RESPIRATION RATE: 18 BRPM | DIASTOLIC BLOOD PRESSURE: 75 MMHG | TEMPERATURE: 97.3 F | SYSTOLIC BLOOD PRESSURE: 109 MMHG | WEIGHT: 192.2 LBS | BODY MASS INDEX: 27.52 KG/M2

## 2023-11-06 DIAGNOSIS — I46.9 CARDIAC ARREST (HCC): ICD-10-CM

## 2023-11-06 DIAGNOSIS — Z91.81 AT HIGH RISK FOR FALLS: ICD-10-CM

## 2023-11-06 DIAGNOSIS — Z99.81 REQUIRES OXYGEN THERAPY: ICD-10-CM

## 2023-11-06 DIAGNOSIS — I48.0 PAROXYSMAL ATRIAL FIBRILLATION (HCC): ICD-10-CM

## 2023-11-06 DIAGNOSIS — Z09 HOSPITAL DISCHARGE FOLLOW-UP: Primary | ICD-10-CM

## 2023-11-06 DIAGNOSIS — K81.0 ACUTE CHOLECYSTITIS: ICD-10-CM

## 2023-11-06 DIAGNOSIS — A41.9 SEPSIS, DUE TO UNSPECIFIED ORGANISM, UNSPECIFIED WHETHER ACUTE ORGAN DYSFUNCTION PRESENT (HCC): ICD-10-CM

## 2023-11-06 PROCEDURE — 99214 OFFICE O/P EST MOD 30 MIN: CPT | Performed by: NURSE PRACTITIONER

## 2023-11-06 PROCEDURE — 1036F TOBACCO NON-USER: CPT | Performed by: NURSE PRACTITIONER

## 2023-11-06 PROCEDURE — G8427 DOCREV CUR MEDS BY ELIG CLIN: HCPCS | Performed by: NURSE PRACTITIONER

## 2023-11-06 PROCEDURE — G8417 CALC BMI ABV UP PARAM F/U: HCPCS | Performed by: NURSE PRACTITIONER

## 2023-11-06 PROCEDURE — 1123F ACP DISCUSS/DSCN MKR DOCD: CPT | Performed by: NURSE PRACTITIONER

## 2023-11-06 PROCEDURE — 3078F DIAST BP <80 MM HG: CPT | Performed by: NURSE PRACTITIONER

## 2023-11-06 PROCEDURE — 3074F SYST BP LT 130 MM HG: CPT | Performed by: NURSE PRACTITIONER

## 2023-11-06 PROCEDURE — G8484 FLU IMMUNIZE NO ADMIN: HCPCS | Performed by: NURSE PRACTITIONER

## 2023-11-06 RX ORDER — NITROGLYCERIN 0.4 MG/1
0.4 TABLET SUBLINGUAL EVERY 5 MIN PRN
COMMUNITY
Start: 2023-10-26

## 2023-11-06 RX ORDER — MIDODRINE HYDROCHLORIDE 5 MG/1
5 TABLET ORAL PRN
COMMUNITY
Start: 2023-11-01

## 2023-11-06 RX ORDER — CARVEDILOL 6.25 MG/1
6.25 TABLET ORAL 2 TIMES DAILY WITH MEALS
COMMUNITY
Start: 2023-11-02

## 2023-11-06 SDOH — ECONOMIC STABILITY: FOOD INSECURITY: WITHIN THE PAST 12 MONTHS, THE FOOD YOU BOUGHT JUST DIDN'T LAST AND YOU DIDN'T HAVE MONEY TO GET MORE.: NEVER TRUE

## 2023-11-06 SDOH — ECONOMIC STABILITY: INCOME INSECURITY: HOW HARD IS IT FOR YOU TO PAY FOR THE VERY BASICS LIKE FOOD, HOUSING, MEDICAL CARE, AND HEATING?: NOT HARD AT ALL

## 2023-11-06 SDOH — ECONOMIC STABILITY: FOOD INSECURITY: WITHIN THE PAST 12 MONTHS, YOU WORRIED THAT YOUR FOOD WOULD RUN OUT BEFORE YOU GOT MONEY TO BUY MORE.: NEVER TRUE

## 2023-11-06 SDOH — ECONOMIC STABILITY: HOUSING INSECURITY
IN THE LAST 12 MONTHS, WAS THERE A TIME WHEN YOU DID NOT HAVE A STEADY PLACE TO SLEEP OR SLEPT IN A SHELTER (INCLUDING NOW)?: YES

## 2023-11-06 ASSESSMENT — PATIENT HEALTH QUESTIONNAIRE - PHQ9
1. LITTLE INTEREST OR PLEASURE IN DOING THINGS: 0
SUM OF ALL RESPONSES TO PHQ QUESTIONS 1-9: 0
SUM OF ALL RESPONSES TO PHQ9 QUESTIONS 1 & 2: 0
SUM OF ALL RESPONSES TO PHQ QUESTIONS 1-9: 0
2. FEELING DOWN, DEPRESSED OR HOPELESS: 0

## 2023-11-06 NOTE — PROGRESS NOTES
1. \"Have you been to the ER, urgent care clinic since your last visit? Hospitalized since your last visit? \"  [facility] 10/21/23 for sepsis    2. \"Have you seen or consulted any other health care providers outside of the [address] [place] [address] since your last visit? \" No     3. For patients aged 43-73: Has the patient had a colonoscopy / FIT/ Cologuard?  NA - based on age
Oral, 2 TIMES DAILY PRN        PHYSICAL EXAM  Physical Exam  Vitals and nursing note reviewed. Constitutional:       Appearance: Normal appearance. He is ill-appearing. Cardiovascular:      Rate and Rhythm: Normal rate. Pulses: Normal pulses. Heart sounds: Normal heart sounds. Pulmonary:      Effort: Pulmonary effort is normal. Prolonged expiration present. Breath sounds: Decreased air movement present. Examination of the right-upper field reveals decreased breath sounds. Examination of the left-upper field reveals decreased breath sounds. Examination of the right-middle field reveals decreased breath sounds. Examination of the left-middle field reveals decreased breath sounds. Examination of the right-lower field reveals decreased breath sounds. Examination of the left-lower field reveals decreased breath sounds. Decreased breath sounds present. Comments: Patient not wearing oxygen during visit, looks visibly short of breath at rest.  O2 sats 92% on room air. Patient was educated on the need for wearing oxygen at all times, until he is reevaluated via pulmonary. Abdominal:      Tenderness: There is no abdominal tenderness. Comments: Cholecystotomy tube in place site dry and intact draining serous fluid. Musculoskeletal:         General: Normal range of motion. Cervical back: Normal range of motion and neck supple. Skin:     General: Skin is warm and dry. Neurological:      General: No focal deficit present. Mental Status: He is alert and oriented to person, place, and time. Psychiatric:         Mood and Affect: Mood normal.         Behavior: Behavior normal.           Disclaimer: The patient understands our medical plan. Alternatives have been explained and offered. The risks, benefits and significant side effects of all medications have been reviewed. Anticipated time course and progression of condition reviewed. All questions have been addressed.   He is

## 2023-11-17 ENCOUNTER — TELEPHONE (OUTPATIENT)
Age: 83
End: 2023-11-17

## 2023-11-17 NOTE — TELEPHONE ENCOUNTER
Scout Cohn, with [address] [facility], called to advise provider that patient has documented missed visit for today.

## 2023-11-18 PROBLEM — C67.9 MALIGNANT NEOPLASM OF URINARY BLADDER (HCC): Status: ACTIVE | Noted: 2023-11-18

## 2023-11-18 PROBLEM — K80.50 CHOLEDOCHOLITHIASIS: Status: ACTIVE | Noted: 2023-10-27

## 2023-11-18 PROBLEM — I21.A1 TYPE 2 MYOCARDIAL INFARCTION (HCC): Status: ACTIVE | Noted: 2023-10-24

## 2023-11-18 PROBLEM — I25.10 CORONARY ATHEROSCLEROSIS: Status: ACTIVE | Noted: 2023-10-27

## 2023-11-18 PROBLEM — C43.9 MELANOMA OF SKIN (HCC): Status: ACTIVE | Noted: 2023-11-18

## 2023-11-18 PROBLEM — M77.8 TENDINITIS OF RIGHT SHOULDER: Status: ACTIVE | Noted: 2018-01-30

## 2023-11-18 PROBLEM — I46.9 CARDIAC ARREST (HCC): Status: ACTIVE | Noted: 2023-11-18

## 2023-11-18 PROBLEM — A41.9 SEPSIS (HCC): Status: ACTIVE | Noted: 2023-11-18

## 2023-11-18 PROBLEM — A49.8 E COLI INFECTION: Status: ACTIVE | Noted: 2023-11-18

## 2023-11-18 PROBLEM — N20.0 NEPHROLITHIASIS: Status: ACTIVE | Noted: 2021-10-27

## 2023-11-18 PROBLEM — G89.29 CHRONIC RIGHT SHOULDER PAIN: Status: ACTIVE | Noted: 2018-01-30

## 2023-11-18 PROBLEM — I45.9 HEART BLOCK: Status: ACTIVE | Noted: 2023-11-18

## 2023-11-18 PROBLEM — T85.79XA: Status: ACTIVE | Noted: 2023-11-18

## 2023-11-18 PROBLEM — I21.4 NSTEMI (NON-ST ELEVATED MYOCARDIAL INFARCTION) (HCC): Status: ACTIVE | Noted: 2023-06-04

## 2023-11-18 PROBLEM — E55.9 VITAMIN D DEFICIENCY: Status: ACTIVE | Noted: 2022-02-01

## 2023-11-18 PROBLEM — A41.9 SEPTIC SHOCK (HCC): Status: ACTIVE | Noted: 2023-10-22

## 2023-11-18 PROBLEM — I12.9 MALIGNANT HYPERTENSIVE KIDNEY DISEASE WITH CHRONIC KIDNEY DISEASE STAGE I THROUGH STAGE IV, OR UNSPECIFIED: Status: RESOLVED | Noted: 2021-10-27 | Resolved: 2023-11-18

## 2023-11-18 PROBLEM — E78.00 PURE HYPERCHOLESTEROLEMIA: Status: ACTIVE | Noted: 2023-11-18

## 2023-11-18 PROBLEM — M19.90 OSTEOARTHROSIS, UNSPECIFIED WHETHER GENERALIZED OR LOCALIZED, OTHER SPECIFIED SITES: Status: ACTIVE | Noted: 2023-11-18

## 2023-11-18 PROBLEM — Z99.81 REQUIRES OXYGEN THERAPY: Status: ACTIVE | Noted: 2023-11-18

## 2023-11-18 PROBLEM — I25.5 ISCHEMIC CARDIOMYOPATHY: Status: ACTIVE | Noted: 2023-10-27

## 2023-11-18 PROBLEM — M19.90 OSTEOARTHROSIS, UNSPECIFIED WHETHER GENERALIZED OR LOCALIZED, OTHER SPECIFIED SITES: Status: RESOLVED | Noted: 2023-11-18 | Resolved: 2023-11-18

## 2023-11-18 PROBLEM — K83.09 ASCENDING CHOLANGITIS: Status: ACTIVE | Noted: 2023-10-27

## 2023-11-18 PROBLEM — M25.511 CHRONIC RIGHT SHOULDER PAIN: Status: ACTIVE | Noted: 2018-01-30

## 2023-11-18 PROBLEM — Z91.81 AT HIGH RISK FOR FALLS: Status: ACTIVE | Noted: 2023-11-18

## 2023-11-18 PROBLEM — I12.9 MALIGNANT HYPERTENSIVE KIDNEY DISEASE WITH CHRONIC KIDNEY DISEASE STAGE I THROUGH STAGE IV, OR UNSPECIFIED: Status: ACTIVE | Noted: 2021-10-27

## 2023-11-18 PROBLEM — R07.9 CHEST PAIN: Status: ACTIVE | Noted: 2023-06-03

## 2023-11-18 PROBLEM — D73.5 PERISPLENIC HEMATOMA: Status: ACTIVE | Noted: 2023-01-02

## 2023-11-18 PROBLEM — K62.5 PAINLESS RECTAL BLEEDING: Status: ACTIVE | Noted: 2018-11-29

## 2023-11-18 PROBLEM — R65.21 SEPTIC SHOCK (HCC): Status: ACTIVE | Noted: 2023-10-22

## 2023-11-18 PROBLEM — Q61.02 MULTIPLE RENAL CYSTS: Status: ACTIVE | Noted: 2022-02-01

## 2023-11-18 PROBLEM — I50.43 ACUTE ON CHRONIC COMBINED SYSTOLIC AND DIASTOLIC HEART FAILURE (HCC): Status: ACTIVE | Noted: 2023-10-27

## 2023-11-18 PROBLEM — M19.019 AC JOINT ARTHROPATHY: Status: ACTIVE | Noted: 2018-01-30

## 2023-12-04 RX ORDER — CARVEDILOL 6.25 MG/1
6.25 TABLET ORAL 2 TIMES DAILY WITH MEALS
Qty: 60 TABLET | OUTPATIENT
Start: 2023-12-04

## 2023-12-04 NOTE — TELEPHONE ENCOUNTER
Medication not previously prescribed by MAC Juarez, patient stating discussed at last visit.    Last Visit: 11/6/23   Next Appointment: None

## 2023-12-04 NOTE — TELEPHONE ENCOUNTER
Patient requesting medication refill states medication discuss during last visit     carvedilol (COREG) 6.25 MG tablet       [id number] - [place], [place] - [address] [phone] - F [phone] [address] [address]  Phone: [phone]  Fax: [phone]

## 2023-12-06 ENCOUNTER — TELEPHONE (OUTPATIENT)
Age: 83
End: 2023-12-06

## 2023-12-06 NOTE — TELEPHONE ENCOUNTER
Left a vm for patient stating they must make an appt with Neptali Jansen in order to discuss medication refill for carvedilol (COREG) 6.25 MG tablet

## 2024-01-04 ENCOUNTER — TELEPHONE (OUTPATIENT)
Age: 84
End: 2024-01-04

## 2024-01-04 NOTE — TELEPHONE ENCOUNTER
Returned call and verified with patient that per MAC Juarez, medication was discontinued by Urologist and he would need to contact them to find out why. Patient voiced understanding.

## 2024-01-04 NOTE — TELEPHONE ENCOUNTER
Patient friend called (Yeimi Flores) stating patient received medication from pharmacy (Amlodipine) on 01/02/24 and not sure if patient should be taking advised that medication has been discontinued requesting call from nurse for clarification also has been advised that patient will need to be present due to MsFlorina Sandra not being on permission to release.     Contact number [phone]

## 2024-02-09 ENCOUNTER — OFFICE VISIT (OUTPATIENT)
Age: 84
End: 2024-02-09

## 2024-02-09 VITALS
OXYGEN SATURATION: 97 % | RESPIRATION RATE: 16 BRPM | DIASTOLIC BLOOD PRESSURE: 86 MMHG | TEMPERATURE: 97.5 F | HEART RATE: 93 BPM | HEIGHT: 70 IN | WEIGHT: 166 LBS | BODY MASS INDEX: 23.77 KG/M2 | SYSTOLIC BLOOD PRESSURE: 135 MMHG

## 2024-02-09 DIAGNOSIS — Z09 HOSPITAL DISCHARGE FOLLOW-UP: ICD-10-CM

## 2024-02-09 DIAGNOSIS — I26.99 OTHER PULMONARY EMBOLISM WITHOUT ACUTE COR PULMONALE, UNSPECIFIED CHRONICITY (HCC): ICD-10-CM

## 2024-02-09 DIAGNOSIS — I48.0 PAROXYSMAL ATRIAL FIBRILLATION (HCC): ICD-10-CM

## 2024-02-09 DIAGNOSIS — I50.43 ACUTE ON CHRONIC COMBINED SYSTOLIC AND DIASTOLIC HEART FAILURE (HCC): ICD-10-CM

## 2024-02-09 DIAGNOSIS — M10.9 GOUT, UNSPECIFIED CAUSE, UNSPECIFIED CHRONICITY, UNSPECIFIED SITE: ICD-10-CM

## 2024-02-09 DIAGNOSIS — N18.32 CHRONIC KIDNEY DISEASE, STAGE 3B (HCC): Primary | ICD-10-CM

## 2024-02-09 DIAGNOSIS — C67.9 MALIGNANT NEOPLASM OF URINARY BLADDER, UNSPECIFIED SITE (HCC): ICD-10-CM

## 2024-02-09 RX ORDER — CIPROFLOXACIN 500 MG/1
500 TABLET, FILM COATED ORAL EVERY 12 HOURS
COMMUNITY
Start: 2024-02-06 | End: 2024-02-09

## 2024-02-09 RX ORDER — METRONIDAZOLE 500 MG/1
500 TABLET ORAL EVERY 8 HOURS
COMMUNITY
Start: 2024-02-06 | End: 2024-02-09

## 2024-02-09 RX ORDER — ALLOPURINOL 100 MG/1
100 TABLET ORAL DAILY
Qty: 90 TABLET | Refills: 1 | Status: SHIPPED | OUTPATIENT
Start: 2024-02-09

## 2024-02-09 RX ORDER — OMEPRAZOLE 20 MG/1
40 TABLET, DELAYED RELEASE ORAL DAILY
COMMUNITY
Start: 2024-02-06 | End: 2024-03-07

## 2024-02-09 NOTE — PROGRESS NOTES
1. \"Have you been to the ER, urgent care clinic since your last visit?  Hospitalized since your last visit?\"  Sarah 1/31/24 for duodenal perforation     2. \"Have you seen or consulted any other health care providers outside of the [facility] since your last visit?\" No     3. For patients aged 45-75: Has the patient had a colonoscopy / FIT/ Cologuard? NA - based on age      
cholecystitis, presented to the hospital for elective ERCP and subsequently had a duodenal perforation. Patient denies any current complaints. Patient denies any chest pain, shortness of breath, nausea, vomiting, lightheadedness, dizziness. Patient lives at home with his wife and stepson. Patient uses a walker at baseline.     Hospital course as described below:    Pt admitted for above sp duodenal perf from ERCP sp clipping by GI. Followed by GI/GS during stay. Had UGI series no e/o further leaking - tolerating PO intake. Cleared by GI/GS for DC    Pt felt to have achieved maximal benefit from hospitalization. Pt is hemodynamically stable and medically cleared for DC. All questions answered.     Patient Active Problem List   Diagnosis    Pacemaker    Essential (primary) hypertension    Mixed hyperlipidemia    Advance directive discussed with patient    Permanent atrial fibrillation (HCC)    Pulmonary embolus (HCC)    Gout, unspecified    Bladder neck contracture    Venous insufficiency    Prostate cancer (HCC)    Bradycardia    Nutritional deficiency    Sepsis (HCC)    Cardiac arrest (HCC)    At high risk for falls    Requires oxygen therapy    AC joint arthropathy    Acute blood loss anemia    Acute on chronic combined systolic and diastolic heart failure (HCC)    Ascending cholangitis    Chest pain    Choledocholithiasis    Chronic right shoulder pain    Coronary atherosclerosis    E coli infection    Heart block    History of total right knee replacement    Infection of cholecystostomy drain (HCC)    Ischemic cardiomyopathy    Melanoma of skin (HCC)    Multiple renal cysts    Nephrolithiasis    Painless rectal bleeding    Perisplenic hematoma    Primary osteoarthritis of left knee    Pure hypercholesterolemia    Septic shock (HCC)    Snoring    Somnolence    Tendinitis of right shoulder    Vitamin D deficiency    Malignant neoplasm of urinary bladder (HCC)    NSTEMI (non-ST elevated myocardial infarction) ([facility]) 
Quintanilla Rogers Faces Scale (Patients 3yr old and older)

## 2024-03-01 ENCOUNTER — TELEMEDICINE (OUTPATIENT)
Age: 84
End: 2024-03-01
Payer: MEDICARE

## 2024-03-01 DIAGNOSIS — E78.2 MIXED HYPERLIPIDEMIA: ICD-10-CM

## 2024-03-01 DIAGNOSIS — I10 ESSENTIAL (PRIMARY) HYPERTENSION: ICD-10-CM

## 2024-03-01 DIAGNOSIS — M10.9 GOUT, UNSPECIFIED CAUSE, UNSPECIFIED CHRONICITY, UNSPECIFIED SITE: ICD-10-CM

## 2024-03-01 DIAGNOSIS — Z00.00 MEDICARE ANNUAL WELLNESS VISIT, SUBSEQUENT: Primary | ICD-10-CM

## 2024-03-01 PROBLEM — I46.9 CARDIAC ARREST (HCC): Status: RESOLVED | Noted: 2023-11-18 | Resolved: 2024-03-01

## 2024-03-01 PROCEDURE — G8427 DOCREV CUR MEDS BY ELIG CLIN: HCPCS | Performed by: NURSE PRACTITIONER

## 2024-03-01 PROCEDURE — 99214 OFFICE O/P EST MOD 30 MIN: CPT | Performed by: NURSE PRACTITIONER

## 2024-03-01 PROCEDURE — G8420 CALC BMI NORM PARAMETERS: HCPCS | Performed by: NURSE PRACTITIONER

## 2024-03-01 PROCEDURE — G8484 FLU IMMUNIZE NO ADMIN: HCPCS | Performed by: NURSE PRACTITIONER

## 2024-03-01 PROCEDURE — 1036F TOBACCO NON-USER: CPT | Performed by: NURSE PRACTITIONER

## 2024-03-01 PROCEDURE — 1123F ACP DISCUSS/DSCN MKR DOCD: CPT | Performed by: NURSE PRACTITIONER

## 2024-03-01 PROCEDURE — G0439 PPPS, SUBSEQ VISIT: HCPCS | Performed by: NURSE PRACTITIONER

## 2024-03-01 ASSESSMENT — PATIENT HEALTH QUESTIONNAIRE - PHQ9
SUM OF ALL RESPONSES TO PHQ QUESTIONS 1-9: 0
SUM OF ALL RESPONSES TO PHQ QUESTIONS 1-9: 0
1. LITTLE INTEREST OR PLEASURE IN DOING THINGS: 0
SUM OF ALL RESPONSES TO PHQ QUESTIONS 1-9: 0
SUM OF ALL RESPONSES TO PHQ QUESTIONS 1-9: 0
SUM OF ALL RESPONSES TO PHQ9 QUESTIONS 1 & 2: 0
2. FEELING DOWN, DEPRESSED OR HOPELESS: 0

## 2024-03-01 NOTE — PROGRESS NOTES
Medicare Annual Wellness Visit    Negro Mederos Sr. is here for Medicare AWV, Hypertension, and Cholesterol Problem    Assessment & Plan   Medicare annual wellness visit, subsequent  Gout, unspecified cause, unspecified chronicity, unspecified site  Essential (primary) hypertension  Mixed hyperlipidemia    Recommendations for Preventive Services Due: see orders and patient instructions/AVS.  Recommended screening schedule for the next 5-10 years is provided to the patient in written form: see Patient Instructions/AVS.     Return in 1 year (on 3/1/2025).     Subjective   The following acute and/or chronic problems were also addressed today:  See Notes    Patient's complete Health Risk Assessment and screening values have been reviewed and are found in Flowsheets. The following problems were reviewed today and where indicated follow up appointments were made and/or referrals ordered.    Positive Risk Factor Screenings with Interventions:                Activity, Diet, and Weight:  On average, how many days per week do you engage in moderate to strenuous exercise (like a brisk walk)?: 1 day  On average, how many minutes do you engage in exercise at this level?: 20 min    Do you eat balanced/healthy meals regularly?: (!) No    There is no height or weight on file to calculate BMI.    Do you eat balanced/healthy meals regularly Interventions:  See AVS for additional education material        Dentist Screen:  Have you seen the dentist within the past year?: (!) No    Intervention:  Advised to schedule with their dentist     Vision Screen:  Do you have difficulty driving, watching TV, or doing any of your daily activities because of your eyesight?: No  Have you had an eye exam within the past year?: (!) No  No results found.    Interventions:   See AVS for additional education material              Objective      Patient-Reported Vitals  No data recorded        Allergies   Allergen Reactions

## 2024-03-01 NOTE — PROGRESS NOTES
Virtual Visit    Assessment/Plan:   Below is the assessment and plan developed based on review of pertinent history, physical exam, labs, studies, and medications.    1. Medicare annual wellness visit, subsequent  2. Gout, unspecified cause, unspecified chronicity, unspecified site  3. Essential (primary) hypertension  4. Mixed hyperlipidemia     Follow-up and Dispositions    Return in 1 year (on 3/1/2025).       Subjective:     Negro is a 83 y.o. y.o. male who complains of   Chief Complaint   Patient presents with    Medicare AWV    Hypertension    Cholesterol Problem      Hypertension/Cholesterol Review:    Diet and Lifestyle: generally follows a low fat low cholesterol diet, generally follows a low sodium diet, sedentary  Home BP Monitoring: is not measured at home    Cardiovascular ROS: taking medications as instructed, no medication side effects noted, no TIA's, no chest pain on exertion, no dyspnea on exertion, noting swelling of ankles.     atorvastatin - 40 MG   New concerns: Pt will start his yearly AWV's and will return in March 2025    hypertension stable, hyperlipidemia stable, coronary artery disease  sees cardiology .    lab results and schedule of future lab studies reviewed with patient  repeat labs ordered prior to next appointment  orders and follow up as documented in patient record  reviewed diet, exercise and weight control  recommended sodium restriction  copy of written low fat low cholesterol diet provided and reviewed  cardiovascular risk and specific lipid/LDL goals reviewed  reviewed medications and side effects in detail  reviewed potential future medication changes and side effects.     Objective:     Patient-Reported Vitals  No data recorded        Negro Mederos Sr., was evaluated through a synchronous (real-time) audio-video encounter. The patient (or guardian if applicable) is aware that this is a billable service, which includes applicable co-pays. This Virtual Visit was

## 2024-03-01 NOTE — PATIENT INSTRUCTIONS
breads, oatmeal, beans, brown rice, citrus fruits, and apples.     Eat lean proteins. Heart-healthy proteins include seafood, lean meats and poultry, eggs, beans, peas, nuts, seeds, and soy products.     Limit drinks and foods with added sugar. These include candy, desserts, and soda pop.   Lifestyle changes    If your doctor recommends it, get more exercise. Walking is a good choice. Bit by bit, increase the amount you walk every day. Try for at least 30 minutes on most days of the week. You also may want to swim, bike, or do other activities.     Do not smoke. If you need help quitting, talk to your doctor about stop-smoking programs and medicines. These can increase your chances of quitting for good. Quitting smoking may be the most important step you can take to protect your heart. It is never too late to quit.     Limit alcohol to 2 drinks a day for men and 1 drink a day for women. Too much alcohol can cause health problems.     Manage other health problems such as diabetes, high blood pressure, and high cholesterol. If you think you may have a problem with alcohol or drug use, talk to your doctor.   Medicines    Take your medicines exactly as prescribed. Call your doctor if you think you are having a problem with your medicine.     If your doctor recommends aspirin, take the amount directed each day. Make sure you take aspirin and not another kind of pain reliever, such as acetaminophen (Tylenol).   When should you call for help?   Call 911 if you have symptoms of a heart attack. These may include:    Chest pain or pressure, or a strange feeling in the chest.     Sweating.     Shortness of breath.     Pain, pressure, or a strange feeling in the back, neck, jaw, or upper belly or in one or both shoulders or arms.     Lightheadedness or sudden weakness.     A fast or irregular heartbeat.   After you call 911, the  may tell you to chew 1 adult-strength or 2 to 4 low-dose aspirin. Wait for an ambulance. Do

## 2024-04-03 DIAGNOSIS — M10.9 GOUT, UNSPECIFIED CAUSE, UNSPECIFIED CHRONICITY, UNSPECIFIED SITE: ICD-10-CM

## 2024-04-03 RX ORDER — AMLODIPINE BESYLATE 10 MG/1
10 TABLET ORAL DAILY
Qty: 90 TABLET | Refills: 1 | OUTPATIENT
Start: 2024-04-03

## 2024-04-03 RX ORDER — ALLOPURINOL 100 MG/1
100 TABLET ORAL DAILY
Qty: 90 TABLET | Refills: 1 | Status: SHIPPED | OUTPATIENT
Start: 2024-04-03

## 2024-04-03 NOTE — TELEPHONE ENCOUNTER
Last Visit: 3/1/24   Next Appointment: none    Requested Prescriptions     Pending Prescriptions Disp Refills    amLODIPine (NORVASC) 10 MG tablet [Pharmacy Med Name: AMLODIPINE BESYLATE 10MG TABLETS] 90 tablet 1     Sig: TAKE 1 TABLET BY MOUTH DAILY

## 2024-05-22 PROBLEM — I71.43 INFRARENAL ABDOMINAL AORTIC ANEURYSM (AAA) WITHOUT RUPTURE (HCC): Status: ACTIVE | Noted: 2024-05-22

## 2024-09-26 ENCOUNTER — TELEPHONE (OUTPATIENT)
Facility: CLINIC | Age: 84
End: 2024-09-26

## 2024-10-03 ENCOUNTER — CARE COORDINATION (OUTPATIENT)
Facility: CLINIC | Age: 84
End: 2024-10-03

## 2024-10-03 ENCOUNTER — TELEPHONE (OUTPATIENT)
Facility: CLINIC | Age: 84
End: 2024-10-03

## 2024-10-03 NOTE — TELEPHONE ENCOUNTER
----- Message from Amber DOYLE sent at 10/2/2024 11:13 AM EDT -----  Regarding: ECC Appointment Request  ECC Appointment Request    Patient needs appointment for ECC Appointment Type: Hospital Follow Up.    Patient Requested Dates(s):  Patient Requested Time:  Provider Name: Ann RickyHILTON Patel NP    Reason for Appointment Request: Other //Pt will be discharge today  10/02/2024 and needs to have a Hospital Follow up appt schedule within one to two weeks.   --------------------------------------------------------------------------------------------------------------------------    Relationship to Patient: Covered Entity Trina CUR[facility]    Call Back Information: OK to leave message on voicemail  Preferred Call Back Number: Phone  [phone]

## 2024-10-03 NOTE — CARE COORDINATION
Care Transitions Note    Initial Call - Call within 2 business days of discharge: Yes    Attempted to reach patient for transitions of care follow up. Unable to reach patient.    Outreach Attempts:   HIPAA compliant voicemail left for patient.   PHI release form is not on file.    Patient: Negro Mederos Sr.    Patient : 1940   MRN: [id number]    Reason for Admission: Acute Bilateral Lower Extremity DVT  Admission Date: 24  Discharge Date:   10/2/24 RURS: No data recorded      Was this an external facility discharge? Yes. Discharge Date: 10/2/24. Facility Name: [facility]    Follow Up Appointment:   Patient does not have a follow up appointment scheduled at time of call.  PCP office attempted to contact patient and LVMM today to schedule MAGEN appointment.  Future Appointments         Provider Specialty Dept Phone    10/22/2024 11:00 AM Siomara Villa PA Urology [phone]    10/24/2024 10:00 AM Balbir Cleary MD      10/31/2024 10:00 AM Ricky Juarez APRN - NP Family Medicine [phone]            Balbir Cleary MD  Nephrology [phone] [facility] [address]      Appointment: 10/24/2024 10:00 AM       Plan for follow-up on next business day.      Laura Patrick RN

## 2024-10-04 ENCOUNTER — CARE COORDINATION (OUTPATIENT)
Facility: CLINIC | Age: 84
End: 2024-10-04

## 2024-10-04 DIAGNOSIS — I87.2 VENOUS INSUFFICIENCY: ICD-10-CM

## 2024-10-04 DIAGNOSIS — I10 ESSENTIAL (PRIMARY) HYPERTENSION: Primary | ICD-10-CM

## 2024-10-04 DIAGNOSIS — I25.5 ISCHEMIC CARDIOMYOPATHY: ICD-10-CM

## 2024-10-04 DIAGNOSIS — I48.21 PERMANENT ATRIAL FIBRILLATION (HCC): ICD-10-CM

## 2024-10-04 DIAGNOSIS — N18.32 CHRONIC KIDNEY DISEASE, STAGE 3B (HCC): ICD-10-CM

## 2024-10-04 DIAGNOSIS — M10.9 GOUT, UNSPECIFIED CAUSE, UNSPECIFIED CHRONICITY, UNSPECIFIED SITE: ICD-10-CM

## 2024-10-04 DIAGNOSIS — E78.2 MIXED HYPERLIPIDEMIA: ICD-10-CM

## 2024-10-04 DIAGNOSIS — E55.9 VITAMIN D DEFICIENCY: ICD-10-CM

## 2024-10-04 PROCEDURE — 1111F DSCHRG MED/CURRENT MED MERGE: CPT | Performed by: NURSE PRACTITIONER

## 2024-10-04 RX ORDER — FERROUS SULFATE 325(65) MG
1 TABLET ORAL DAILY
COMMUNITY
Start: 2024-10-02

## 2024-10-04 RX ORDER — M-VIT,TX,IRON,MINS/CALC/FOLIC 27MG-0.4MG
1 TABLET ORAL DAILY
COMMUNITY

## 2024-10-04 NOTE — CARE COORDINATION
nurse line at [facility] [place] office reviewing concerns noted in yellow box and requested a return call.     Care Summary Note: Patient denies any pain, redness, or swelling r/t DVT. 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 Eliquis cost $126 for one week, will cost an additional $600 to obtain the remainder of the 30 days supply, and will not be affordable. He notes he was not taking Xarelto consistently with meals due to not being aware that it would increase efficacy when taken with food.    Appt with hematology Dr. Hernandez on 11/8/24 @ 11:30 am. He will cancel his urology appt due to no needs since Glez was removed.    Care Transition Nurse reviewed discharge instructions, medical action plan, and red flags with patient. The patient and spouse/partner was given an opportunity to ask questions; questions regarding magnesium and Eliquis vs Xarelto sent to provider for clarification.. The patient and spouse/partner verbalized understanding.  and demonstrated understanding using teach back method.   Were discharge instructions available to patient? Yes.   Reviewed appropriate site of care based on symptoms and resources available to patient including: PCP  Specialist  When to call 911  CTN . The patient and spouse/partner agrees to contact the primary care provider and/or specialist office for questions related to their healthcare.      Advance Care Planning:   Does patient have an Advance Directive: Not on file; patient encouraged to bring existing ACP documents to a [facility] facility..    Medication Reconciliation:  Medication reconciliation was performed with spouse/partner,1111F entered: yes.     Remote Patient Monitoring:  Offered patient enrollment in the Remote Patient Monitoring (RPM) program for in-home monitoring: Yes, but did not enroll at this time: controlled chronic disease

## 2024-10-08 ENCOUNTER — CARE COORDINATION (OUTPATIENT)
Facility: CLINIC | Age: 84
End: 2024-10-08

## 2024-10-08 NOTE — CARE COORDINATION
Care Transitions Note    Follow Up Call     Patient Current Location:  [place]    Care Transition Nurse contacted the patient by telephone. Verified name and  as identifiers.    Additional needs identified to be addressed with provider   No needs identified                 Method of communication with provider: none.    Care Summary Note: Patient reports feeling well. He denies any pain or SOB/BERMEO with walking. His girlfriend Yeimi spoke to [facility] this morning about concerns r/t Eliquis cost. He tried to use a 30 day free supply coupon at the pharmacy but it was not valid.     Plan of care updates since last contact:  Review of patient management of conditions/medications: Confirmed patient's SO spoke to cardiology re: Eliquis. He is not sure if they discussed magnesium since Yeimi manages his medications. Reviewed that there was a question as to whether he should be taking magnesium. He will obtain labs this Thursday as ordered by nephrology. Confirmed plan for patient to obtain the remainder of the 30 day supply of Eliquis and will discuss cost concerns at his upcoming appointments and need for refill vs switching to alternative OAC.        Advance Care Planning:   Does patient have an Advance Directive: reviewed during previous call, see note. .    Medication Review:  Full medication reconciliation completed during previous call. and No changes since last call.     Remote Patient Monitoring:  Offered patient enrollment in the Remote Patient Monitoring (RPM) program for in-home monitoring: Yes, but did not enroll at this time: controlled chronic disease management.    Assessments:  Care Transitions Subsequent and Final Call    Subsequent and Final Calls  Do you have any ongoing symptoms?: No  Have your medications changed?: No  Do you have any questions related to your medications?: No  Do you currently have any active services?: No  Do you have any needs or concerns that I can assist you with?:

## 2024-10-11 DIAGNOSIS — M10.9 GOUT, UNSPECIFIED CAUSE, UNSPECIFIED CHRONICITY, UNSPECIFIED SITE: ICD-10-CM

## 2024-10-11 RX ORDER — ALLOPURINOL 100 MG/1
100 TABLET ORAL DAILY
Qty: 90 TABLET | Refills: 1 | Status: SHIPPED | OUTPATIENT
Start: 2024-10-11

## 2024-10-11 NOTE — TELEPHONE ENCOUNTER
Last appointment: 05/22/2024    Next appointment: 10/31/2024    Patient requesting refill for     allopurinol (ZYLOPRIM) 100 MG tablet [[id number]]      Pharmacy   [id number] - [place], [place] - [address] - P [phone] - F [phone] [address] [address]  Phone: [phone]  Fax: [phone]

## 2024-10-17 ENCOUNTER — CARE COORDINATION (OUTPATIENT)
Facility: CLINIC | Age: 84
End: 2024-10-17

## 2024-10-17 NOTE — CARE COORDINATION
Care Transitions Note    Follow Up Call     Attempted to reach patient for transitions of care follow up.  Unable to reach patient.      Outreach Attempts:   HIPAA compliant voicemail left for patient.       Follow Up Appointment:   Future Appointments         Provider Specialty Dept Phone    10/24/2024 10:00 AM Balbir Cleary MD      10/31/2024 10:00 AM Ricky Juarez, APRN - NP Family Medicine [phone]    11/8/2024 11:30 AM Ender Hernandez MD              Plan for follow-up call in 6-10 days based on severity of symptoms and risk factors. Plan for next call: symptom management-assess for DVT red flags  follow-up appointment-confirm attendance of nephrology appointment  medication management-confirm  of remainder of 30 day supply of Olivia Patrick RN

## 2024-10-25 ENCOUNTER — CARE COORDINATION (OUTPATIENT)
Facility: CLINIC | Age: 84
End: 2024-10-25

## 2024-10-25 NOTE — CARE COORDINATION
Care Transitions Note    Follow Up Call     Attempted to reach patient for transitions of care follow up.  Unable to reach patient.      Outreach Attempts:   HIPAA compliant voicemail left for patient.     Care Summary Note: Patient attended nephrology appointment on 10/24/24.    Follow Up Appointment:   Future Appointments         Provider Specialty Dept Phone    10/31/2024 10:00 AM Ricky Juarez APRN - NP Family Medicine [phone]    11/8/2024 11:30 AM Ender Hernandez MD              Plan for follow-up call in 6-10 days based on severity of symptoms and risk factors. Plan for next call: symptom management-assess for DVT red flags  follow-up appointment-confirm attendance of PCP appointment  medication management-confirm  of remainder of 30 day supply of Olivia Patrick RN

## 2024-10-31 ENCOUNTER — CARE COORDINATION (OUTPATIENT)
Facility: CLINIC | Age: 84
End: 2024-10-31

## 2024-10-31 ENCOUNTER — TELEPHONE (OUTPATIENT)
Facility: CLINIC | Age: 84
End: 2024-10-31

## 2024-10-31 DIAGNOSIS — I10 ESSENTIAL (PRIMARY) HYPERTENSION: Primary | ICD-10-CM

## 2024-10-31 DIAGNOSIS — M10.9 GOUT, UNSPECIFIED CAUSE, UNSPECIFIED CHRONICITY, UNSPECIFIED SITE: ICD-10-CM

## 2024-10-31 DIAGNOSIS — E78.2 MIXED HYPERLIPIDEMIA: ICD-10-CM

## 2024-10-31 NOTE — TELEPHONE ENCOUNTER
----- Message from Aayush JIM sent at 10/31/2024  8:55 AM EDT -----  Regarding: ECC Referral Request  ECC Referral Request    Reason for referral request: Lab/Test Order    Specialist/Lab/Test patient is requesting (if known): Lab work    Specialist Phone Number (if applicable):    Additional Information Patient's girlfriend asking if the patient need to book an appointment for his lab work.  --------------------------------------------------------------------------------------------------------------------------    Relationship to Patient: Spouse/Partner Yeimi     Call Back Information: OK to leave message on voicemail  Preferred Call Back Number: Phone [phone]

## 2024-10-31 NOTE — TELEPHONE ENCOUNTER
Called patient and informed per MAC Juarez, he does not need appointment with her today for follow up after nephrology unless he has a concern he would like to discuss. Patient states he does not have any concerns at this time. Informed patient we will cancel appointment for today and he will be due to follow up with MAC Juarez 03/2025. Patient voiced understanding.

## 2024-10-31 NOTE — CARE COORDINATION
Care Transitions Note    Final Call     Attempted to reach patient for transitions of care follow up.  Unable to reach patient.      Outreach Attempts:   HIPAA compliant voicemail left for patient. Encouraged patient to notify CTN if there's any concerns regarding cost of Eliquis.    Patient closed (disengaged) from the Care Transitions program on 10/31/24.      Handoff:   Patient was not referred to the ACM team due to unable to contact patient.      Care Summary Note: Patient attended nephrology appointment on 10/24/24.       Upcoming Appointments:    Future Appointments         Provider Specialty Dept Phone    11/8/2024 11:30 AM Ender Hernandez MD      3/10/2025 9:00 AM Ricky Juarez, APRN - NP Family Medicine [phone]            Laura Patrick RN

## 2025-03-25 ENCOUNTER — OFFICE VISIT (OUTPATIENT)
Facility: CLINIC | Age: 85
End: 2025-03-25

## 2025-03-25 VITALS
DIASTOLIC BLOOD PRESSURE: 86 MMHG | WEIGHT: 177 LBS | OXYGEN SATURATION: 97 % | HEIGHT: 70 IN | HEART RATE: 72 BPM | BODY MASS INDEX: 25.34 KG/M2 | SYSTOLIC BLOOD PRESSURE: 146 MMHG | RESPIRATION RATE: 16 BRPM

## 2025-03-25 DIAGNOSIS — M10.9 GOUT, UNSPECIFIED CAUSE, UNSPECIFIED CHRONICITY, UNSPECIFIED SITE: ICD-10-CM

## 2025-03-25 DIAGNOSIS — I48.0 PAROXYSMAL ATRIAL FIBRILLATION (HCC): ICD-10-CM

## 2025-03-25 DIAGNOSIS — C67.9 MALIGNANT NEOPLASM OF URINARY BLADDER, UNSPECIFIED SITE (HCC): ICD-10-CM

## 2025-03-25 DIAGNOSIS — E78.2 MIXED HYPERLIPIDEMIA: ICD-10-CM

## 2025-03-25 DIAGNOSIS — I10 ESSENTIAL (PRIMARY) HYPERTENSION: ICD-10-CM

## 2025-03-25 DIAGNOSIS — N18.32 CHRONIC KIDNEY DISEASE, STAGE 3B (HCC): ICD-10-CM

## 2025-03-25 DIAGNOSIS — Z00.00 MEDICARE ANNUAL WELLNESS VISIT, SUBSEQUENT: Primary | ICD-10-CM

## 2025-03-25 DIAGNOSIS — I50.43 ACUTE ON CHRONIC COMBINED SYSTOLIC AND DIASTOLIC HEART FAILURE (HCC): ICD-10-CM

## 2025-03-25 RX ORDER — ALLOPURINOL 100 MG/1
100 TABLET ORAL DAILY
Qty: 90 TABLET | Refills: 1 | Status: SHIPPED | OUTPATIENT
Start: 2025-03-25

## 2025-03-25 SDOH — ECONOMIC STABILITY: INCOME INSECURITY: IN THE LAST 12 MONTHS, WAS THERE A TIME WHEN YOU WERE NOT ABLE TO PAY THE MORTGAGE OR RENT ON TIME?: NO

## 2025-03-25 SDOH — HEALTH STABILITY: PHYSICAL HEALTH: ON AVERAGE, HOW MANY DAYS PER WEEK DO YOU ENGAGE IN MODERATE TO STRENUOUS EXERCISE (LIKE A BRISK WALK)?: 0 DAYS

## 2025-03-25 SDOH — ECONOMIC STABILITY: TRANSPORTATION INSECURITY
IN THE PAST 12 MONTHS, HAS LACK OF TRANSPORTATION KEPT YOU FROM MEETINGS, WORK, OR FROM GETTING THINGS NEEDED FOR DAILY LIVING?: NO

## 2025-03-25 SDOH — ECONOMIC STABILITY: FOOD INSECURITY: WITHIN THE PAST 12 MONTHS, THE FOOD YOU BOUGHT JUST DIDN'T LAST AND YOU DIDN'T HAVE MONEY TO GET MORE.: NEVER TRUE

## 2025-03-25 SDOH — ECONOMIC STABILITY: FOOD INSECURITY: WITHIN THE PAST 12 MONTHS, YOU WORRIED THAT YOUR FOOD WOULD RUN OUT BEFORE YOU GOT MONEY TO BUY MORE.: NEVER TRUE

## 2025-03-25 SDOH — ECONOMIC STABILITY: TRANSPORTATION INSECURITY
IN THE PAST 12 MONTHS, HAS THE LACK OF TRANSPORTATION KEPT YOU FROM MEDICAL APPOINTMENTS OR FROM GETTING MEDICATIONS?: NO

## 2025-03-25 ASSESSMENT — LIFESTYLE VARIABLES
HOW OFTEN DO YOU HAVE A DRINK CONTAINING ALCOHOL: NEVER
HOW OFTEN DO YOU HAVE SIX OR MORE DRINKS ON ONE OCCASION: 1
HOW MANY STANDARD DRINKS CONTAINING ALCOHOL DO YOU HAVE ON A TYPICAL DAY: 0
HOW MANY STANDARD DRINKS CONTAINING ALCOHOL DO YOU HAVE ON A TYPICAL DAY: PATIENT DOES NOT DRINK
HOW OFTEN DO YOU HAVE A DRINK CONTAINING ALCOHOL: 1

## 2025-03-25 ASSESSMENT — PATIENT HEALTH QUESTIONNAIRE - PHQ9
SUM OF ALL RESPONSES TO PHQ QUESTIONS 1-9: 0
2. FEELING DOWN, DEPRESSED OR HOPELESS: NOT AT ALL
1. LITTLE INTEREST OR PLEASURE IN DOING THINGS: NOT AT ALL
SUM OF ALL RESPONSES TO PHQ QUESTIONS 1-9: 0

## 2025-03-25 NOTE — PATIENT INSTRUCTIONS
Learning About Being Active as an Older Adult  Why is being active important as you get older?     Being active is one of the best things you can do for your health. And it's never too late to start. Being active--or getting active, if you aren't already--has definite benefits. It can:  Give you more energy,  Keep your mind sharp.  Improve balance to reduce your risk of falls.  Help you manage chronic illness with fewer medicines.  No matter how old you are, how fit you are, or what health problems you have, there is a form of activity that will work for you. And the more physical activity you can do, the better your overall health will be.  What kinds of activity can help you stay healthy?  Being more active will make your daily activities easier. Physical activity includes planned exercise and things you do in daily life. There are four types of activity:  Aerobic.  Doing aerobic activity makes your heart and lungs strong.  Includes walking, dancing, and gardening.  Aim for at least 2½ hours spread throughout the week.  It improves your energy and can help you sleep better.  Muscle-strengthening.  This type of activity can help maintain muscle and strengthen bones.  Includes climbing stairs, using resistance bands, and lifting or carrying heavy loads.  Aim for at least twice a week.  It can help protect the knees and other joints.  Stretching.  Stretching gives you better range of motion in joints and muscles.  Includes upper arm stretches, calf stretches, and gentle yoga.  Aim for at least twice a week, preferably after your muscles are warmed up from other activities.  It can help you function better in daily life.  Balancing.  This helps you stay coordinated and have good posture.  Includes heel-to-toe walking, xochitl chi, and certain types of yoga.  Aim for at least 3 days a week.  It can reduce your risk of falling.  Even if you have a hard time meeting the recommendations, it's better to be more active

## 2025-03-25 NOTE — PROGRESS NOTES
Medicare Annual Wellness Visit    Negro Mederos Sr. is here for Medicare AWV    Assessment & Plan   Medicare annual wellness visit, subsequent  Gout, unspecified cause, unspecified chronicity, unspecified site  -     allopurinol (ZYLOPRIM) 100 MG tablet; Take 1 tablet by mouth daily, Disp-90 tablet, R-1Normal  -     Uric Acid; Future  Mixed hyperlipidemia  -     Lipid Panel; Future  Essential (primary) hypertension  Chronic kidney disease, stage 3b (HCC)  Acute on chronic combined systolic and diastolic heart failure (HCC)  Malignant neoplasm of urinary bladder, unspecified site (HCC)  Assessment & Plan:   Monitored by specialist- no acute findings meriting change in the plan  Paroxysmal atrial fibrillation (HCC)  Assessment & Plan:   Monitored by specialist- no acute findings meriting change in the plan       Return in about 1 year (around 3/25/2026) for AWV, HTN, HLD, In Office (ONLY), Fasting Labs 1 Wk Prior.     Subjective   The following acute and/or chronic problems were also addressed today:  See notes    Patient's complete Health Risk Assessment and screening values have been reviewed and are found in Flowsheets. The following problems were reviewed today and where indicated follow up appointments were made and/or referrals ordered.    Positive Risk Factor Screenings with Interventions:              Inactivity:  On average, how many days per week do you engage in moderate to strenuous exercise (like a brisk walk)?: (Patient-Rptd) 0 days (!) Abnormal     Interventions:  See AVS for additional education material      Dentist Screen:  Have you seen the dentist within the past year?: (!) (Patient-Rptd) No    Intervention:  Advised to schedule with their dentist     Vision Screen:  Do you have difficulty driving, watching TV, or doing any of your daily activities because of your eyesight?: (Patient-Rptd) No  Have you had an eye exam within the past year?: (!) (Patient-Rptd) No  Interventions:   Patient

## 2025-03-25 NOTE — PROGRESS NOTES
\"Have you been to the ER, urgent care clinic since your last visit?  Hospitalized since your last visit?\"    YES - When: approximately 6 months ago.  Where and Why: [facility] for DVT.    “Have you seen or consulted any other health care providers outside our system since your last visit?”    NO

## 2025-03-25 NOTE — PROGRESS NOTES
Hypertension    Assessment/Plan:     hypertension stable, needs further observation, and needs to follow diet more regularly, hyperlipidemia stable, needs further observation, and needs to follow diet more regularly.    Lab results and schedule of future lab studies reviewed with patient.  Repeat labs ordered prior to next appointment.  Orders and follow up as documented in patient record.  Reviewed diet, exercise and weight control.  Recommended sodium restriction.  Copy of written low fat low cholesterol diet provided and reviewed.  Cardiovascular risk and specific lipid/LDL goals reviewed.  Reviewed medications and side effects in detail.  Reviewed potential future medication changes and side effects..     1. Medicare annual wellness visit, subsequent  2. Acute on chronic combined systolic and diastolic heart failure (HCC)  3. Essential (primary) hypertension  4. Paroxysmal atrial fibrillation (HCC)  Assessment & Plan:   Monitored by specialist- no acute findings meriting change in the plan  5. Chronic kidney disease, stage 3b (HCC)  6. Mixed hyperlipidemia  -     Lipid Panel; Future  7. Gout, unspecified cause, unspecified chronicity, unspecified site  -     allopurinol (ZYLOPRIM) 100 MG tablet; Take 1 tablet by mouth daily, Disp-90 tablet, R-1Normal  -     Uric Acid; Future  8. Malignant neoplasm of urinary bladder, unspecified site (HCC)  Assessment & Plan:   Monitored by specialist- no acute findings meriting change in the plan     Return in about 1 year (around 3/25/2026) for AWV, HTN, HLD, In Office (ONLY), Fasting Labs 1 Wk Prior.   Subjective:     Negro Mederos Sr. is a 85 y.o. White (non-) male Established patient, who presents for follow-up of hypertension. Pt denies any SOB, edema, CP, orthopnea, palpitations, nocturnal dyspnea, headaches, or blurred vision.     Diet and Lifestyle: generally follows a low fat low cholesterol diet, generally follows a low sodium diet, and sedentary  Home BP

## 2025-03-31 ENCOUNTER — HOSPITAL ENCOUNTER (OUTPATIENT)
Facility: HOSPITAL | Age: 85
Setting detail: SPECIMEN
Discharge: HOME | End: 2025-04-03
Payer: MEDICARE

## 2025-03-31 DIAGNOSIS — M10.9 GOUT, UNSPECIFIED CAUSE, UNSPECIFIED CHRONICITY, UNSPECIFIED SITE: ICD-10-CM

## 2025-03-31 DIAGNOSIS — E78.2 MIXED HYPERLIPIDEMIA: ICD-10-CM

## 2025-03-31 LAB
CHOLEST SERPL-MCNC: 108 MG/DL
HDLC SERPL-MCNC: 54 MG/DL (ref 40–60)
HDLC SERPL: 2 (ref 0–5)
LDLC SERPL CALC-MCNC: 43.2 MG/DL (ref 0–100)
LIPID PANEL: NORMAL
TRIGL SERPL-MCNC: 54 MG/DL
URATE SERPL-MCNC: 5.7 MG/DL (ref 2.6–7.2)
VLDLC SERPL CALC-MCNC: 10.8 MG/DL

## 2025-03-31 PROCEDURE — 80061 LIPID PANEL: CPT

## 2025-03-31 PROCEDURE — 36415 COLL VENOUS BLD VENIPUNCTURE: CPT

## 2025-03-31 PROCEDURE — 84550 ASSAY OF BLOOD/URIC ACID: CPT

## 2025-04-03 ENCOUNTER — RESULTS FOLLOW-UP (OUTPATIENT)
Facility: CLINIC | Age: 85
End: 2025-04-03

## (undated) DEVICE — TUBING, SUCTION, 9/32" X 10', STRAIGHT: Brand: MEDLINE

## (undated) DEVICE — INTENDED FOR TISSUE SEPARATION, AND OTHER PROCEDURES THAT REQUIRE A SHARP SURGICAL BLADE TO PUNCTURE OR CUT.: Brand: BARD-PARKER SAFETY BLADES SIZE 15, STERILE

## (undated) DEVICE — GLOVE SURG SZ 75 L12IN FNGR THK79MIL GRN LTX FREE

## (undated) DEVICE — SPONGE: SPECIALTY PEANUT XR 100/CS: Brand: MEDICAL ACTION INDUSTRIES

## (undated) DEVICE — SUTURE MCRYL SZ 2-0 L27IN ABSRB UD SH L26MM TAPERPOINT NDL Y417H

## (undated) DEVICE — NDL HYPO BVL NSAF 25GX1IN LF --

## (undated) DEVICE — CHERRY SPONGES: Brand: DEROYAL

## (undated) DEVICE — (D)SLEEVE RMFG SCD CALF SCF RG -- DISC BY MFR

## (undated) DEVICE — GLOVE ORANGE PI 7   MSG9070

## (undated) DEVICE — INTENDED FOR TISSUE SEPARATION, AND OTHER PROCEDURES THAT REQUIRE A SHARP SURGICAL BLADE TO PUNCTURE OR CUT.: Brand: BARD-PARKER SAFETY BLADES SIZE 10, STERILE

## (undated) DEVICE — MARKER SKN REG TIP W/RULER -- STRL

## (undated) DEVICE — SYRINGE BLB 60 CC TIP PROTECTOR STRL LF

## (undated) DEVICE — GLOVE ORANGE PI 7 1/2   MSG9075

## (undated) DEVICE — INSULATED BLADE ELECTRODE: Brand: EDGE

## (undated) DEVICE — SUTURE MCRYL SZ 4-0 L18IN ABSRB UD L19MM PS-2 3/8 CIR PRIM Y496G

## (undated) DEVICE — STAPLER SKIN H3.9MM WIRE DIA0.58MM CRWN 6.9MM 35 STPL ROT

## (undated) DEVICE — SUTURE MCRYL SZ 3-0 L27IN ABSRB UD L26MM SH 1/2 CIR Y416H

## (undated) DEVICE — DRAIN WND PENRS RADPQ 0.25X12 --

## (undated) DEVICE — GAUZE,SPONGE,4"X4",16PLY,STRL,LF,10/TRAY: Brand: MEDLINE

## (undated) DEVICE — CLEANER,CAUTERY TIP,2X2",STERILE: Brand: MEDLINE

## (undated) DEVICE — SPONGE LAP SOFT 18X18 IN X RAY DETECTABLE

## (undated) DEVICE — GOWN,AURORA,FABRIC-REINFORCED,X-LARGE: Brand: MEDLINE

## (undated) DEVICE — TOWEL,OR,DSP,ST,BLUE,STD,4/PK,20PK/CS: Brand: MEDLINE

## (undated) DEVICE — SYR 20ML LL STRL LF --

## (undated) DEVICE — REM POLYHESIVE ADULT PATIENT RETURN ELECTRODE: Brand: VALLEYLAB

## (undated) DEVICE — 3M™ TEGADERM™ HP TRANSPARENT FILM DRESSING FRAME STYLE, 9546HP, 4 IN X 4-1/2 IN (10 CM X 11.5 CM), 50/CT 4CT/CASE: Brand: 3M™ TEGADERM™

## (undated) DEVICE — THE STERILE LIGHT HANDLE COVER IS USED WITH STERIS SURGICAL LIGHTING AND VISUALIZATION SYSTEMS.

## (undated) DEVICE — YANKAUER,BULB TIP,W/O VENT,RIGID,STERILE: Brand: MEDLINE

## (undated) DEVICE — GOWN SURG RNFRCD TOWEL XL STRL

## (undated) DEVICE — APPLIER CLP M L L11.4IN DIA10MM ENDOSCP ROT MULT FOR LIG

## (undated) DEVICE — SPONGE GZ 4X4 IN 16-PLY DETECTABLE W/ DMT MSTR TAG

## (undated) DEVICE — STERILE POLYISOPRENE POWDER-FREE SURGICAL GLOVES: Brand: PROTEXIS

## (undated) DEVICE — COUNTER NDL 40 COUNT HLD 70 FOAM BLK ADH W/ MAG

## (undated) DEVICE — PENCIL SMK EVAC L10FT TBNG NONSTICK ESU BLDE PLUMEPEN ELITE

## (undated) DEVICE — SUTURE PROL SZ 0 L30IN NONABSORBABLE BLU L26MM CT-2 1/2 CIR 8412H

## (undated) DEVICE — PACK,UNIVERSAL,SPLIT,II,AURORA: Brand: MEDLINE